# Patient Record
Sex: MALE | Race: OTHER | HISPANIC OR LATINO | Employment: UNEMPLOYED | URBAN - METROPOLITAN AREA
[De-identification: names, ages, dates, MRNs, and addresses within clinical notes are randomized per-mention and may not be internally consistent; named-entity substitution may affect disease eponyms.]

---

## 2017-09-10 ENCOUNTER — HOSPITAL ENCOUNTER (EMERGENCY)
Facility: HOSPITAL | Age: 47
Discharge: HOME | End: 2017-09-10
Attending: EMERGENCY MEDICINE | Admitting: EMERGENCY MEDICINE
Payer: COMMERCIAL

## 2017-09-10 VITALS
BODY MASS INDEX: 24.17 KG/M2 | SYSTOLIC BLOOD PRESSURE: 131 MMHG | OXYGEN SATURATION: 99 % | HEIGHT: 67 IN | RESPIRATION RATE: 18 BRPM | TEMPERATURE: 97.8 F | HEART RATE: 75 BPM | WEIGHT: 154 LBS | DIASTOLIC BLOOD PRESSURE: 78 MMHG

## 2017-09-10 DIAGNOSIS — F41.9 ANXIETY: Primary | ICD-10-CM

## 2017-09-10 DIAGNOSIS — F19.239 DRUG WITHDRAWAL (HCC): ICD-10-CM

## 2017-09-10 PROCEDURE — 99284 EMERGENCY DEPT VISIT MOD MDM: CPT

## 2017-09-10 RX ORDER — LORAZEPAM 0.5 MG/1
1 TABLET ORAL ONCE
Status: COMPLETED | OUTPATIENT
Start: 2017-09-10 | End: 2017-09-10

## 2017-09-10 RX ORDER — NALTREXONE HYDROCHLORIDE 50 MG/1
50 TABLET, FILM COATED ORAL DAILY
Status: COMPLETED | OUTPATIENT
Start: 2017-09-10 | End: 2017-09-10

## 2017-09-10 RX ORDER — NALTREXONE HYDROCHLORIDE 50 MG/1
50 TABLET, FILM COATED ORAL DAILY
Qty: 14 TABLET | Refills: 0 | Status: SHIPPED | OUTPATIENT
Start: 2017-09-10 | End: 2017-09-24

## 2017-09-10 RX ORDER — LORAZEPAM 0.5 MG/1
0.5 TABLET ORAL ONCE
Status: DISCONTINUED | OUTPATIENT
Start: 2017-09-10 | End: 2017-09-10

## 2017-09-10 RX ADMIN — LORAZEPAM 1 MG: 0.5 TABLET ORAL at 21:38

## 2017-09-10 RX ADMIN — NALTREXONE HYDROCHLORIDE 50 MG: 50 TABLET, FILM COATED ORAL at 22:00

## 2017-09-11 ENCOUNTER — HOSPITAL ENCOUNTER (EMERGENCY)
Facility: HOSPITAL | Age: 47
End: 2017-09-12
Attending: EMERGENCY MEDICINE | Admitting: EMERGENCY MEDICINE
Payer: COMMERCIAL

## 2017-09-11 VITALS
DIASTOLIC BLOOD PRESSURE: 66 MMHG | RESPIRATION RATE: 18 BRPM | TEMPERATURE: 97.1 F | SYSTOLIC BLOOD PRESSURE: 137 MMHG | HEART RATE: 74 BPM | WEIGHT: 154 LBS | OXYGEN SATURATION: 100 % | BODY MASS INDEX: 24.12 KG/M2

## 2017-09-11 DIAGNOSIS — R45.850 HOMICIDAL IDEATION: Primary | ICD-10-CM

## 2017-09-11 DIAGNOSIS — R45.851 SUICIDAL IDEATION: ICD-10-CM

## 2017-09-11 LAB
AMPHETAMINES SERPL QL SCN: NEGATIVE
BARBITURATES UR QL: NEGATIVE
BENZODIAZ UR QL: NEGATIVE
COCAINE UR QL: NEGATIVE
METHADONE UR QL: NEGATIVE
OPIATES UR QL SCN: NEGATIVE
PCP UR QL: NEGATIVE
THC UR QL: NEGATIVE

## 2017-09-11 PROCEDURE — 82075 ASSAY OF BREATH ETHANOL: CPT | Performed by: EMERGENCY MEDICINE

## 2017-09-11 PROCEDURE — 80307 DRUG TEST PRSMV CHEM ANLYZR: CPT | Performed by: EMERGENCY MEDICINE

## 2017-09-12 PROCEDURE — 99285 EMERGENCY DEPT VISIT HI MDM: CPT

## 2018-08-11 ENCOUNTER — HOSPITAL ENCOUNTER (EMERGENCY)
Facility: HOSPITAL | Age: 48
End: 2018-08-11
Attending: EMERGENCY MEDICINE | Admitting: EMERGENCY MEDICINE
Payer: COMMERCIAL

## 2018-08-11 VITALS
HEART RATE: 85 BPM | OXYGEN SATURATION: 100 % | BODY MASS INDEX: 23.18 KG/M2 | TEMPERATURE: 97.1 F | SYSTOLIC BLOOD PRESSURE: 164 MMHG | RESPIRATION RATE: 16 BRPM | WEIGHT: 148 LBS | DIASTOLIC BLOOD PRESSURE: 101 MMHG

## 2018-08-11 DIAGNOSIS — R45.851 SUICIDAL IDEATION: Primary | ICD-10-CM

## 2018-08-11 LAB
AMPHETAMINES SERPL QL SCN: NEGATIVE
BARBITURATES UR QL: NEGATIVE
BENZODIAZ UR QL: NEGATIVE
COCAINE UR QL: POSITIVE
ETHANOL EXG-MCNC: 0 MG/DL
METHADONE UR QL: NEGATIVE
OPIATES UR QL SCN: POSITIVE
PCP UR QL: NEGATIVE
THC UR QL: POSITIVE

## 2018-08-11 PROCEDURE — 99285 EMERGENCY DEPT VISIT HI MDM: CPT

## 2018-08-11 PROCEDURE — 80307 DRUG TEST PRSMV CHEM ANLYZR: CPT | Performed by: EMERGENCY MEDICINE

## 2018-08-11 PROCEDURE — 82075 ASSAY OF BREATH ETHANOL: CPT | Performed by: EMERGENCY MEDICINE

## 2018-08-11 RX ORDER — NICOTINE 21 MG/24HR
14 PATCH, TRANSDERMAL 24 HOURS TRANSDERMAL ONCE
Status: DISCONTINUED | OUTPATIENT
Start: 2018-08-11 | End: 2018-08-11 | Stop reason: HOSPADM

## 2018-08-11 RX ORDER — IBUPROFEN 400 MG/1
400 TABLET ORAL ONCE
Status: COMPLETED | OUTPATIENT
Start: 2018-08-11 | End: 2018-08-11

## 2018-08-11 RX ADMIN — NICOTINE 14 MG: 14 PATCH, EXTENDED RELEASE TRANSDERMAL at 04:35

## 2018-08-11 RX ADMIN — IBUPROFEN 400 MG: 400 TABLET ORAL at 04:35

## 2018-08-11 NOTE — ED NOTES
Insurance Authorization:   Phone call placed to [facility]  Phone number: [phone]  Spoke to Prince Hardy  3 days approved    Level of care: Inpatient Mental Health  Review on 8/13/18  Authorization # call upon arrival

## 2018-08-11 NOTE — EMTALA/ACUTE CARE TRANSFER
[address]   [place] [address]  Dept: [facility]ie P[facility] Perla Vásquez   1970                              MRN [id number]    I have been informed of my rights regarding examination, treatment, and transfer   by Dr Tino Byers MD    Benefits: Other benefits (Include comment)_______________________ (stabilization of mental health)    Risks: Other: (Include comment)__________________________ (decompensation)      Transfer Request   I acknowledge that my medical condition has been evaluated and explained to me by the emergency department physician or other qualified medical person and/or my attending physician who has recommended and offered to me further medical examination and treatment  I understand the Hospital's obligation with respect to the treatment and stabilization of my emergency medical condition  I nevertheless request to be transferred  I release the Hospital, the doctor, and any other persons caring for me from all responsibility or liability for any injury or ill effects that may result from my transfer and agree to accept all responsibility for the consequences of my choice to transfer, rather than receive stabilizing treatment at the Hospital  I understand that because the transfer is my request, my insurance may not provide reimbursement for the services  The Hospital will assist and direct me and my family in how to make arrangements for transfer, but the hospital is not liable for any fees charged by the transport service  In spite of this understanding, I refuse to consent to further medical examination and treatment which has been offered to me, and request transfer to  [facility] Name, [address] : [place]   I authorize the performance of emergency medical procedures and treatments upon me in both transit and upon arrival at the receiving facility  Additionally, I authorize the release of any and all medical records to the receiving facility and request they be transported with me, if possible  I authorize the performance of emergency medical procedures and treatments upon me in both transit and upon arrival at the receiving facility  Additionally, I authorize the release of any and all medical records to the receiving facility and request they be transported with me, if possible  I understand that the safest mode of transportation during a medical emergency is an ambulance and that the Hospital advocates the use of this mode of transport  Risks of traveling to the receiving facility by car, including absence of medical control, life sustaining equipment, such as oxygen, and medical personnel has been explained to me and I fully understand them  (ORIN CORRECT BOX BELOW)  [  ]  I consent to the stated transfer and to be transported by ambulance/helicopter  [  ]  I consent to the stated transfer, but refuse transportation by ambulance and accept full responsibility for my transportation by car  I understand the risks of non-ambulance transfers and I exonerate the Hospital and its staff from any deterioration in my condition that results from this refusal     X___________________________________________    DATE  18  TIME________  Signature of patient or legally responsible individual signing on patient behalf           RELATIONSHIP TO PATIENT_________________________          Provider Rasheed Garcia   1970                              MRN [id number]    A medical screening exam was performed on the above named patient  Based on the examination:    Condition Necessitating Transfer The encounter diagnosis was Suicidal ideation      Patient Condition: The patient has been stabilized such that within reasonable medical probability, no material deterioration of the patient condition or the condition of the unborn child(debbie) is likely to result from the transfer    Reason for Transfer: Level of Care needed not available at this facility    Transfer Requirements: [address] [place] [address]   · Space available and qualified personnel available for treatment as acknowledged by Viktoriya Membreno [phone]  · Agreed to accept transfer and to provide appropriate medical treatment as acknowledged by       Dr Tyler Antony  · Appropriate medical records of the examination and treatment of the patient are provided at the time of transfer   [address] [facility], [address] _______  · Transfer will be performed by qualified personnel from [facility]  and appropriate transfer equipment as required, including the use of necessary and appropriate life support measures  Provider Certification: I have examined the patient and explained the following risks and benefits of being transferred/refusing transfer to the patient/family:  General risk, such as traffic hazards, adverse weather conditions, rough terrain or turbulence, possible failure of equipment (including vehicle or aircraft), or consequences of actions of persons outside the control of the transport personnel      Based on these reasonable risks and benefits to the patient and/or the unborn child(debbie), and based upon the information available at the time of the patients examination, I certify that the medical benefits reasonably to be expected from the provision of appropriate medical treatments at another medical facility outweigh the increasing risks, if any, to the individuals medical condition, and in the case of labor to the unborn child, from effecting the transfer      X____________________________________________ DATE 08/11/18        TIME_______      ORIGINAL - SEND TO MEDICAL RECORDS   COPY - SEND WITH PATIENT DURING TRANSFER

## 2018-08-11 NOTE — ED NOTES
Crisis Worker (CW) called and spoke to Kane at [facility] and she stated to fax over the clinical       CW faxed clinical to [facility]

## 2018-08-11 NOTE — ED NOTES
CW received phone call from [facility] who asked additional questions regarding patient  She stated that patient is accepted by Dr Susan Goldsmith  CW set up transport with [facility] for 1900  CW called and let Bertha at [facility] know of ETA  CW had Pt ad Dr Shanelle Lopez EMTALA

## 2018-08-11 NOTE — ED NOTES
Call placed to [facility]  No clinical manager was available  Waiting return call

## 2018-08-11 NOTE — LETTER
[address]   [place] [address]  Dept: [facility] Tena Anderson   1970                              MRN [id number]    I have been informed of my rights regarding examination, treatment, and transfer   by Dr Andreia Frederick MD    Benefits: Other benefits (Include comment)_______________________ (stabilization of mental health)    Risks: Other: (Include comment)__________________________ (decompensation)      Transfer Request   I acknowledge that my medical condition has been evaluated and explained to me by the emergency department physician or other qualified medical person and/or my attending physician who has recommended and offered to me further medical examination and treatment  I understand the Hospital's obligation with respect to the treatment and stabilization of my emergency medical condition  I nevertheless request to be transferred  I release the Hospital, the doctor, and any other persons caring for me from all responsibility or liability for any injury or ill effects that may result from my transfer and agree to accept all responsibility for the consequences of my choice to transfer, rather than receive stabilizing treatment at the Hospital  I understand that because the transfer is my request, my insurance may not provide reimbursement for the services  The Hospital will assist and direct me and my family in how to make arrangements for transfer, but the hospital is not liable for any fees charged by the transport service  In spite of this understanding, I refuse to consent to further medical examination and treatment which has been offered to me, and request transfer to  [facility] Name, [address] : [place], [place]   I authorize the performance of emergency medical procedures and treatments upon me in both transit and upon arrival at the receiving facility  Additionally, I authorize the release of any and all medical records to the receiving facility and request they be transported with me, if possible  I authorize the performance of emergency medical procedures and treatments upon me in both transit and upon arrival at the receiving facility  Additionally, I authorize the release of any and all medical records to the receiving facility and request they be transported with me, if possible  I understand that the safest mode of transportation during a medical emergency is an ambulance and that the Hospital advocates the use of this mode of transport  Risks of traveling to the receiving facility by car, including absence of medical control, life sustaining equipment, such as oxygen, and medical personnel has been explained to me and I fully understand them  (ORIN CORRECT BOX BELOW)  [ X ]  I consent to the stated transfer and to be transported by ambulance/helicopter  [  ]  I consent to the stated transfer, but refuse transportation by ambulance and accept full responsibility for my transportation by car  I understand the risks of non-ambulance transfers and I exonerate the Hospital and its staff from any deterioration in my condition that results from this refusal     X___________________________________________    DATE  18  TIME________  Signature of patient or legally responsible individual signing on patient behalf           RELATIONSHIP TO PATIENT________self_________________          Provider [facility] 1970                              MRN [id number]    A medical screening exam was performed on the above named patient  Based on the examination:    Condition Necessitating Transfer There were no encounter diagnoses      Patient Condition: The patient has been stabilized such that within reasonable medical probability, no material deterioration of the patient condition or the condition of the unborn child(debbie) is likely to result from the transfer    Reason for Transfer: Level of Care needed not available at this facility    Transfer Requirements: [address], [place], [place]   · Space available and qualified personnel available for treatment as acknowledged by Jeaneth Sykes [phone]  · Agreed to accept transfer and to provide appropriate medical treatment as acknowledged by       Dr Daisy Rios  · Appropriate medical records of the examination and treatment of the patient are provided at the time of transfer   [address] [facility], [address] _______  · [facility]  and appropriate transfer equipment as required, including the use of necessary and appropriate life support measures  Provider Certification: I have examined the patient and explained the following risks and benefits of being transferred/refusing transfer to the patient/family:  General risk, such as traffic hazards, adverse weather conditions, rough terrain or turbulence, possible failure of equipment (including vehicle or aircraft), or consequences of actions of persons outside the control of the transport personnel      Based on these reasonable risks and benefits to the patient and/or the unborn child(debbie), and based upon the information available at the time of the patients examination, I certify that the medical benefits reasonably to be expected from the provision of appropriate medical treatments at another medical facility outweigh the increasing risks, if any, to the individuals medical condition, and in the case of labor to the unborn child, from effecting the transfer      X____________________________________________ DATE 08/11/18        TIME_______      ORIGINAL - SEND TO MEDICAL RECORDS   COPY - SEND WITH PATIENT DURING TRANSFER

## 2018-08-11 NOTE — ED PROVIDER NOTES
History  Chief Complaint   Patient presents with    Psychiatric Evaluation     pt reports SI today related to recent breakup with fiance  denies HI, VH, AH  plans to overdose on heroin     40 yo M presents to ED for evaluation of suicidal ideation and s/p suicide attempt (pt states he used 8 bags of heroin and 3 bags of cocaine)  Pt states he has been depressed recently with family stressors decided to overdose in an attempt to kill himself around midnight tonight  Pt has a hx of depression, anxiety, substance abuse and bipolar disorder, no significant PSH  Pt denies any HI, AVH  Pt states chronic low back pain, no hx of recent trauma/injury, verbalizes no additional medical complaints  Pt is a current 1/2 ppd smoker, denies any ETOH use or other recreational drug use  No other complaints at this time  Prior to Admission Medications   Prescriptions Last Dose Informant Patient Reported? Taking? FLUoxetine (PROzac) 20 mg capsule   No No   Sig: Take 1 capsule (20 mg total) by mouth daily Indications: Manic-Depression  Naltrexone (VIVITROL IM)   Yes No   Sig: Inject 1 Dose into the shoulder, thigh, or buttocks every 30 (thirty) days Due 9/11   QUEtiapine (SEROquel) 200 mg tablet   No No   Sig: Take 1 tablet (200 mg total) by mouth daily at bedtime Indications: Manic Phase of Manic-Depression     Patient taking differently: Take 300 mg by mouth 2 (two) times a day     naltrexone (REVIA) 50 mg tablet   No No   Sig: Take 1 tablet by mouth daily for 14 days      Facility-Administered Medications: None       Past Medical History:   Diagnosis Date    Anxiety     Bipolar I disorder, most recent episode depressed, severe with psychotic features ([facility] ) 2/23/2016    Depression     Foot infection     Hepatitis C virus infection 02/23/2016    has had treatment    Psychiatric illness     Self-injurious behavior     Substance abuse     Suicide attempt ([facility] )     Withdrawal symptoms, drug or narcotic ([facility] ) History reviewed  No pertinent surgical history  Family History   Problem Relation Age of Onset    No Known Problems Mother     No Known Problems Father     No Known Problems Sister     No Known Problems Brother     No Known Problems Maternal Aunt     No Known Problems Paternal Aunt     No Known Problems Maternal Uncle     No Known Problems Paternal Uncle     No Known Problems Maternal Grandfather     No Known Problems Maternal Grandmother     No Known Problems Paternal Grandfather     No Known Problems Paternal Grandmother     No Known Problems Cousin     ADD / ADHD Neg Hx     Alcohol abuse Neg Hx     Anxiety disorder Neg Hx     Bipolar disorder Neg Hx     Dementia Neg Hx     Depression Neg Hx     Drug abuse Neg Hx     OCD Neg Hx     Paranoid behavior Neg Hx     Schizophrenia Neg Hx     Seizures Neg Hx     Self-Injury Neg Hx     Suicide Attempts Neg Hx      I have reviewed and agree with the history as documented  Social History   Substance Use Topics    Smoking status: Current Every Day Smoker     Packs/day: 0 50     Years: 15 00    Smokeless tobacco: Never Used    Alcohol use No        Review of Systems   Constitutional: Negative for chills, fatigue and fever  HENT: Negative for congestion, rhinorrhea and sore throat  Eyes: Negative for pain, redness and visual disturbance  Respiratory: Negative for cough, chest tightness, shortness of breath, wheezing and stridor  Cardiovascular: Negative for chest pain, palpitations and leg swelling  Gastrointestinal: Negative for abdominal pain, blood in stool, constipation, diarrhea, nausea and vomiting  Genitourinary: Negative for dysuria, frequency, hematuria and urgency  Musculoskeletal: Positive for back pain (chronic low back pain)  Negative for neck pain  Skin: Negative for pallor and rash  Neurological: Negative for dizziness, seizures, syncope, weakness, light-headedness and headaches  Psychiatric/Behavioral: Positive for dysphoric mood, self-injury and suicidal ideas  Negative for agitation, confusion and hallucinations  The patient is not nervous/anxious  Physical Exam  ED Triage Vitals   Temperature Pulse Respirations Blood Pressure SpO2   08/11/18 0202 08/11/18 0202 08/11/18 0202 08/11/18 0202 08/11/18 0202   (!) 97 1 °F (36 2 °C) 101 16 142/94 98 %      Temp Source Heart Rate Source Patient Position - Orthostatic VS BP Location FiO2 (%)   08/11/18 0202 08/11/18 1249 08/11/18 1249 08/11/18 1249 --   Tympanic Monitor Sitting Right arm       Pain Score       08/11/18 0202       8           Orthostatic Vital Signs  Vitals:    08/11/18 0202 08/11/18 1249 08/11/18 1914   BP: 142/94 132/69 (!) 164/101   Pulse: 101 72 85   Patient Position - Orthostatic VS:  Sitting        Physical Exam   Constitutional: He is oriented to person, place, and time  He appears well-developed and well-nourished  No distress  HENT:   Head: Normocephalic and atraumatic  Nose: Nose normal    Mouth/Throat: Oropharynx is clear and moist  No oropharyngeal exudate  Eyes: Conjunctivae and EOM are normal  Pupils are equal, round, and reactive to light  Right eye exhibits no discharge  Left eye exhibits no discharge  Neck: Normal range of motion  Neck supple  No JVD present  No tracheal deviation present  Cardiovascular: Normal rate, regular rhythm, normal heart sounds and intact distal pulses  Exam reveals no gallop and no friction rub  No murmur heard  Pulmonary/Chest: Effort normal and breath sounds normal  No stridor  No respiratory distress  He has no wheezes  He has no rales  He exhibits no tenderness  Abdominal: Soft  Bowel sounds are normal  He exhibits no distension  There is no tenderness  There is no rebound and no guarding  Genitourinary:   Genitourinary Comments: No flank/CVA tenderness   Musculoskeletal: Normal range of motion   He exhibits tenderness (R SI joint tenderness, no midline spinal tenderness/no step-off)  He exhibits no edema or deformity  Neurological: He is alert and oriented to person, place, and time  No cranial nerve deficit  Skin: Skin is warm and dry  No rash noted  He is not diaphoretic  Psychiatric: He has a normal mood and affect  Nursing note and vitals reviewed  ED Medications  Medications   ibuprofen (MOTRIN) tablet 400 mg (400 mg Oral Given 8/11/18 0435)       Diagnostic Studies  Results Reviewed     Procedure Component Value Units Date/Time    Rapid drug screen, urine [[id number]]  (Abnormal) Collected:  08/11/18 0704    Lab Status:  Final result Specimen:  Urine from Urine, Other Updated:  08/11/18 0735     Amph/Meth UR Negative     Barbiturate Ur Negative     Benzodiazepine Urine Negative     Cocaine Urine Positive (A)     Methadone Urine Negative     Opiate Urine Positive (A)     PCP Ur Negative     THC Urine Positive (A)    Narrative:         Presumptive report  If requested, specimen will be sent to reference lab for confirmation  FOR MEDICAL PURPOSES ONLY  IF CONFIRMATION NEEDED PLEASE CONTACT THE LAB WITHIN 5 DAYS  Drug Screen Cutoff Levels:  AMPHETAMINE/METHAMPHETAMINES  1000 ng/mL  BARBITURATES     200 ng/mL  BENZODIAZEPINES     200 ng/mL  COCAINE      300 ng/mL  METHADONE      300 ng/mL  OPIATES      300 ng/mL  PHENCYCLIDINE     25 ng/mL  THC       50 ng/mL    POCT alcohol breath test [[id number]]  (Normal) Resulted:  08/11/18 0339    Lab Status:  Final result Updated:  08/11/18 0339     EXTBreath Alcohol 0 000                 No orders to display         Procedures  Procedures      Phone Consults  ED Phone Contact    ED Course  ED Course as of Aug 11 2124   Sat Aug 11, 2018   0502 Crisis evaluated patient, 12 signed, bed search in progress               Patient reevaluated with improvement in condition noted   Patient updated on results of tests and plan of care including transfer to [address] [place] [address] for further evaluation of presenting symptoms with understanding verbalized  Transportation services arranged and necessary EMTALA forms completed with risks and benefits of transfer understood  Report to Dr Rylan Day with Psychiatry for continuation of patient care  [facility]  CritCare Time    Disposition  Final diagnoses:   Suicidal ideation     Time reflects when diagnosis was documented in both MDM as applicable and the Disposition within this note     Time User Action Codes Description Comment    8/11/2018  4:53 PM Lianne Shaver [M53 3] SI (sacroiliac) joint dysfunction     8/11/2018  4:53 PM Sergio Shaver [M53 3] SI (sacroiliac) joint dysfunction     8/11/2018  4:53 PM Lianne Shaver Add [N41 624] Suicidal ideation       ED Disposition     ED Disposition Condition Comment    Transfer to Another [address] [place] [address]  should be transferred out to [address] [place] [address] under the care of Dr Rylan Day MD Documentation      Most Recent Value   Patient Condition  The patient has been stabilized such that within reasonable medical probability, no material deterioration of the patient condition or the condition of the unborn child(debbie) is likely to result from the transfer   Reason for Transfer  Level of Care needed not available at this facility   Benefits of Transfer  Other benefits (Include comment)_______________________ [facility] Scrape of mental health]   Risks of Transfer  Other: (Include comment)__________________________ [decompensation]   Accepting Physician  Dr Penny Argueta Name, [place], [place]    (Name & Tel number)  Franky Poe [phone]   Transported by (Company and Unit #)  [employer]   Provider Certification  General risk, such as traffic hazards, adverse weather conditions, rough terrain or turbulence, possible failure of equipment (including vehicle or aircraft), or consequences of actions of persons outside the control of the transport personnel      RN [address] Name, [address] [place] [address]    (Name & Tel number)  Geoff Saunders [phone]   [facility]      Follow-up Information    None         Discharge Medication List as of 8/11/2018  7:17 PM      CONTINUE these medications which have NOT CHANGED    Details   FLUoxetine (PROzac) 20 mg capsule Take 1 capsule (20 mg total) by mouth daily Indications: Manic-Depression  , Starting 3/21/2016, Until Discontinued, Print      naltrexone (REVIA) 50 mg tablet Take 1 tablet by mouth daily for 14 days, Starting Sun 9/10/2017, Until Sun 9/24/2017, Print      Naltrexone (VIVITROL IM) Inject 1 Dose into the shoulder, thigh, or buttocks every 30 (thirty) days Due 9/11, Historical Med      QUEtiapine (SEROquel) 200 mg tablet Take 1 tablet (200 mg total) by mouth daily at bedtime Indications: Manic Phase of Manic-Depression  , Starting 3/21/2016, Until Discontinued, Print           No discharge procedures on file  ED Provider  Attending physically available and evaluated [facility]    I managed the patient along with the ED Attending      Electronically Signed by         Boby Ritter DO  08/11/18 7492

## 2018-08-11 NOTE — ED NOTES
Crisis worker placed calls to the following facilities; Currently there are no appropriate beds within [address], [place], [facility], [place], [place], [facility], [facility], [facility], [facility], or [facility]  Bed search will resume next shift

## 2018-08-11 NOTE — ED ATTENDING ATTESTATION
Laura Vuong MD, saw and evaluated the patient  All available labs and X-rays were ordered by me or the resident and have been reviewed by myself  I discussed the patient with the resident / non-physician and agree with the resident's / non-physician practitioner's findings and plan as documented in the resident's / non-physician practicitioner's note, except where noted  At this point, I agree with the current assessment done in the ED  Chief Complaint   Patient presents with    Psychiatric Evaluation     pt reports SI today related to recent breakup with fiance  denies HI, VH, AH  plans to overdose on heroin     47M:  - depressed b/c of recent break up with fiance  - he is suicidal with attempted overdose at 12am  He had 8 bags heroin and 3 bags of cocaine    - he would like to sign himself in  - denies HI/AH/VH  - Has had multiple admissions for exact same in past    - family dropped him off to the waiting room   PMH:  - anxiety/depression, substance abuse, suicide attempts, bipolar  - Hepatitis C  PSH:  - none  Smokes daily  No alcohol  +cocaine/heroin  PE:  Vitals:    08/11/18 0202 08/11/18 1249 08/11/18 1914   BP: 142/94 132/69 (!) 164/101   BP Location:  Right arm    Pulse: 101 72 85   Resp: 16 16 16   Temp: (!) 97 1 °F (36 2 °C)     TempSrc: Tympanic     SpO2: 98% 100% 100%   Weight: 67 1 kg (148 lb)     General: VSS, NAD, awake, alert  Well-nourished, well-developed  Appears stated age  Speaking normally in full sentences  Head: Normocephalic, atraumatic, nontender  Eyes: PERRL, EOM-I  No diplopia  No hyphema  No subconjunctival hemorrhages  Symmetrical lids  ENT: Atraumatic external nose and ears  MMM  No malocclusion  No stridor  Normal phonation  No drooling  Normal swallowing  Neck: Symmetric, trachea midline  No JVD  CV: RRR  +S1/S2  No cardiac murmurs  No murmurs or gallops  Peripheral pulses +2 throughout  No chest wall tenderness     Lungs:   Unlabored No retractions  CTAB, lungs sounds equal bilateral    No tachypnea  Abd: +BS, soft, NT/ND    MSK:   FROM   Back:   No rashes  Skin: Dry, intact  Neuro: AAOx3, GCS 15, CN II-XII grossly intact  Motor grossly intact  Psychiatric/Behavioral: Appropriate mood and affect  Appears flat affect but eating food in no distress   Exam: deferred  A:  - SI with attempted OD  P:  - 201  - I doubt he ingested cocaine and heroin given how well he appears in the room or at least not in the quantities described  Still will monitor closely  Clinically sober at this time  - 13 point ROS was performed and all are normal unless stated in the history above  - Nursing note reviewed  Vitals reviewed  - Orders placed by myself and/or advanced practitioner / resident     - Previous chart was reviewed  - No language barrier    - History obtained from patient  - There are no limitations to the history obtained  - Critical care time: Not applicable for this patient  Final Diagnosis:  1  Suicidal ideation         Medications   ibuprofen (MOTRIN) tablet 400 mg (400 mg Oral Given 8/11/18 8025)     No orders to display     Orders Placed This Encounter   Procedures    Rapid drug screen, urine    POCT alcohol breath test     Labs Reviewed   RAPID DRUG SCREEN, URINE - Abnormal        Result Value Ref Range Status    Amph/Meth UR Negative  Negative Final    Barbiturate Ur Negative  Negative Final    Benzodiazepine Urine Negative  Negative Final    Cocaine Urine Positive (*) Negative Final    Methadone Urine Negative  Negative Final    Opiate Urine Positive (*) Negative Final    PCP Ur Negative  Negative Final    THC Urine Positive (*) Negative Final    Narrative:     Presumptive report  If requested, specimen will be sent to reference lab for confirmation  FOR MEDICAL PURPOSES ONLY  IF CONFIRMATION NEEDED PLEASE CONTACT THE LAB WITHIN 5 DAYS      Drug Screen Cutoff Levels:  AMPHETAMINE/METHAMPHETAMINES  1000 ng/mL  BARBITURATES     200 ng/mL  BENZODIAZEPINES     200 ng/mL  COCAINE      300 ng/mL  METHADONE      300 ng/mL  OPIATES      300 ng/mL  PHENCYCLIDINE     25 ng/mL  THC       50 ng/mL   POCT ALCOHOL BREATH TEST - Normal    EXTBreath Alcohol 0 000   Final     Time reflects when diagnosis was documented in both MDM as applicable and the Disposition within this note     Time User Action Codes Description Comment    8/11/2018  4:53 PM Nithin Shaver Add [M53 3] SI (sacroiliac) joint dysfunction     8/11/2018  4:53 PM Amita Shaver Clause [M53 3] SI (sacroiliac) joint dysfunction     8/11/2018  4:53 PM Nithin Shaver Add [A87 038] Suicidal ideation       ED Disposition     ED Disposition Condition Comment    Transfer to Another [address] [place] [address]  should be transferred out to [address] [place] [address] under the care of Dr Kristina Ro MD Documentation      Most Recent Value   Patient Condition  The patient has been stabilized such that within reasonable medical probability, no material deterioration of the patient condition or the condition of the unborn child(debbie) is likely to result from the transfer   Reason for Transfer  Level of Care needed not available at this facility   Benefits of Transfer  Other benefits (Include comment)_______________________ [facility] Caller of mental health]   Risks of Transfer  Other: (Include comment)__________________________ [decompensation]   Accepting Physician  Dr Buddy Boyd Name, [place], [place]    (Name & Tel number)  Nancy Gomez [phone]   Transported by (Company and Unit #)  [id number]   Sending MD Dr Vimal Gallardo   Provider Certification  General risk, such as traffic hazards, adverse weather conditions, rough terrain or turbulence, possible failure of equipment (including vehicle or aircraft), or consequences of actions of persons outside the control of the transport personnel      RN Documentation Most Recent Value   Accepting Facility Name, [address] [place] [address]    (Name & Tel number)  Shea Cerda [phone]   Transported by [employer] and Unit #)  [facility]      Follow-up Information    None       Discharge Medication List as of 8/11/2018  7:17 PM      CONTINUE these medications which have NOT CHANGED    Details   FLUoxetine (PROzac) 20 mg capsule Take 1 capsule (20 mg total) by mouth daily Indications: Manic-Depression  , Starting 3/21/2016, Until Discontinued, Print      naltrexone (REVIA) 50 mg tablet Take 1 tablet by mouth daily for 14 days, Starting Sun 9/10/2017, Until Sun 9/24/2017, Print      Naltrexone (VIVITROL IM) Inject 1 Dose into the shoulder, thigh, or buttocks every 30 (thirty) days Due 9/11, Historical Med      QUEtiapine (SEROquel) 200 mg tablet Take 1 tablet (200 mg total) by mouth daily at bedtime Indications: Manic Phase of Manic-Depression  , Starting 3/21/2016, Until Discontinued, Print           No discharge procedures on file  Prior to Admission Medications   Prescriptions Last Dose Informant Patient Reported? Taking? FLUoxetine (PROzac) 20 mg capsule   No No   Sig: Take 1 capsule (20 mg total) by mouth daily Indications: Manic-Depression  Naltrexone (VIVITROL IM)   Yes No   Sig: Inject 1 Dose into the shoulder, thigh, or buttocks every 30 (thirty) days Due 9/11   QUEtiapine (SEROquel) 200 mg tablet   No No   Sig: Take 1 tablet (200 mg total) by mouth daily at bedtime Indications: Manic Phase of Manic-Depression  Patient taking differently: Take 300 mg by mouth 2 (two) times a day     naltrexone (REVIA) 50 mg tablet   No No   Sig: Take 1 tablet by mouth daily for 14 days      Facility-Administered Medications: None       Portions of the record may have been created with voice recognition software  Occasional wrong word or "sound a like" substitutions may have occurred due to the inherent limitations of voice recognition software   Read the chart carefully and recognize, using context, where substitutions have occurred      Electronically signed by:  Gene Best

## 2018-08-11 NOTE — ED NOTES
CW called and spoke to Kane at [facility]  She asked if Pt is currently on Vivitrol  CW asked Pt and Pt stated that he has not been on that for at least 8 months  CW told Bertha at [facility] that Pt is no longer on that medication and has not taken any medications in lat few months  She asked if CW can make a written note in chart and she will tell clinician who is reviewing

## 2018-08-11 NOTE — ED NOTES
Patient presented to the ED after a fight with his girlfriend whom is 1 month pregnant with his first child  Patient stated he felt lost if she would break up with him  Patient has been clean for 4 months prior to using today  Today he used 7 bags of heroin and 3 bags of cocaine with attempt to overdose  Patient stated he was mad at his family  Patient is cleared from parole and is currently working full time  Patient was prescribed Seroquel and Prozac from [facility] [id number] with a month script at discharge  Patient did not follow up with outpatient services  Patient stated he never got to it  Patient stated he was admitted to [facility] [address] for depression with an attempt by cutting his arms  Patient denied auditory or visual hallucinations at this time  Patient stated he experiences racing thoughts and struggling to sleep more than a few hours a night  Patient is willing to sign 201  Doctor in agreement with inpatient mental health treatment

## 2018-08-11 NOTE — ED NOTES
CW called and spoke to Billie Samayoa at [facility] who stated that she needed written note stating patient is off vivitrol  CW then refaxed chart ti [facility] with note stating CW talked with patient who stated that he is off vivitrol

## 2018-08-25 ENCOUNTER — HOSPITAL ENCOUNTER (EMERGENCY)
Facility: HOSPITAL | Age: 48
End: 2018-08-25
Attending: EMERGENCY MEDICINE
Payer: COMMERCIAL

## 2018-08-25 VITALS
HEART RATE: 85 BPM | BODY MASS INDEX: 25.06 KG/M2 | TEMPERATURE: 97.7 F | DIASTOLIC BLOOD PRESSURE: 88 MMHG | SYSTOLIC BLOOD PRESSURE: 149 MMHG | OXYGEN SATURATION: 100 % | WEIGHT: 160 LBS | RESPIRATION RATE: 16 BRPM

## 2018-08-25 DIAGNOSIS — F32.A DEPRESSION: ICD-10-CM

## 2018-08-25 DIAGNOSIS — R44.0 AUDITORY HALLUCINATION: ICD-10-CM

## 2018-08-25 DIAGNOSIS — R45.851 SUICIDAL IDEATION: Primary | ICD-10-CM

## 2018-08-25 LAB
AMPHETAMINES SERPL QL SCN: NEGATIVE
BARBITURATES UR QL: NEGATIVE
BENZODIAZ UR QL: NEGATIVE
COCAINE UR QL: NEGATIVE
ETHANOL EXG-MCNC: 0 MG/DL
METHADONE UR QL: NEGATIVE
OPIATES UR QL SCN: NEGATIVE
PCP UR QL: NEGATIVE
THC UR QL: NEGATIVE

## 2018-08-25 PROCEDURE — 82075 ASSAY OF BREATH ETHANOL: CPT | Performed by: EMERGENCY MEDICINE

## 2018-08-25 PROCEDURE — 99285 EMERGENCY DEPT VISIT HI MDM: CPT

## 2018-08-25 PROCEDURE — 80307 DRUG TEST PRSMV CHEM ANLYZR: CPT | Performed by: EMERGENCY MEDICINE

## 2018-08-25 NOTE — ED PROVIDER NOTES
History  Chief Complaint   Patient presents with    Psychiatric Evaluation     Pt  states that he has been having suicidal thoughts since yesturday  Pt  states that he is hearing voices  Pt  states that he has been prescibed medications but he is not taking them and that he didn't pick them up  Pt  states his plan is to overdose on "benzos"  Pt  states he has access to this medication  HPI    42-year-old male with a past medical history of anxiety, bipolar, depression, hepatitis-C, substance abuse presents for evaluation of suicidal thoughts  Patient says he is having suicidal ideations for a while now particularly last few days, day is currently admitted in [address] [place] [address]  Patient says he has a plan to overdose on benzos  He says he has been having auditory hallucinations to kill himself and to kill others  Says he has been at a [address] [place] [address] rehab program last 2 weeks but has not gotten help for his suicidal ideations  He denies taking his medications  He denies recreational drug or ethanol use since 8/11/18  He admits to sleep disturbance and poor appetite  Denies fever, chills, chest pain, shortness of breath, nausea, or vomiting  Prior to Admission Medications   Prescriptions Last Dose Informant Patient Reported? Taking? FLUoxetine (PROzac) 20 mg capsule   No No   Sig: Take 1 capsule (20 mg total) by mouth daily Indications: Manic-Depression  Naltrexone (VIVITROL IM)   Yes No   Sig: Inject 1 Dose into the shoulder, thigh, or buttocks every 30 (thirty) days Due 9/11   QUEtiapine (SEROquel) 200 mg tablet   No No   Sig: Take 1 tablet (200 mg total) by mouth daily at bedtime Indications: Manic Phase of Manic-Depression     Patient taking differently: Take 300 mg by mouth 2 (two) times a day     naltrexone (REVIA) 50 mg tablet   No No   Sig: Take 1 tablet by mouth daily for 14 days      Facility-Administered Medications: None       Past Medical History:   Diagnosis Date    Anxiety     Bipolar I disorder, most recent episode depressed, severe with psychotic features ([place] [address] ) 2/23/2016    Depression     Foot infection     Hepatitis C virus infection 02/23/2016    has had treatment    Psychiatric illness     Self-injurious behavior     Substance abuse     Suicide attempt ([place] [address] )     Withdrawal symptoms, drug or narcotic ([place] [address] )        History reviewed  No pertinent surgical history  Family History   Problem Relation Age of Onset    No Known Problems Mother     No Known Problems Father     No Known Problems Sister     No Known Problems Brother     No Known Problems Maternal Aunt     No Known Problems Paternal Aunt     No Known Problems Maternal Uncle     No Known Problems Paternal Uncle     No Known Problems Maternal Grandfather     No Known Problems Maternal Grandmother     No Known Problems Paternal Grandfather     No Known Problems Paternal Grandmother     No Known Problems Cousin     ADD / ADHD Neg Hx     Alcohol abuse Neg Hx     Anxiety disorder Neg Hx     Bipolar disorder Neg Hx     Dementia Neg Hx     Depression Neg Hx     Drug abuse Neg Hx     OCD Neg Hx     Paranoid behavior Neg Hx     Schizophrenia Neg Hx     Seizures Neg Hx     Self-Injury Neg Hx     Suicide Attempts Neg Hx      I have reviewed and agree with the history as documented  Social History   Substance Use Topics    Smoking status: Current Every Day Smoker     Packs/day: 0 50     Years: 15 00    Smokeless tobacco: Never Used    Alcohol use No        Review of Systems   Constitutional: Negative for chills, diaphoresis, fatigue and fever  HENT: Negative for congestion, rhinorrhea and sore throat  Eyes: Negative for photophobia and visual disturbance  Respiratory: Negative for cough, chest tightness and shortness of breath  Cardiovascular: Negative for chest pain and palpitations  Gastrointestinal: Negative for abdominal pain, blood in stool, constipation, diarrhea, nausea and vomiting  Genitourinary: Negative for dysuria, frequency and hematuria  Musculoskeletal: Negative for back pain, gait problem, myalgias, neck pain and neck stiffness  Skin: Negative for pallor and rash  Neurological: Negative for weakness, light-headedness, numbness and headaches  Hematological: Negative for adenopathy  Does not bruise/bleed easily  Psychiatric/Behavioral: Positive for dysphoric mood, hallucinations, sleep disturbance and suicidal ideas  Negative for self-injury  All other systems reviewed and are negative  Physical Exam  ED Triage Vitals [08/25/18 0641]   Temperature Pulse Respirations Blood Pressure SpO2   97 7 °F (36 5 °C) 81 16 149/76 100 %      Temp Source Heart Rate Source Patient Position - Orthostatic VS BP Location FiO2 (%)   Oral Monitor Sitting Left arm --      Pain Score       No Pain           Orthostatic Vital Signs  Vitals:    08/25/18 0641 08/25/18 1042   BP: 149/76 149/88   Pulse: 81 85   Patient Position - Orthostatic VS: Sitting Sitting       Physical Exam   Constitutional: He is oriented to person, place, and time  No distress  Patient alert and oriented, appears non-toxic, in no acute distress    HENT:   Head: Normocephalic and atraumatic  Eyes: Conjunctivae and EOM are normal  Pupils are equal, round, and reactive to light  Neck: Normal range of motion  Neck supple  Cardiovascular: Normal rate, regular rhythm, normal heart sounds and intact distal pulses  Pulmonary/Chest: Effort normal and breath sounds normal  No respiratory distress  Abdominal: Soft  Bowel sounds are normal  He exhibits no distension  There is no tenderness  Musculoskeletal: Normal range of motion  Lymphadenopathy:     He has no cervical adenopathy  Neurological: He is alert and oriented to person, place, and time     No facial asymmetry noted, CN 2-12 intact, full ROM of upper and lower extremities, muscle strength 5/5 throughout, DTRs normal, sensation intact throughout    Skin: Skin is warm and dry  Capillary refill takes less than 2 seconds  No rash noted  He is not diaphoretic  No erythema  No pallor  Psychiatric: He is actively hallucinating  He exhibits a depressed mood  He expresses suicidal ideation  He expresses suicidal plans  Nursing note and vitals reviewed  ED Medications  Medications - No data to display    Diagnostic Studies  Results Reviewed     Procedure Component Value Units Date/Time    Rapid drug screen, urine [[id number]]  (Normal) Collected:  08/25/18 0742    Lab Status:  Final result Specimen:  Urine from Urine, Other Updated:  08/25/18 0759     Amph/Meth UR Negative     Barbiturate Ur Negative     Benzodiazepine Urine Negative     Cocaine Urine Negative     Methadone Urine Negative     Opiate Urine Negative     PCP Ur Negative     THC Urine Negative    Narrative:         FOR MEDICAL PURPOSES ONLY  IF CONFIRMATION NEEDED PLEASE CONTACT THE LAB WITHIN 5 DAYS      Drug Screen Cutoff Levels:  AMPHETAMINE/METHAMPHETAMINES  1000 ng/mL  BARBITURATES     200 ng/mL  BENZODIAZEPINES     200 ng/mL  COCAINE      300 ng/mL  METHADONE      300 ng/mL  OPIATES      300 ng/mL  PHENCYCLIDINE     25 ng/mL  THC       50 ng/mL    POCT alcohol breath test [[id number]]  (Normal) Resulted:  08/25/18 0728    Lab Status:  Final result Updated:  08/25/18 0728     EXTBreath Alcohol 0 00                 No orders to display         Procedures  Procedures      Phone Consults  ED Phone Contact    ED Course                               MDM  Number of Diagnoses or Management Options  Auditory hallucination:   Depression:   Suicidal ideation:   Diagnosis management comments: Impression:  19-year-old male presents for evaluation of suicidal thoughts  Ddx: suicidal   Plan: BAT, urine drug, crisis     CritCare Time    Disposition  Final diagnoses:   Suicidal ideation   Depression   Auditory hallucination     Time reflects when diagnosis was documented in both MDM as applicable and the Disposition within this note     Time User Action Codes Description Comment    8/25/2018  7:22 PM Henry Pearson Add [O83 283] Suicidal ideation     8/25/2018  7:22 PM Henrybryce Marcusos Add [F32 9] Depression     8/25/2018  7:22 PM Henry Pearson Add [R44 0] Auditory hallucination       ED Disposition     ED Disposition Condition Comment    Transfer to Another [address] [place] [address] Street  should be transferred out to [facility] Documentation      Most Recent Value   Patient Condition  The patient has been stabilized such that within reasonable medical probability, no material deterioration of the patient condition or the condition of the unborn child(debbie) is likely to result from the transfer   Reason for Transfer  Level of Care needed not available at this facility   Benefits of Transfer  Specialized equipment and/or services available at the receiving facility (Include comment)________________________   Accepting Physician  Dr Rehana Higginbotham Name, [address] [place] [address]    (Name & Tel number)  Jacki Pantoja [phone]   Sending MD  Dr Alf Jacobs, Dr Akbar Avelar   Provider Certification  General risk, such as traffic hazards, adverse weather conditions, rough terrain or turbulence, possible failure of equipment (including vehicle or aircraft), or consequences of actions of persons outside the control of the transport personnel      RN Documentation      [facility] Name, [address] [place] [address]    (Name & Tel number)  Jacki Pantoja [phone]      Follow-up Information    None         Discharge Medication List as of 8/25/2018  1:41 PM      CONTINUE these medications which have NOT CHANGED    Details   FLUoxetine (PROzac) 20 mg capsule Take 1 capsule (20 mg total) by mouth daily Indications: Manic-Depression  , Starting 3/21/2016, Until Discontinued, Print      naltrexone (REVIA) 50 mg tablet Take 1 tablet by mouth daily for 14 days, Starting Sun 9/10/2017, Until Sun 9/24/2017, Print      Naltrexone (VIVITROL IM) Inject 1 Dose into the shoulder, thigh, or buttocks every 30 (thirty) days Due 9/11, Historical Med      QUEtiapine (SEROquel) 200 mg tablet Take 1 tablet (200 mg total) by mouth daily at bedtime Indications: Manic Phase of Manic-Depression  , Starting 3/21/2016, Until Discontinued, Print           No discharge procedures on file  ED Provider  Attending physically available and evaluated [facility]    I managed the patient along with the ED Attending      Electronically Signed by         Gaetano Lara MD  08/25/18 9640

## 2018-08-25 NOTE — ED NOTES
Phone call to [facility]   For date of service patient is eligible for MA  Paper work faxed to [facility] for review

## 2018-08-25 NOTE — ED ATTENDING ATTESTATION
Jena Rojas MD, saw and evaluated the patient  I have discussed the patient with the resident/non-physician practitioner and agree with the resident's/non-physician practitioner's findings, Plan of Care, and MDM as documented in the resident's/non-physician practitioner's note, except where noted  All available labs and Radiology studies were reviewed  At this point I agree with the current assessment done in the Emergency Department  I have conducted an independent evaluation of this patient a history and physical is as follows: Pt presented with si an command hallucination Pt had OD 2 weeks ago pt was admitted  Pt feels he didn't recover form si  Pt is not taking meds  Pt is hearing voices  No drugs or alcohol since last admission    No medical co PE: alert heart reg lungs abd soft nontender neuro nonfocal MDM: will have crisis eval  Critical Care Time  CritCare Time    Procedures

## 2018-08-25 NOTE — ED NOTES
Patient is accepted at [facility]  Patient is accepted by Dr Jenaro Ambrocio per [place] is arranged with [address] is scheduled for 1:30 pm  Patient may go to the floor at upon arrival           Nurse report is to be called to [phone] prior to patient transfer

## 2018-08-25 NOTE — EMTALA/ACUTE CARE TRANSFER
[address]   [place] [address]  Dept: Cathie PARK Kelli Dunbar   1970                              MRN [id number]    I have been informed of my rights regarding examination, treatment, and transfer   by Dr Dionna Martinez MD    Benefits: Specialized equipment and/or services available at the receiving facility (Include comment)________________________    Risks:        Transfer Request   I acknowledge that my medical condition has been evaluated and explained to me by the emergency department physician or other qualified medical person and/or my attending physician who has recommended and offered to me further medical examination and treatment  I understand the Hospital's obligation with respect to the treatment and stabilization of my emergency medical condition  I nevertheless request to be transferred  I release the Hospital, the doctor, and any other persons caring for me from all responsibility or liability for any injury or ill effects that may result from my transfer and agree to accept all responsibility for the consequences of my choice to transfer, rather than receive stabilizing treatment at the Hospital  I understand that because the transfer is my request, my insurance may not provide reimbursement for the services  The Hospital will assist and direct me and my family in how to make arrangements for transfer, but the hospital is not liable for any fees charged by the transport service  In spite of this understanding, I refuse to consent to further medical examination and treatment which has been offered to me, and request transfer to  [facility] Name, [address] : [place], [place]  I authorize the performance of emergency medical procedures and treatments upon me in both transit and upon arrival at the receiving facility    Additionally, I authorize the release of any and all medical records to the receiving facility and request they be transported with me, if possible  I authorize the performance of emergency medical procedures and treatments upon me in both transit and upon arrival at the receiving facility  Additionally, I authorize the release of any and all medical records to the receiving facility and request they be transported with me, if possible  I understand that the safest mode of transportation during a medical emergency is an ambulance and that the Hospital advocates the use of this mode of transport  Risks of traveling to the receiving facility by car, including absence of medical control, life sustaining equipment, such as oxygen, and medical personnel has been explained to me and I fully understand them  (ORIN CORRECT BOX BELOW)  [  ]  I consent to the stated transfer and to be transported by ambulance/helicopter  [  ]  I consent to the stated transfer, but refuse transportation by ambulance and accept full responsibility for my transportation by car  I understand the risks of non-ambulance transfers and I exonerate the Hospital and its staff from any deterioration in my condition that results from this refusal     X___________________________________________    DATE  18  TIME________  Signature of patient or legally responsible individual signing on patient behalf           RELATIONSHIP TO PATIENT_________________________          Provider [address] [place] [address] 1970                              MRN [id number]    A medical screening exam was performed on the above named patient  Based on the examination:    Condition Necessitating Transfer There were no encounter diagnoses      Patient Condition: The patient has been stabilized such that within reasonable medical probability, no material deterioration of the patient condition or the condition of the unborn child(debbie) is likely to result from the transfer    Reason for Transfer: Level of Care needed not available at this facility    Transfer Requirements: [facility]   · Space available and qualified personnel available for treatment as acknowledged by Laura Hernandez [phone]  · Agreed to accept transfer and to provide appropriate medical treatment as acknowledged by       Dr Yassine Gillespie  · Appropriate medical records of the examination and treatment of the patient are provided at the time of transfer   [address] [facility], [address] _______  · Transfer will be performed by qualified personnel from    and appropriate transfer equipment as required, including the use of necessary and appropriate life support measures  Provider Certification: I have examined the patient and explained the following risks and benefits of being transferred/refusing transfer to the patient/family:  General risk, such as traffic hazards, adverse weather conditions, rough terrain or turbulence, possible failure of equipment (including vehicle or aircraft), or consequences of actions of persons outside the control of the transport personnel      Based on these reasonable risks and benefits to the patient and/or the unborn child(debbie), and based upon the information available at the time of the patients examination, I certify that the medical benefits reasonably to be expected from the provision of appropriate medical treatments at another medical facility outweigh the increasing risks, if any, to the individuals medical condition, and in the case of labor to the unborn child, from effecting the transfer      X____________________________________________ DATE 08/25/18        TIME_______      ORIGINAL - SEND TO MEDICAL RECORDS   COPY - SEND WITH PATIENT DURING TRANSFER

## 2018-08-25 NOTE — ED NOTES
Intake / SA completed with patient  Patient presents to ED due to suicidal ideation with plan to OD  Patient states he is having command hallucinations that tell him to harm himself  Patient denies homicidal ideation  Patient reports he was recenlty discharged from inpatient rehab  Patient reports having a history of heroin use, but reports no use since August 12, 2018  Patient notes he was recently admitted to [address] [place] [address] due to suicidal thoughts  He states that since his discharge he has not felt right and that he has not been taking his medication as prescribed  Patient reports just finding out that his girlfriend was diagnosed with cancer  Patient reports not being able to sleep and poor appetite  History of depression and anxiety as well as bipolar  Patient willing to sign 201  Dr in agreement with inpatient psychiatric treatment

## 2018-08-25 NOTE — ED NOTES
Per Felicia Britt RN at [facility] "they already have the medical report and do not need a nursing report on patient"        Tiffany Cobb RN  08/25/18 4738

## 2018-08-25 NOTE — ED NOTES
Insurance Authorization:  Initial Authorization  Phone call placed to [facility]  Phone number: [phone]    Spoke to [employer]     3days approved  Level of care: Inpatient Psych 201 Admit  Review on 8/27/18  Authorization # pending admit    Celina Pérez needs to call

## 2023-03-21 ENCOUNTER — DOCUMENTATION (OUTPATIENT)
Dept: OTHER | Facility: HOSPITAL | Age: 53
End: 2023-03-21

## 2023-03-21 ENCOUNTER — TELEPHONE (OUTPATIENT)
Dept: OTHER | Facility: HOSPITAL | Age: 53
End: 2023-03-21

## 2023-03-21 DIAGNOSIS — K74.60 CIRRHOSIS (HCC): Primary | ICD-10-CM

## 2023-03-21 NOTE — PROGRESS NOTES
Pt presents for evaluation to the [facility] with [facility] at the [facility]  Pt notes he has a h/o hepatitis C s/p treatment, and dyshagia for solids  He has a h/o OUD and is on suboxone  He presents for eval of dry, cracking skin on R hand  Pt notes skin on his R hand was dry, cracking, and bleeding  No bleeding anywhere else  Notes [facility]  Vapes  Former smoker  Denies any LE edema  Denies increasing abd girth/swelling  Denies cp  Pt notes h/o dysphagia for solids  Notes he had been told he needs his "throat stretched"  He has to chop up any meat/chicken he eats into small pieces or he chokes  He notes he is currently homeless as his family has asked him to leave  Pt is currently staying in the [facility] area  He notes he does not have a PCP but is agreeable to establishing with new PCP at [facility] and new GI team     PE:  Vitals: 138/72  Gen: pleasant male  NAD  Able to speak in complete sentences w/o having to stop for dyspnea  Heart: RRR no m/r/g  Lungs; CTA bilat  No w/c/r  Ext:  R hand- very dry skin with cracks  No bleeding  No erythema/induration  No fluctuance  2+radial pulses  No LE edema  2+pedal pulses    A/p  1-h/o Hepatitis C:  -no lab work since 2016   -pt with prior treated Hep C  Needs further eval for possible cirrhosis  -referral sent for new PCP for labs and check up    2-dysphagia:  With h/o esophageal stricture, per pt's description with prior referral for EGD/dilation  -will send referral for GI eval    Pt also met with [place] nurse who will help coordinate his care    Pt notes he has current insurance with "[employer]"

## 2023-04-06 ENCOUNTER — HOSPITAL ENCOUNTER (EMERGENCY)
Facility: HOSPITAL | Age: 53
End: 2023-04-07
Attending: EMERGENCY MEDICINE

## 2023-04-06 ENCOUNTER — APPOINTMENT (EMERGENCY)
Dept: RADIOLOGY | Facility: HOSPITAL | Age: 53
End: 2023-04-06

## 2023-04-06 DIAGNOSIS — Z00.8 MEDICAL CLEARANCE FOR PSYCHIATRIC ADMISSION: ICD-10-CM

## 2023-04-06 DIAGNOSIS — R45.851 SUICIDAL IDEATION: ICD-10-CM

## 2023-04-06 DIAGNOSIS — F32.A DEPRESSION: Primary | ICD-10-CM

## 2023-04-06 LAB
ALBUMIN SERPL BCP-MCNC: 3.5 G/DL (ref 3.5–5)
ALP SERPL-CCNC: 102 U/L (ref 46–116)
ALT SERPL W P-5'-P-CCNC: 93 U/L (ref 12–78)
AMPHETAMINES SERPL QL SCN: NEGATIVE
ANION GAP SERPL CALCULATED.3IONS-SCNC: 3 MMOL/L (ref 4–13)
AST SERPL W P-5'-P-CCNC: 77 U/L (ref 5–45)
BARBITURATES UR QL: NEGATIVE
BASOPHILS # BLD AUTO: 0.02 THOUSANDS/ÂΜL (ref 0–0.1)
BASOPHILS NFR BLD AUTO: 0 % (ref 0–1)
BENZODIAZ UR QL: NEGATIVE
BILIRUB SERPL-MCNC: 0.68 MG/DL (ref 0.2–1)
BUN SERPL-MCNC: 12 MG/DL (ref 5–25)
CALCIUM SERPL-MCNC: 8.3 MG/DL (ref 8.3–10.1)
CHLORIDE SERPL-SCNC: 109 MMOL/L (ref 96–108)
CO2 SERPL-SCNC: 28 MMOL/L (ref 21–32)
COCAINE UR QL: POSITIVE
CREAT SERPL-MCNC: 0.82 MG/DL (ref 0.6–1.3)
EOSINOPHIL # BLD AUTO: 0.18 THOUSAND/ÂΜL (ref 0–0.61)
EOSINOPHIL NFR BLD AUTO: 4 % (ref 0–6)
ERYTHROCYTE [DISTWIDTH] IN BLOOD BY AUTOMATED COUNT: 16.7 % (ref 11.6–15.1)
ETHANOL EXG-MCNC: 0 MG/DL
ETHANOL EXG-MCNC: 0 MG/DL
ETHANOL SERPL-MCNC: <3 MG/DL (ref 0–3)
GFR SERPL CREATININE-BSD FRML MDRD: 103 ML/MIN/1.73SQ M
GLUCOSE SERPL-MCNC: 120 MG/DL (ref 65–140)
HCT VFR BLD AUTO: 37.7 % (ref 36.5–49.3)
HGB BLD-MCNC: 13 G/DL (ref 12–17)
IMM GRANULOCYTES # BLD AUTO: 0.01 THOUSAND/UL (ref 0–0.2)
IMM GRANULOCYTES NFR BLD AUTO: 0 % (ref 0–2)
LYMPHOCYTES # BLD AUTO: 1.21 THOUSANDS/ÂΜL (ref 0.6–4.47)
LYMPHOCYTES NFR BLD AUTO: 23 % (ref 14–44)
MCH RBC QN AUTO: 30.7 PG (ref 26.8–34.3)
MCHC RBC AUTO-ENTMCNC: 34.5 G/DL (ref 31.4–37.4)
MCV RBC AUTO: 89 FL (ref 82–98)
METHADONE UR QL: NEGATIVE
MONOCYTES # BLD AUTO: 0.35 THOUSAND/ÂΜL (ref 0.17–1.22)
MONOCYTES NFR BLD AUTO: 7 % (ref 4–12)
NEUTROPHILS # BLD AUTO: 3.41 THOUSANDS/ÂΜL (ref 1.85–7.62)
NEUTS SEG NFR BLD AUTO: 66 % (ref 43–75)
NRBC BLD AUTO-RTO: 0 /100 WBCS
OPIATES UR QL SCN: NEGATIVE
OXYCODONE+OXYMORPHONE UR QL SCN: NEGATIVE
PCP UR QL: NEGATIVE
PLATELET # BLD AUTO: 266 THOUSANDS/UL (ref 149–390)
PMV BLD AUTO: 9.7 FL (ref 8.9–12.7)
POTASSIUM SERPL-SCNC: 3.8 MMOL/L (ref 3.5–5.3)
PROT SERPL-MCNC: 6.7 G/DL (ref 6.4–8.4)
RBC # BLD AUTO: 4.24 MILLION/UL (ref 3.88–5.62)
SODIUM SERPL-SCNC: 140 MMOL/L (ref 135–147)
THC UR QL: NEGATIVE
TSH SERPL DL<=0.05 MIU/L-ACNC: 0.57 UIU/ML (ref 0.45–4.5)
WBC # BLD AUTO: 5.18 THOUSAND/UL (ref 4.31–10.16)

## 2023-04-06 RX ORDER — FLUOXETINE HYDROCHLORIDE 20 MG/1
20 CAPSULE ORAL DAILY
Status: DISCONTINUED | OUTPATIENT
Start: 2023-04-07 | End: 2023-04-07 | Stop reason: HOSPADM

## 2023-04-06 RX ORDER — QUETIAPINE FUMARATE 100 MG/1
200 TABLET, FILM COATED ORAL 2 TIMES DAILY
Status: DISCONTINUED | OUTPATIENT
Start: 2023-04-07 | End: 2023-04-07 | Stop reason: HOSPADM

## 2023-04-06 NOTE — ED PROVIDER NOTES
History  Chief Complaint   Patient presents with   • Psychiatric Evaluation     Pt states he has had SI over a month w/ a plan to OD; hx of attempts to OD in past  (-) [place]  • Detox Evaluation     Pt is looking to detox off of crack; last used a half hour ago  Pt states he needs his sublocade injection as he's overdue for it      82-year-old male with a past medical history of anxiety, bipolar, depression, hepatitis-C, substance abuse presents for evaluation of suicidal thoughts  Patient says he is having suicidal ideations for a while now particularly last few days he states the thoughts about killing himself increased  He has plans to overdose  Patient has had prior hospitalizations for suicidal ideation and attempts in the past   He denies any homicidal ideation he denies any auditory visual hallucinations  Patient states he needs to get off crack  Last use today  Patient is requesting detox to get off crack  He is denying chest pain or shortness of breath denies any nausea vomiting diarrhea or constipation denies any headache dizziness tinnitus vision change abdominal pain  Prior to Admission Medications   Prescriptions Last Dose Informant Patient Reported? Taking? FLUoxetine (PROzac) 20 mg capsule   No No   Sig: Take 1 capsule (20 mg total) by mouth daily Indications: Manic-Depression  Naltrexone (VIVITROL IM)   Yes No   Sig: Inject 1 Dose into the shoulder, thigh, or buttocks every 30 (thirty) days Due 9/11   QUEtiapine (SEROquel) 200 mg tablet   No No   Sig: Take 1 tablet (200 mg total) by mouth daily at bedtime Indications: Manic Phase of Manic-Depression     Patient taking differently: Take 300 mg by mouth 2 (two) times a day     naltrexone (REVIA) 50 mg tablet   No No   Sig: Take 1 tablet by mouth daily for 14 days      Facility-Administered Medications: None       Past Medical History:   Diagnosis Date   • Anxiety    • Bipolar I disorder, most recent episode depressed, severe with psychotic features ([facility] ) 2/23/2016   • Depression    • Foot infection    • Hepatitis C virus infection 02/23/2016    has had treatment   • Psychiatric illness    • Self-injurious behavior    • Substance abuse ([place] [address] )    • Suicide attempt [facility])    • Withdrawal symptoms, drug or narcotic ([place] [address] )        History reviewed  No pertinent surgical history  Family History   Problem Relation Age of Onset   • No Known Problems Mother    • No Known Problems Father    • No Known Problems Sister    • No Known Problems Brother    • No Known Problems Maternal Aunt    • No Known Problems Paternal Aunt    • No Known Problems Maternal Uncle    • No Known Problems Paternal Uncle    • No Known Problems Maternal Grandfather    • No Known Problems Maternal Grandmother    • No Known Problems Paternal Grandfather    • No Known Problems Paternal Grandmother    • No Known Problems Cousin    • ADD / ADHD Neg Hx    • Alcohol abuse Neg Hx    • Anxiety disorder Neg Hx    • Bipolar disorder Neg Hx    • Dementia Neg Hx    • Depression Neg Hx    • Drug abuse Neg Hx    • OCD Neg Hx    • Paranoid behavior Neg Hx    • Schizophrenia Neg Hx    • Seizures Neg Hx    • Self-Injury Neg Hx    • Suicide Attempts Neg Hx      I have reviewed and agree with the history as documented  E-Cigarette/Vaping   • E-Cigarette Use Current Every Day User      E-Cigarette/Vaping Substances     Social History     Tobacco Use   • Smoking status: Every Day     Packs/day: 0 50     Years: 15 00     Pack years: 7 50     Types: Cigarettes   • Smokeless tobacco: Never   Vaping Use   • Vaping Use: Every day   Substance Use Topics   • Alcohol use: No   • Drug use: Yes     Types: Heroin, Cocaine     Comment: Last use was on 8/12/18        Review of Systems   Constitutional: Negative for activity change, chills, fatigue and fever  HENT: Negative for congestion, ear pain and sore throat  Eyes: Negative for pain and visual disturbance     Respiratory: Negative for cough, chest tightness and shortness of breath  Cardiovascular: Negative for chest pain and palpitations  Gastrointestinal: Negative for abdominal pain, constipation, diarrhea, nausea and vomiting  Genitourinary: Negative for dysuria and hematuria  Musculoskeletal: Negative for arthralgias, back pain and neck pain  Skin: Negative for color change and rash  Neurological: Negative for dizziness, seizures, syncope, weakness, light-headedness, numbness and headaches  Psychiatric/Behavioral: Negative for agitation and behavioral problems  All other systems reviewed and are negative  Physical Exam  ED Triage Vitals [04/06/23 1855]   Temperature Pulse Respirations Blood Pressure SpO2   97 8 °F (36 6 °C) 81 18 139/74 99 %      Temp Source Heart Rate Source Patient Position - Orthostatic VS BP Location FiO2 (%)   Oral Monitor Lying Right arm --      Pain Score       --             Orthostatic Vital Signs  Vitals:    04/06/23 1855 04/07/23 1113   BP: 139/74 147/77   Pulse: 81 82   Patient Position - Orthostatic VS: Lying Lying       Physical Exam  Vitals and nursing note reviewed  Constitutional:       General: He is not in acute distress  Appearance: He is well-developed  HENT:      Head: Normocephalic and atraumatic  Right Ear: External ear normal       Left Ear: External ear normal       Nose: Nose normal       Mouth/Throat:      Mouth: Mucous membranes are moist    Eyes:      Extraocular Movements: Extraocular movements intact  Conjunctiva/sclera: Conjunctivae normal    Cardiovascular:      Rate and Rhythm: Normal rate and regular rhythm  Heart sounds: Normal heart sounds  No murmur heard  Pulmonary:      Effort: Pulmonary effort is normal  No respiratory distress  Breath sounds: Normal breath sounds  Abdominal:      General: Abdomen is flat  Palpations: Abdomen is soft  Tenderness: There is no abdominal tenderness  Musculoskeletal:         General: Swelling present  Cervical back: Normal range of motion and neck supple  Comments: Right knuckle swelling with scabbing   Skin:     General: Skin is warm and dry  Capillary Refill: Capillary refill takes less than 2 seconds  Neurological:      General: No focal deficit present  Mental Status: He is alert and oriented to person, place, and time  ED Medications  Medications   FLUoxetine (PROzac) capsule 20 mg (20 mg Oral Given 4/7/23 0937)   QUEtiapine (SEROquel) tablet 200 mg (200 mg Oral Not Given 4/7/23 0937)   midazolam (VERSED) injection 4 mg (4 mg Intramuscular Not Given 4/7/23 1126)   OLANZapine (ZyPREXA) IM injection 10 mg (10 mg Intramuscular Not Given 4/7/23 1126)       Diagnostic Studies  Results Reviewed     Procedure Component Value Units Date/Time    Rapid drug screen, urine [[id number]]  (Abnormal) Collected: 04/06/23 2131    Lab Status: Final result Specimen: Urine, Clean Catch Updated: 04/06/23 2222     Amph/Meth UR Negative     Barbiturate Ur Negative     Benzodiazepine Urine Negative     Cocaine Urine Positive     Methadone Urine Negative     Opiate Urine Negative     PCP Ur Negative     THC Urine Negative     Oxycodone Urine Negative    Narrative:      Presumptive report  If requested, specimen will be sent to reference lab for confirmation  FOR MEDICAL PURPOSES ONLY  IF CONFIRMATION NEEDED PLEASE CONTACT THE LAB WITHIN 5 DAYS      Drug Screen Cutoff Levels:  AMPHETAMINE/METHAMPHETAMINES  1000 ng/mL  BARBITURATES     200 ng/mL  BENZODIAZEPINES     200 ng/mL  COCAINE      300 ng/mL  METHADONE      300 ng/mL  OPIATES      300 ng/mL  PHENCYCLIDINE     25 ng/mL  THC       50 ng/mL  OXYCODONE      100 ng/mL    TSH [[id number]]  (Normal) Collected: 04/06/23 1930    Lab Status: Final result Specimen: Blood from Arm, Right Updated: 04/06/23 2009     TSH 3RD GENERATON 0 574 uIU/mL     Narrative:      Patients undergoing fluorescein dye angiography may retain small amounts of fluorescein in the body for 48-72 hours post procedure  Samples containing fluorescein can produce falsely depressed TSH values  If the patient had this procedure,a specimen should be resubmitted post fluorescein clearance        Comprehensive metabolic panel [[id number]]  (Abnormal) Collected: 04/06/23 1930    Lab Status: Final result Specimen: Blood from Arm, Right Updated: 04/06/23 2001     Sodium 140 mmol/L      Potassium 3 8 mmol/L      Chloride 109 mmol/L      CO2 28 mmol/L      ANION GAP 3 mmol/L      BUN 12 mg/dL      Creatinine 0 82 mg/dL      Glucose 120 mg/dL      Calcium 8 3 mg/dL      AST 77 U/L      ALT 93 U/L      Alkaline Phosphatase 102 U/L      Total Protein 6 7 g/dL      Albumin 3 5 g/dL      Total Bilirubin 0 68 mg/dL      eGFR 103 ml/min/1 73sq m     Narrative:      [place] guidelines for Chronic Kidney Disease (CKD):   •  Stage 1 with normal or high GFR (GFR > 90 mL/min/1 73 square meters)  •  Stage 2 Mild CKD (GFR = 60-89 mL/min/1 73 square meters)  •  Stage 3A Moderate CKD (GFR = 45-59 mL/min/1 73 square meters)  •  Stage 3B Moderate CKD (GFR = 30-44 mL/min/1 73 square meters)  •  Stage 4 Severe CKD (GFR = 15-29 mL/min/1 73 square meters)  •  Stage 5 End Stage CKD (GFR <15 mL/min/1 73 square meters)  Note: GFR calculation is accurate only with a steady state creatinine    Ethanol [[id number]]  (Normal) Collected: 04/06/23 1930    Lab Status: Final result Specimen: Blood from Arm, Right Updated: 04/06/23 1958     Ethanol Lvl <3 mg/dL     POCT alcohol breath test [[id number]]  (Normal) Resulted: 04/06/23 1940    Lab Status: Final result Updated: 04/06/23 1940     EXTBreath Alcohol 0 00    POCT alcohol breath test [[id number]]  (Normal) Resulted: 04/06/23 1940    Lab Status: Final result Updated: 04/06/23 1940     EXTBreath Alcohol 0 00    CBC and differential [[id number]]  (Abnormal) Collected: 04/06/23 1930    Lab Status: Final result Specimen: Blood from Arm, Right Updated: 04/06/23 1937     WBC 5 18 Thousand/uL      RBC 4 24 Million/uL      Hemoglobin 13 0 g/dL      Hematocrit 37 7 %      MCV 89 fL      MCH 30 7 pg      MCHC 34 5 g/dL      RDW 16 7 %      MPV 9 7 fL      Platelets 081 Thousands/uL      nRBC 0 /100 WBCs      Neutrophils Relative 66 %      Immat GRANS % 0 %      Lymphocytes Relative 23 %      Monocytes Relative 7 %      Eosinophils Relative 4 %      Basophils Relative 0 %      Neutrophils Absolute 3 41 Thousands/µL      Immature Grans Absolute 0 01 Thousand/uL      Lymphocytes Absolute 1 21 Thousands/µL      Monocytes Absolute 0 35 Thousand/µL      Eosinophils Absolute 0 18 Thousand/µL      Basophils Absolute 0 02 Thousands/µL                  XR hand 3+ views RIGHT   ED Interpretation by Debby Giles DO (04/06 2022)   No acute fractures noted  Final Result by Jennifer Lucas MD (04/07 1631)      No acute osseous abnormality  Workstation performed: [id number]               Procedures  Procedures      ED Course  ED Course as of 04/07/23 1435   Thu Apr 06, 2023 1936 Blood Pressure: 139/74   1936 Temperature: 97 8 °F (36 6 °C)   1936 Temp Source: Oral   1936 Pulse: 81   1936 Respirations: 18   1936 SpO2: 99 %   1936 Patient is a 51-year-old male presenting to the emergency department for evaluation of suicidal ideation with plan to overdose as well requesting detox  Patient recently took cocaine earlier today  He is denying chest pain or shortness of breath denies any nausea vomiting diarrhea or constipation denies any headache dizziness tinnitus vision change abdominal pain  On exam patient has right knuckle swelling with scabbing secondary to punching a wall few days ago  Will x-ray to evaluate for fractures  Started [facility] labs as well as spoke to our crisis worker for evaluation of patient  Patient is aware is no question concerns he would like to sign a 201 for SI with plan     1943 EXTBreath Alcohol: 0 00   2007 AST(!): 77   2007 ALT(!): 93  Patient with known hep c   [facility]   2143 201 willing to sign  Medically cleared for inpatient psych   [address](!): Positive   2323 201 signed  SBIRT 20yo+    Flowsheet Row Most Recent Value   SBIRT (25 yo +)    In order to provide better care to our patients, we are screening all of our patients for alcohol and drug use  Would it be okay to ask you these screening questions? Unable to answer at this time Filed at: 04/06/2023 1931                Medical Decision Making  See ED course for more information  Patient signed a 20 for SI  Patients placement pending  Signed out prior to placement  Amount and/or Complexity of Data Reviewed  Labs: ordered  Decision-making details documented in ED Course  Radiology: ordered and independent interpretation performed  Risk  Prescription drug management  Decision regarding hospitalization  Disposition  Final diagnoses:   Depression   Suicidal ideation     Time reflects when diagnosis was documented in both MDM as applicable and the Disposition within this note     Time User Action Codes Description Comment    4/6/2023  8:22 PM Henri [place] Add Laertes Osceola  A] Depression     4/6/2023  8:23 PM Henri Stearns Add [R45 851] Suicidal ideation     4/7/2023 11:00 AM Stan Botello Add [Z00 8] Medical clearance for psychiatric admission       ED Disposition     ED Disposition   Transfer to [address] [place] [address]   --    Date/Time   Thu Apr 6, 2023  8:23 PM    [address]  should be transferred out to  and has been medically cleared              MD Documentation    Sheila Hoang Most Recent Value   Patient Condition The patient has been stabilized such that within reasonable medical probability, no material deterioration of the patient condition or the condition of the unborn child(debbie) is likely to result from the transfer   Reason for Transfer Level of Care needed not available at this facility  [facility]   Benefits of Transfer Specialized equipment and/or services available at the receiving facility (Include comment)________________________   Risks of Transfer Potential for delay in receiving treatment, Potential deterioration of medical condition, Increased discomfort during transfer, Possible worsening of condition or death during transfer   Accepting Physician [address] Name, [address] [place] [address] Rd by (Company and Unit #) [facility]   Sending MD Uriel Patel   Provider Certification General risk, such as traffic hazards, adverse weather conditions, rough terrain or turbulence, possible failure of equipment (including vehicle or aircraft), or consequences of actions of persons outside the control of the transport personnel      RN Documentation    Flowsheet Row Most [address] Name, [address] [place] [address] Rd by (Company and Unit #) [facility]   Level of Care Basic life support      Follow-up Information    None         Discharge Medication List as of 4/7/2023  2:10 PM      CONTINUE these medications which have NOT CHANGED    Details   FLUoxetine (PROzac) 20 mg capsule Take 1 capsule (20 mg total) by mouth daily Indications: Manic-Depression  , Starting 3/21/2016, Until Discontinued, Print      naltrexone (REVIA) 50 mg tablet Take 1 tablet by mouth daily for 14 days, Starting Sun 9/10/2017, Until Sun 9/24/2017, Print      Naltrexone (VIVITROL IM) Inject 1 Dose into the shoulder, thigh, or buttocks every 30 (thirty) days Due 9/11, Historical Med      QUEtiapine (SEROquel) 200 mg tablet Take 1 tablet (200 mg total) by mouth daily at bedtime Indications: Manic Phase of Manic-Depression  , Starting 3/21/2016, Until Discontinued, Print           No discharge procedures on file  PDMP Review     None           ED Provider  Attending physically available and evaluated Talita Roy I managed the patient along with the ED Attending      Electronically Signed by         Mikey Cuenca DO  04/07/23 4739

## 2023-04-07 VITALS
TEMPERATURE: 97.8 F | DIASTOLIC BLOOD PRESSURE: 77 MMHG | HEART RATE: 82 BPM | RESPIRATION RATE: 16 BRPM | OXYGEN SATURATION: 100 % | SYSTOLIC BLOOD PRESSURE: 147 MMHG

## 2023-04-07 PROBLEM — Z00.8 MEDICAL CLEARANCE FOR PSYCHIATRIC ADMISSION: Status: ACTIVE | Noted: 2023-04-07

## 2023-04-07 RX ORDER — IBUPROFEN 400 MG/1
800 TABLET ORAL EVERY 8 HOURS PRN
Status: CANCELLED | OUTPATIENT
Start: 2023-04-07

## 2023-04-07 RX ORDER — OLANZAPINE 10 MG/1
5 INJECTION, POWDER, LYOPHILIZED, FOR SOLUTION INTRAMUSCULAR
Status: CANCELLED | OUTPATIENT
Start: 2023-04-07

## 2023-04-07 RX ORDER — DIPHENHYDRAMINE HYDROCHLORIDE 50 MG/ML
50 INJECTION INTRAMUSCULAR; INTRAVENOUS EVERY 6 HOURS PRN
Status: CANCELLED | OUTPATIENT
Start: 2023-04-07

## 2023-04-07 RX ORDER — BENZTROPINE MESYLATE 1 MG/ML
1 INJECTION INTRAMUSCULAR; INTRAVENOUS
Status: CANCELLED | OUTPATIENT
Start: 2023-04-07

## 2023-04-07 RX ORDER — QUETIAPINE FUMARATE 100 MG/1
200 TABLET, FILM COATED ORAL 2 TIMES DAILY
Status: CANCELLED | OUTPATIENT
Start: 2023-04-07

## 2023-04-07 RX ORDER — HYDROXYZINE HYDROCHLORIDE 25 MG/1
100 TABLET, FILM COATED ORAL
Status: CANCELLED | OUTPATIENT
Start: 2023-04-07

## 2023-04-07 RX ORDER — LORAZEPAM 2 MG/ML
2 INJECTION INTRAMUSCULAR EVERY 6 HOURS PRN
Status: CANCELLED | OUTPATIENT
Start: 2023-04-07

## 2023-04-07 RX ORDER — HYDROXYZINE HYDROCHLORIDE 25 MG/1
25 TABLET, FILM COATED ORAL
Status: CANCELLED | OUTPATIENT
Start: 2023-04-07

## 2023-04-07 RX ORDER — OLANZAPINE 2.5 MG/1
2.5 TABLET ORAL
Status: CANCELLED | OUTPATIENT
Start: 2023-04-07

## 2023-04-07 RX ORDER — LANOLIN ALCOHOL/MO/W.PET/CERES
3 CREAM (GRAM) TOPICAL
Status: CANCELLED | OUTPATIENT
Start: 2023-04-07

## 2023-04-07 RX ORDER — OLANZAPINE 10 MG/1
10 INJECTION, POWDER, LYOPHILIZED, FOR SOLUTION INTRAMUSCULAR
Status: CANCELLED | OUTPATIENT
Start: 2023-04-07

## 2023-04-07 RX ORDER — IBUPROFEN 400 MG/1
400 TABLET ORAL EVERY 4 HOURS PRN
Status: CANCELLED | OUTPATIENT
Start: 2023-04-07

## 2023-04-07 RX ORDER — AMOXICILLIN 250 MG
1 CAPSULE ORAL DAILY PRN
Status: CANCELLED | OUTPATIENT
Start: 2023-04-07

## 2023-04-07 RX ORDER — BENZTROPINE MESYLATE 1 MG/1
1 TABLET ORAL
Status: CANCELLED | OUTPATIENT
Start: 2023-04-07

## 2023-04-07 RX ORDER — POLYETHYLENE GLYCOL 3350 17 G/17G
17 POWDER, FOR SOLUTION ORAL DAILY PRN
Status: CANCELLED | OUTPATIENT
Start: 2023-04-07

## 2023-04-07 RX ORDER — OLANZAPINE 10 MG/1
10 TABLET ORAL
Status: CANCELLED | OUTPATIENT
Start: 2023-04-07

## 2023-04-07 RX ORDER — MAGNESIUM HYDROXIDE/ALUMINUM HYDROXICE/SIMETHICONE 120; 1200; 1200 MG/30ML; MG/30ML; MG/30ML
30 SUSPENSION ORAL EVERY 4 HOURS PRN
Status: CANCELLED | OUTPATIENT
Start: 2023-04-07

## 2023-04-07 RX ORDER — OLANZAPINE 10 MG/1
10 INJECTION, POWDER, LYOPHILIZED, FOR SOLUTION INTRAMUSCULAR ONCE
Status: DISCONTINUED | OUTPATIENT
Start: 2023-04-07 | End: 2023-04-07 | Stop reason: HOSPADM

## 2023-04-07 RX ORDER — OLANZAPINE 10 MG/1
5 TABLET ORAL
Status: CANCELLED | OUTPATIENT
Start: 2023-04-07

## 2023-04-07 RX ORDER — FLUOXETINE HYDROCHLORIDE 20 MG/1
20 CAPSULE ORAL DAILY
Status: CANCELLED | OUTPATIENT
Start: 2023-04-08

## 2023-04-07 RX ORDER — HYDROXYZINE HYDROCHLORIDE 25 MG/1
50 TABLET, FILM COATED ORAL
Status: CANCELLED | OUTPATIENT
Start: 2023-04-07

## 2023-04-07 RX ORDER — MIDAZOLAM HYDROCHLORIDE 2 MG/2ML
4 INJECTION, SOLUTION INTRAMUSCULAR; INTRAVENOUS ONCE
Status: DISCONTINUED | OUTPATIENT
Start: 2023-04-07 | End: 2023-04-07 | Stop reason: HOSPADM

## 2023-04-07 RX ORDER — IBUPROFEN 600 MG/1
600 TABLET ORAL EVERY 6 HOURS PRN
Status: CANCELLED | OUTPATIENT
Start: 2023-04-07

## 2023-04-07 RX ADMIN — FLUOXETINE 20 MG: 20 CAPSULE ORAL at 09:37

## 2023-04-07 NOTE — EMTALA/ACUTE CARE TRANSFER
[address] [facility] [address]  Dept: Cathie    NAME Gina Perez   1970                              MRN [id number]    I have been informed of my rights regarding examination, treatment, and transfer   by Dr Mckay Rahman MD    Benefits: Specialized equipment and/or services available at the receiving facility (Include comment)________________________    Risks: Potential for delay in receiving treatment, Potential deterioration of medical condition, Increased discomfort during transfer, Possible worsening of condition or death during transfer      Transfer Request   I acknowledge that my medical condition has been evaluated and explained to me by the emergency department physician or other qualified medical person and/or my attending physician who has recommended and offered to me further medical examination and treatment  I understand the Hospital's obligation with respect to the treatment and stabilization of my emergency medical condition  I nevertheless request to be transferred  I release the Hospital, the doctor, and any other persons caring for me from all responsibility or liability for any injury or ill effects that may result from my transfer and agree to accept all responsibility for the consequences of my choice to transfer, rather than receive stabilizing treatment at the Hospital  I understand that because the transfer is my request, my insurance may not provide reimbursement for the services  The Hospital will assist and direct me and my family in how to make arrangements for transfer, but the hospital is not liable for any fees charged by the transport service    In spite of this understanding, I refuse to consent to further medical examination and treatment which has been offered to me, and request transfer to  [facility] Sanchez Name, [address] : [facility]  I authorize the performance of emergency medical procedures and treatments upon me in both transit and upon arrival at the receiving facility  Additionally, I authorize the release of any and all medical records to the receiving facility and request they be transported with me, if possible  I authorize the performance of emergency medical procedures and treatments upon me in both transit and upon arrival at the receiving facility  Additionally, I authorize the release of any and all medical records to the receiving facility and request they be transported with me, if possible  I understand that the safest mode of transportation during a medical emergency is an ambulance and that the Hospital advocates the use of this mode of transport  Risks of traveling to the receiving facility by car, including absence of medical control, life sustaining equipment, such as oxygen, and medical personnel has been explained to me and I fully understand them  (ORIN CORRECT BOX BELOW)  [  ]  I consent to the stated transfer and to be transported by ambulance/helicopter  [  ]  I consent to the stated transfer, but refuse transportation by ambulance and accept full responsibility for my transportation by car  I understand the risks of non-ambulance transfers and I exonerate the Hospital and its staff from any deterioration in my condition that results from this refusal     X___________________________________________    DATE  23  TIME________  Signature of patient or legally responsible individual signing on patient behalf           RELATIONSHIP TO PATIENT_________________________          Provider [address] [place] [address] 1970                              MRN [id number]    A medical screening exam was performed on the above named patient  Based on the examination:    Condition Necessitating Transfer The primary encounter diagnosis was Depression  Diagnoses of Suicidal ideation and Medical clearance for psychiatric admission were also pertinent to this visit  Patient Condition: The patient has been stabilized such that within reasonable medical probability, no material deterioration of the patient condition or the condition of the unborn child(debbie) is likely to result from the transfer    Reason for Transfer: Level of Care needed not available at this facility [address])    Transfer Requirements: [address]   · Space available and qualified personnel available for treatment as acknowledged by    · Agreed to accept transfer and to provide appropriate medical treatment as acknowledged by       [place]  · Appropriate medical records of the examination and treatment of the patient are provided at the time of transfer   [address] _______  · Transfer will be performed by qualified personnel from    and appropriate transfer equipment as required, including the use of necessary and appropriate life support measures  Provider Certification: I have examined the patient and explained the following risks and benefits of being transferred/refusing transfer to the patient/family:  General risk, such as traffic hazards, adverse weather conditions, rough terrain or turbulence, possible failure of equipment (including vehicle or aircraft), or consequences of actions of persons outside the control of the transport personnel      Based on these reasonable risks and benefits to the patient and/or the unborn child(debbie), and based upon the information available at the time of the patient’s examination, I certify that the medical benefits reasonably to be expected from the provision of appropriate medical treatments at another medical facility outweigh the increasing risks, if any, to the individual’s medical condition, and in the case of labor to the unborn child, from effecting the transfer      X____________________________________________ DATE 04/07/23        TIME_______      ORIGINAL - SEND TO MEDICAL RECORDS   COPY - SEND WITH PATIENT DURING TRANSFER

## 2023-04-07 NOTE — ED NOTES
"50year old male with suicidal ideation  Patient is alert and oriented during assessment and states \"what the fuck took you so long\" and then stated \"I'm not answering your fucking questions\"  Patient signed a 12  Patient is homeless  History states anxiety, bipolar, depression  Patient does crack  Has several  past hospitalizations     "

## 2023-04-07 NOTE — ED NOTES
First part of [address] [place] [address] done  Called [address] [place] [address] and spoke to BALJINDER  Call back when bed is found

## 2023-04-07 NOTE — ED PROVIDER NOTES
Psychiatry Reassessment Note 04/07/23    Subjective    Patient is a 53-year-old male with a significant past medical history of anxiety, bipolar, depression, hepatitis C, currently department with increasing suicidal ideations over the last several weeks  He states that he has been having thoughts to overdose on medication but has not attempted this  He denies any homicidal ideations, or AVH  He is also seeking rehabilitation for his crack cocaine use  Last use was prior to arrival on 4/6/2023  He denies any other acute complaints at this time  Patient has no complaints this morning      Objective  Vitals:    04/06/23 1855   BP: 139/74   BP Location: Right arm   Pulse: 81   Resp: 18   Temp: 97 8 °F (36 6 °C)   TempSrc: Oral   SpO2: 99%       GENERAL APPEARANCE: NAD  NEURO: GCS 15, neuro grossly intact  HEENT: MMM  CV: RRR  LUNGS: CTAB  GI: soft, NT, ND  MSK: moves all extremities  SKIN: intact      Assessment/Plan  -Patient remains medically cleared for inpatient psychiatry or inpatient behavioral health and rehab  -Crisis following the patient  -Bed search pending         Doris Sánchez DO  04/07/23 1058

## 2023-04-07 NOTE — ED NOTES
Pt agitated  Escorted to secure holding  1:1 in place        Altagracia Best, MIRELLA  04/07/23 2866

## 2023-04-07 NOTE — ED NOTES
Patient is accepted at [facility]  Patient is accepted by Dr Dannie Mabry per Torsten Marion     Waiting on transport time      Nurse report is to be called to [phone] prior to patient transfer

## 2023-04-08 PROBLEM — F14.10 COCAINE ABUSE (HCC): Status: ACTIVE | Noted: 2023-04-08

## 2023-04-08 PROBLEM — R23.4 SKIN FISSURES: Status: ACTIVE | Noted: 2023-04-08

## 2023-04-09 NOTE — ED ATTENDING ATTESTATION
4/6/2023  ICorky MD, saw and evaluated the patient  I have discussed the patient with the resident/non-physician practitioner and agree with the resident's/non-physician practitioner's findings, Plan of Care, and MDM as documented in the resident's/non-physician practitioner's note, except where noted  All available labs and Radiology studies were reviewed  I was present for key portions of any procedure(s) performed by the resident/non-physician practitioner and I was immediately available to provide assistance  At this point I agree with the current assessment done in the Emergency Department  I have conducted an independent evaluation of this patient a history and physical is as follows:  26-year-old male with a history of depression, and cocaine abuse presents with suicidal ideation and request for detox from cocaine use  Last use was proxy 1 hour prior to arrival   Patient also states that he is overdue for Sublocade injection  Patient also admits to recent injury to his right hand after punching a wall  Denies any symptoms at this time  Vitals reviewed  Heart regular rate and rhythm without murmurs  Lungs clear to auscultation bilaterally  Ab soft nontender nondistended normal bowel sounds  Extremities right knuckle swelling with scabbing present  No signs of infection erythema or drainage  Decreased range of motion of right hand with closing fist     Impression: Suicidal ideation, history of polysubstance abuse blunt trauma to right hand  Differential diagnosis: Depression, cocaine and heroin abuse, metabolic disorder, closed fracture right hand    Plan to obtain rapid drug screen, TSH, CMP, ethanol, CBC x-ray of right hand patient treated with IM Zyprexa midazolam for symptoms      ED Course     Labs reviewed UDS positive for cocaine  TSH within normal limits CMP remarkable for elevated transaminases normal ethanol level  CBC unremarkable      Right hand x-ray independently interpreted by me no acute fracture or dislocation  Patient was seen by behavioral health Case discussed with behavioral health will have patient's sign in voluntarily for depression and substance use disorder  Pending bed placement    Critical Care Time  Procedures

## 2023-04-10 PROBLEM — R23.4 SKIN FISSURES: Status: RESOLVED | Noted: 2023-04-08 | Resolved: 2023-04-10

## 2023-04-10 PROBLEM — Z00.8 MEDICAL CLEARANCE FOR PSYCHIATRIC ADMISSION: Status: RESOLVED | Noted: 2023-04-07 | Resolved: 2023-04-10

## 2023-04-10 PROBLEM — F11.90 OPIOID USE DISORDER: Status: ACTIVE | Noted: 2023-04-10

## 2023-05-01 ENCOUNTER — HOSPITAL ENCOUNTER (INPATIENT)
Facility: HOSPITAL | Age: 53
LOS: 10 days | Discharge: OTHER INSTITUTION | End: 2023-05-12
Attending: EMERGENCY MEDICINE | Admitting: STUDENT IN AN ORGANIZED HEALTH CARE EDUCATION/TRAINING PROGRAM

## 2023-05-01 DIAGNOSIS — E55.9 VITAMIN D DEFICIENCY: ICD-10-CM

## 2023-05-01 DIAGNOSIS — G47.00 INSOMNIA, UNSPECIFIED TYPE: ICD-10-CM

## 2023-05-01 DIAGNOSIS — Z00.8 MEDICAL CLEARANCE FOR PSYCHIATRIC ADMISSION: ICD-10-CM

## 2023-05-01 DIAGNOSIS — F11.90 OPIOID USE DISORDER: ICD-10-CM

## 2023-05-01 DIAGNOSIS — F14.10 COCAINE ABUSE (HCC): ICD-10-CM

## 2023-05-01 DIAGNOSIS — R45.851 DEPRESSION WITH SUICIDAL IDEATION: ICD-10-CM

## 2023-05-01 DIAGNOSIS — F39 MOOD DISORDER (HCC): Primary | ICD-10-CM

## 2023-05-01 DIAGNOSIS — R63.4 WEIGHT LOSS: ICD-10-CM

## 2023-05-01 DIAGNOSIS — F32.A DEPRESSION WITH SUICIDAL IDEATION: ICD-10-CM

## 2023-05-01 LAB
2HR DELTA HS TROPONIN: 0 NG/L
ALBUMIN SERPL BCP-MCNC: 4.1 G/DL (ref 3.5–5)
ALP SERPL-CCNC: 78 U/L (ref 34–104)
ALT SERPL W P-5'-P-CCNC: 27 U/L (ref 7–52)
AMPHETAMINES SERPL QL SCN: POSITIVE
ANION GAP SERPL CALCULATED.3IONS-SCNC: 9 MMOL/L (ref 4–13)
AST SERPL W P-5'-P-CCNC: 32 U/L (ref 13–39)
ATRIAL RATE: 62 BPM
BARBITURATES UR QL: NEGATIVE
BASOPHILS # BLD AUTO: 0.03 THOUSANDS/ÂΜL (ref 0–0.1)
BASOPHILS NFR BLD AUTO: 0 % (ref 0–1)
BENZODIAZ UR QL: NEGATIVE
BILIRUB SERPL-MCNC: 0.98 MG/DL (ref 0.2–1)
BUN SERPL-MCNC: 14 MG/DL (ref 5–25)
CALCIUM SERPL-MCNC: 9.3 MG/DL (ref 8.4–10.2)
CARDIAC TROPONIN I PNL SERPL HS: 13 NG/L
CARDIAC TROPONIN I PNL SERPL HS: 13 NG/L
CHLORIDE SERPL-SCNC: 102 MMOL/L (ref 96–108)
CO2 SERPL-SCNC: 26 MMOL/L (ref 21–32)
COCAINE UR QL: POSITIVE
CREAT SERPL-MCNC: 0.62 MG/DL (ref 0.6–1.3)
EOSINOPHIL # BLD AUTO: 0.12 THOUSAND/ÂΜL (ref 0–0.61)
EOSINOPHIL NFR BLD AUTO: 1 % (ref 0–6)
ERYTHROCYTE [DISTWIDTH] IN BLOOD BY AUTOMATED COUNT: 16.1 % (ref 11.6–15.1)
ETHANOL EXG-MCNC: 0 MG/DL
GFR SERPL CREATININE-BSD FRML MDRD: 115 ML/MIN/1.73SQ M
GLUCOSE SERPL-MCNC: 88 MG/DL (ref 65–140)
HCT VFR BLD AUTO: 43.7 % (ref 36.5–49.3)
HGB BLD-MCNC: 14.8 G/DL (ref 12–17)
IMM GRANULOCYTES # BLD AUTO: 0.03 THOUSAND/UL (ref 0–0.2)
IMM GRANULOCYTES NFR BLD AUTO: 0 % (ref 0–2)
LYMPHOCYTES # BLD AUTO: 0.88 THOUSANDS/ÂΜL (ref 0.6–4.47)
LYMPHOCYTES NFR BLD AUTO: 11 % (ref 14–44)
MAGNESIUM SERPL-MCNC: 2.1 MG/DL (ref 1.9–2.7)
MCH RBC QN AUTO: 30.9 PG (ref 26.8–34.3)
MCHC RBC AUTO-ENTMCNC: 33.9 G/DL (ref 31.4–37.4)
MCV RBC AUTO: 91 FL (ref 82–98)
METHADONE UR QL: NEGATIVE
MONOCYTES # BLD AUTO: 0.54 THOUSAND/ÂΜL (ref 0.17–1.22)
MONOCYTES NFR BLD AUTO: 7 % (ref 4–12)
NEUTROPHILS # BLD AUTO: 6.77 THOUSANDS/ÂΜL (ref 1.85–7.62)
NEUTS SEG NFR BLD AUTO: 81 % (ref 43–75)
NRBC BLD AUTO-RTO: 0 /100 WBCS
OPIATES UR QL SCN: NEGATIVE
OXYCODONE+OXYMORPHONE UR QL SCN: NEGATIVE
P AXIS: 71 DEGREES
PCP UR QL: NEGATIVE
PLATELET # BLD AUTO: 255 THOUSANDS/UL (ref 149–390)
PMV BLD AUTO: 8.6 FL (ref 8.9–12.7)
POTASSIUM SERPL-SCNC: 3.8 MMOL/L (ref 3.5–5.3)
PR INTERVAL: 144 MS
PROT SERPL-MCNC: 7.2 G/DL (ref 6.4–8.4)
QRS AXIS: 68 DEGREES
QRSD INTERVAL: 90 MS
QT INTERVAL: 414 MS
QTC INTERVAL: 420 MS
RBC # BLD AUTO: 4.79 MILLION/UL (ref 3.88–5.62)
SODIUM SERPL-SCNC: 137 MMOL/L (ref 135–147)
T WAVE AXIS: 64 DEGREES
THC UR QL: NEGATIVE
VENTRICULAR RATE: 62 BPM
WBC # BLD AUTO: 8.37 THOUSAND/UL (ref 4.31–10.16)

## 2023-05-01 NOTE — ED NOTES
Patient requested pretzels and peanut butter crackers pt provided with same      Son Bryant  05/01/23 1539

## 2023-05-01 NOTE — ED NOTES
Bed search to be initiated once Pt remaining labs are resulted for medical clearance  201 was sent to Intake in the event of a network bed becoming available

## 2023-05-01 NOTE — ED NOTES
Assumed care of patient  Patient sleeping comfortably in bed with no signs of distress at this time  Virtual 1:1 and Q15 continue for patient safety        Illa Duane, RN  05/01/23 5064

## 2023-05-01 NOTE — ED NOTES
Patient upset that he can not have the wired TV remote  RN explained to patient why and patient does not seem to care  Provider made aware        Valencia Lowe RN  05/01/23 6907
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
## 2023-05-01 NOTE — ED NOTES
Insurance Authorization for admission:   Phone call placed to **  Phone number: **      Spoke to **      ** days approved  Level of care: **   Review on **  Authorization # **         EVS (Eligibility Verification System) called - [phone]  Automated system indicates: **    Insurance Authorization for Transportation:    Phone call placed to **  Phone number **  Spoke to **     Authorization #: **

## 2023-05-01 NOTE — ED NOTES
Patient resting in room  No signs of distress noted  Q15 minutes and virtual monitoring remain in place          Arley Stringer RN  05/01/23 6051

## 2023-05-01 NOTE — ED NOTES
Bed search conducted-   [address]- only female dual  [address] [address] appropriate beds  [facility]- No answer  [address]- no beds tonight, check tomorrow  [address]- no dual  [facility]- no answer  [facility]- no dual  [address]- no beds  [facility]- send clinical
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
## 2023-05-02 RX ORDER — LORAZEPAM 2 MG/ML
1 INJECTION INTRAMUSCULAR
Status: DISCONTINUED | OUTPATIENT
Start: 2023-05-02 | End: 2023-05-12 | Stop reason: HOSPADM

## 2023-05-02 RX ORDER — HYDROXYZINE HYDROCHLORIDE 25 MG/1
25 TABLET, FILM COATED ORAL
Status: DISCONTINUED | OUTPATIENT
Start: 2023-05-02 | End: 2023-05-12 | Stop reason: HOSPADM

## 2023-05-02 RX ORDER — PROPRANOLOL HYDROCHLORIDE 10 MG/1
10 TABLET ORAL EVERY 8 HOURS PRN
Status: DISCONTINUED | OUTPATIENT
Start: 2023-05-02 | End: 2023-05-12 | Stop reason: HOSPADM

## 2023-05-02 RX ORDER — QUETIAPINE FUMARATE 100 MG/1
200 TABLET, FILM COATED ORAL
Status: DISCONTINUED | OUTPATIENT
Start: 2023-05-02 | End: 2023-05-12 | Stop reason: HOSPADM

## 2023-05-02 RX ORDER — LANOLIN ALCOHOL/MO/W.PET/CERES
3 CREAM (GRAM) TOPICAL
Status: DISCONTINUED | OUTPATIENT
Start: 2023-05-02 | End: 2023-05-05

## 2023-05-02 RX ORDER — MAGNESIUM HYDROXIDE/ALUMINUM HYDROXICE/SIMETHICONE 120; 1200; 1200 MG/30ML; MG/30ML; MG/30ML
30 SUSPENSION ORAL EVERY 4 HOURS PRN
Status: DISCONTINUED | OUTPATIENT
Start: 2023-05-02 | End: 2023-05-12 | Stop reason: HOSPADM

## 2023-05-02 RX ORDER — IBUPROFEN 400 MG/1
800 TABLET ORAL EVERY 8 HOURS PRN
Status: DISCONTINUED | OUTPATIENT
Start: 2023-05-02 | End: 2023-05-12 | Stop reason: HOSPADM

## 2023-05-02 RX ORDER — BENZTROPINE MESYLATE 1 MG/ML
1 INJECTION INTRAMUSCULAR; INTRAVENOUS
Status: DISCONTINUED | OUTPATIENT
Start: 2023-05-02 | End: 2023-05-12 | Stop reason: HOSPADM

## 2023-05-02 RX ORDER — BENZTROPINE MESYLATE 1 MG/1
1 TABLET ORAL
Status: DISCONTINUED | OUTPATIENT
Start: 2023-05-02 | End: 2023-05-12 | Stop reason: HOSPADM

## 2023-05-02 RX ORDER — HALOPERIDOL 5 MG/ML
2.5 INJECTION INTRAMUSCULAR
Status: DISCONTINUED | OUTPATIENT
Start: 2023-05-02 | End: 2023-05-12 | Stop reason: HOSPADM

## 2023-05-02 RX ORDER — LORAZEPAM 2 MG/ML
2 INJECTION INTRAMUSCULAR
Status: DISCONTINUED | OUTPATIENT
Start: 2023-05-02 | End: 2023-05-12 | Stop reason: HOSPADM

## 2023-05-02 RX ORDER — IBUPROFEN 600 MG/1
600 TABLET ORAL EVERY 6 HOURS PRN
Status: DISCONTINUED | OUTPATIENT
Start: 2023-05-02 | End: 2023-05-12 | Stop reason: HOSPADM

## 2023-05-02 RX ORDER — LORAZEPAM 2 MG/ML
2 INJECTION INTRAMUSCULAR EVERY 6 HOURS PRN
Status: DISCONTINUED | OUTPATIENT
Start: 2023-05-02 | End: 2023-05-12 | Stop reason: HOSPADM

## 2023-05-02 RX ORDER — HALOPERIDOL 5 MG/1
5 TABLET ORAL
Status: DISCONTINUED | OUTPATIENT
Start: 2023-05-02 | End: 2023-05-12 | Stop reason: HOSPADM

## 2023-05-02 RX ORDER — HALOPERIDOL 5 MG/ML
5 INJECTION INTRAMUSCULAR
Status: DISCONTINUED | OUTPATIENT
Start: 2023-05-02 | End: 2023-05-12 | Stop reason: HOSPADM

## 2023-05-02 RX ORDER — IBUPROFEN 400 MG/1
400 TABLET ORAL EVERY 4 HOURS PRN
Status: DISCONTINUED | OUTPATIENT
Start: 2023-05-02 | End: 2023-05-12 | Stop reason: HOSPADM

## 2023-05-02 RX ORDER — AMOXICILLIN 250 MG
1 CAPSULE ORAL DAILY PRN
Status: DISCONTINUED | OUTPATIENT
Start: 2023-05-02 | End: 2023-05-12 | Stop reason: HOSPADM

## 2023-05-02 RX ORDER — HYDROXYZINE 50 MG/1
50 TABLET, FILM COATED ORAL
Status: DISCONTINUED | OUTPATIENT
Start: 2023-05-02 | End: 2023-05-12 | Stop reason: HOSPADM

## 2023-05-02 RX ORDER — HALOPERIDOL 1 MG/1
2 TABLET ORAL
Status: DISCONTINUED | OUTPATIENT
Start: 2023-05-02 | End: 2023-05-12 | Stop reason: HOSPADM

## 2023-05-02 RX ORDER — FLUOXETINE HYDROCHLORIDE 20 MG/1
20 CAPSULE ORAL DAILY
Status: DISCONTINUED | OUTPATIENT
Start: 2023-05-03 | End: 2023-05-10

## 2023-05-02 RX ORDER — BENZTROPINE MESYLATE 1 MG/ML
0.5 INJECTION INTRAMUSCULAR; INTRAVENOUS
Status: DISCONTINUED | OUTPATIENT
Start: 2023-05-02 | End: 2023-05-12 | Stop reason: HOSPADM

## 2023-05-02 RX ORDER — HYDROXYZINE 50 MG/1
100 TABLET, FILM COATED ORAL
Status: DISCONTINUED | OUTPATIENT
Start: 2023-05-02 | End: 2023-05-12 | Stop reason: HOSPADM

## 2023-05-02 RX ORDER — DIPHENHYDRAMINE HYDROCHLORIDE 50 MG/ML
50 INJECTION INTRAMUSCULAR; INTRAVENOUS EVERY 6 HOURS PRN
Status: DISCONTINUED | OUTPATIENT
Start: 2023-05-02 | End: 2023-05-12 | Stop reason: HOSPADM

## 2023-05-02 RX ORDER — LANOLIN ALCOHOL/MO/W.PET/CERES
3 CREAM (GRAM) TOPICAL
Status: DISCONTINUED | OUTPATIENT
Start: 2023-05-02 | End: 2023-05-12 | Stop reason: HOSPADM

## 2023-05-02 RX ADMIN — QUETIAPINE FUMARATE 200 MG: 100 TABLET ORAL at 21:00

## 2023-05-02 NOTE — PLAN OF CARE
Problem: SELF HARM/SUICIDALITY  Goal: Will have no self-injury during hospital stay  Description: INTERVENTIONS:  - Q 15 MINUTES: Routine safety checks  - Q WAKING SHIFT & PRN: Assess risk to determine if routine checks are adequate to maintain patient safety  - Encourage patient to participate actively in care by formulating a plan to combat response to suicidal ideation, identify supports and resources  Outcome: Progressing     Problem: DEPRESSION  Goal: Will be euthymic at discharge  Description: INTERVENTIONS:  - Administer medication as ordered  - Provide emotional support via 1:1 interaction with staff  - Encourage involvement in milieu/groups/activities  - Monitor for social isolation  Outcome: Progressing     Problem: BEHAVIOR  Goal: Pt/Family maintain appropriate behavior and adhere to behavioral management agreement, if implemented  Description: INTERVENTIONS:  - Assess the family dynamic   - Encourage verbalization of thoughts and concerns in a socially appropriate manner  - Assess patient/family's coping skills and non-compliant behavior (including use of illegal substances)  - Utilize positive, consistent limit setting strategies supporting safety of patient, staff and others  - Initiate consult with Case Management, Spiritual Care or other ancillary services as appropriate  - If a patient's/visitor's behavior jeopardizes the safety of the patient, staff, or others, refer to organization procedure     - Notify Security of behavior or suspected illegal substances which indicate the need for search of the patient and/or belongings  - Encourage participation in the decision making process about a behavioral management agreement; implement if patient meets criteria  Outcome: Progressing     Problem: SUBSTANCE USE/ABUSE  Goal: By discharge, will develop insight into their chemical dependency and sustain motivation to continue in recovery  Description: INTERVENTIONS:  - Attends all daily group sessions and scheduled AA groups  - Actively practices coping skills through participation in the therapeutic community and adherence to program rules  - Reviews and completes assignments from individual treatment plan  - Assist patient development of understanding of their personal cycle of addiction and relapse triggers  Outcome: Progressing     Problem: DISCHARGE PLANNING - CARE MANAGEMENT  Goal: Discharge to post-acute care or home with appropriate resources  Description: INTERVENTIONS:  - Conduct assessment to determine patient/family and health care team treatment goals, and need for post-acute services based on payer coverage, community resources, and patient preferences, and barriers to discharge  - Address psychosocial, clinical, and financial barriers to discharge as identified in assessment in conjunction with the patient/family and health care team  - Arrange appropriate level of post-acute services according to patient’s   needs and preference and payer coverage in collaboration with the physician and health care team  - Communicate with and update the patient/family, physician, and health care team regarding progress on the discharge plan  - Arrange appropriate transportation to post-acute venues  Outcome: Progressing

## 2023-05-02 NOTE — ED CARE HANDOFF
Emergency Department Sign Out Note        Sign out and transfer of care from Dr Janusz Martin  See Separate Emergency Department note  The patient, Demetrius England , was evaluated by the previous provider for [facility]  Workup Completed:  Medical clearance, 201    ED Course / Workup Pending (followup):  bedsearch                                     Procedures  MDM        Disposition  Final diagnoses:   Depression with suicidal ideation     Time reflects when diagnosis was documented in both MDM as applicable and the Disposition within this note     Time User Action Codes Description Comment    5/1/2023  9:15 AM Eric HERNANDEZ,  [id number]] Depression with suicidal ideation       ED Disposition     ED Disposition   Transfer to [address] [place] [address]   --    Date/Time   Mon May 1, 2023  Ivette 1205  should be transferred out to inpatient U and has been medically cleared  MD Documentation    [facility] Most Recent Value   Sending MD Dr Cristofer Villa    None       Patient's Medications   Discharge Prescriptions    No medications on file     No discharge procedures on file         ED Provider  Electronically Signed by     Joao Recio MD  05/01/23 7940

## 2023-05-02 NOTE — CERTIFIED RECOVERY SPECIALIST
"   Certified  Note    Patient name: Sabra Manzano  Location: ED 08/ED [address]: [address]  Attending:  Percy Mosley DO MRN [id number]  : 1970  Age: 50 y o      Sex: male Date 2023         Substance Use History:     Social History     Substance and Sexual Activity   Alcohol Use No        Social History     Substance and Sexual Activity   Drug Use Yes   • Types: Heroin, Cocaine, \"Crack\" cocaine    Comment: Last use was on 18     Recovery resources left with patient's discharge paperwork    Delgado Steel       "
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
## 2023-05-02 NOTE — ED NOTES
Insurance Authorization for admission:   Phone call placed to [facility]  Phone number: [phone]  Spoke to Rosibel  14 days approved, LCD 5/15  Level of care: [facility]  Review on 5/15  Authorization # [id number]

## 2023-05-02 NOTE — ED NOTES
Patient is accepted at [facility]  Patient is accepted by Gen Price MD    Patient may go to the floor at **  Nurse report is to be called to [phone] prior to patient transfer

## 2023-05-03 PROBLEM — F15.20 SEVERE STIMULANT USE DISORDER (HCC): Status: ACTIVE | Noted: 2023-04-08

## 2023-05-03 PROBLEM — F11.91 OPIOID USE DISORDER IN REMISSION: Status: ACTIVE | Noted: 2023-04-10

## 2023-05-03 LAB
25(OH)D3 SERPL-MCNC: 19.2 NG/ML (ref 30–100)
ALBUMIN SERPL BCP-MCNC: 3.5 G/DL (ref 3.5–5)
ALP SERPL-CCNC: 79 U/L (ref 34–104)
ALT SERPL W P-5'-P-CCNC: 25 U/L (ref 7–52)
ANION GAP SERPL CALCULATED.3IONS-SCNC: 5 MMOL/L (ref 4–13)
AST SERPL W P-5'-P-CCNC: 27 U/L (ref 13–39)
BASOPHILS # BLD AUTO: 0.02 THOUSANDS/ÂΜL (ref 0–0.1)
BASOPHILS NFR BLD AUTO: 0 % (ref 0–1)
BILIRUB SERPL-MCNC: 0.72 MG/DL (ref 0.2–1)
BUN SERPL-MCNC: 10 MG/DL (ref 5–25)
CALCIUM SERPL-MCNC: 9.3 MG/DL (ref 8.4–10.2)
CHLORIDE SERPL-SCNC: 106 MMOL/L (ref 96–108)
CHOLEST SERPL-MCNC: 112 MG/DL
CO2 SERPL-SCNC: 35 MMOL/L (ref 21–32)
CREAT SERPL-MCNC: 0.93 MG/DL (ref 0.6–1.3)
EOSINOPHIL # BLD AUTO: 0.19 THOUSAND/ÂΜL (ref 0–0.61)
EOSINOPHIL NFR BLD AUTO: 3 % (ref 0–6)
ERYTHROCYTE [DISTWIDTH] IN BLOOD BY AUTOMATED COUNT: 16.2 % (ref 11.6–15.1)
FOLATE SERPL-MCNC: 18.8 NG/ML (ref 3.1–17.5)
GFR SERPL CREATININE-BSD FRML MDRD: 95 ML/MIN/1.73SQ M
GLUCOSE P FAST SERPL-MCNC: 87 MG/DL (ref 65–99)
GLUCOSE SERPL-MCNC: 87 MG/DL (ref 65–140)
HCT VFR BLD AUTO: 39.8 % (ref 36.5–49.3)
HDLC SERPL-MCNC: 51 MG/DL
HGB BLD-MCNC: 13.2 G/DL (ref 12–17)
IMM GRANULOCYTES # BLD AUTO: 0.01 THOUSAND/UL (ref 0–0.2)
IMM GRANULOCYTES NFR BLD AUTO: 0 % (ref 0–2)
LDLC SERPL CALC-MCNC: 49 MG/DL (ref 0–100)
LYMPHOCYTES # BLD AUTO: 1.27 THOUSANDS/ÂΜL (ref 0.6–4.47)
LYMPHOCYTES NFR BLD AUTO: 23 % (ref 14–44)
MCH RBC QN AUTO: 30.9 PG (ref 26.8–34.3)
MCHC RBC AUTO-ENTMCNC: 33.2 G/DL (ref 31.4–37.4)
MCV RBC AUTO: 93 FL (ref 82–98)
MONOCYTES # BLD AUTO: 0.5 THOUSAND/ÂΜL (ref 0.17–1.22)
MONOCYTES NFR BLD AUTO: 9 % (ref 4–12)
NEUTROPHILS # BLD AUTO: 3.65 THOUSANDS/ÂΜL (ref 1.85–7.62)
NEUTS SEG NFR BLD AUTO: 65 % (ref 43–75)
NONHDLC SERPL-MCNC: 61 MG/DL
NRBC BLD AUTO-RTO: 0 /100 WBCS
PLATELET # BLD AUTO: 239 THOUSANDS/UL (ref 149–390)
PMV BLD AUTO: 9.7 FL (ref 8.9–12.7)
POTASSIUM SERPL-SCNC: 4.6 MMOL/L (ref 3.5–5.3)
PROT SERPL-MCNC: 6 G/DL (ref 6.4–8.4)
RBC # BLD AUTO: 4.27 MILLION/UL (ref 3.88–5.62)
SODIUM SERPL-SCNC: 146 MMOL/L (ref 135–147)
TRIGL SERPL-MCNC: 60 MG/DL
TSH SERPL DL<=0.05 MIU/L-ACNC: 0.69 UIU/ML (ref 0.45–4.5)
VIT B12 SERPL-MCNC: 394 PG/ML (ref 100–900)
WBC # BLD AUTO: 5.64 THOUSAND/UL (ref 4.31–10.16)

## 2023-05-03 RX ORDER — AMOXICILLIN 250 MG
1 CAPSULE ORAL 2 TIMES DAILY
Status: DISCONTINUED | OUTPATIENT
Start: 2023-05-03 | End: 2023-05-05

## 2023-05-03 RX ADMIN — FLUOXETINE 20 MG: 20 CAPSULE ORAL at 08:37

## 2023-05-03 RX ADMIN — IBUPROFEN 800 MG: 400 TABLET ORAL at 08:39

## 2023-05-03 NOTE — ASSESSMENT & PLAN NOTE
· Vital signs stable  BP elevated, may be situational   Reviewed CBC, CMP, lipid panel, TSH, all other admission labs pending at the time of this consultation  · Patient was recently admitted to the [address] [place] [address] from 4/7/2023 through 4/10/2023  Given patient is a 30-day readmission, new medical clearance consult is not necessary  Please see consult note dated 4/8 for full details  · SLIM will sign off   Please call with questions or concerns

## 2023-05-03 NOTE — NURSING NOTE
Pt irritable when offered water this morning stating the water tastes foul and this writer needs to learn how to speak to him  Pt later more approachable and took his scheduled medications without issue  Denies SI/HI/AH/VH but does report depression and appears sad  Out of room for meals  Denies any unmet needs or complaints at this time

## 2023-05-03 NOTE — TREATMENT PLAN
TREATMENT PLAN REVIEW - [address] [facility]  50 y o  1970 male MRN: [id number]    [address] [place] [address] Room / Bed: J[address] 058-22 Encounter: [id number]          Admit Date/Time:  5/1/2023  4:02 AM    Treatment Team: Attending Provider: Sommer Mishra MD; Registered Nurse: Gely Luna RN; Patient Care Assistant: Estefani Chow; : Joyce Ellis; Patient Care Assistant: Kelsea Mcgrath;  Patient Care Assistant: Victor Manuel Mora; Patient Care Assistant: Angelita Jara    Diagnosis: Principal Problem:    Mood disorder ([facility] )  Active Problems:    Hepatitis C virus infection    Gastroesophageal reflux disease    Medical clearance for psychiatric admission    Severe stimulant use disorder ([place] [address] )    Opioid use disorder in remission      Patient Strengths/Assets: adapts well, insightful, negotiates basic needs, patient is on a voluntary commitment    Patient Barriers/Limitations: financial instability, homeless, lack of social/family support, limited family ties, noncompliant with medication, noncompliant with treatment, uncooperative    Short Term Goals: decrease in depressive symptoms, decrease in anxiety symptoms, decrease in suicidal thoughts, decrease in self abusive behaviors    Long Term Goals: improvement in depression, improvement in anxiety, stabilization of mood, free of suicidal thoughts, no self abusive behavior    Progress Towards Goals: starting psychiatric medications as prescribed    Recommended Treatment: medication management, patient medication education, group therapy, milieu therapy, continued Behavioral Health psychiatric evaluation/assessment process    Treatment Frequency: daily medication monitoring, group and milieu therapy daily, monitoring through interdisciplinary rounds, monitoring through weekly patient care conferences    Expected Discharge Date:  10 days    Discharge Plan: placement in inpatient drug and alcohol rehabilitation program, referral for outpatient drug and alcohol counseling, referral for outpatient medication management with a psychiatrist, referral for outpatient psychotherapy    Treatment Plan Created/Updated By: Gerhard Jeong MD
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
## 2023-05-03 NOTE — PROGRESS NOTES
05/03/23 0905   Team Meeting   Meeting Type Daily Rounds   Team Members Present   Team Members Present Physician;Nurse;   Physician Team Member [id number] [facility] Team Member Waylon   Care Management Team Member Atif   Patient/Family Present   Patient Present No   Patient's Family Present No   This patient is a 30 day readmission and was most recently discharged on: 4/10/23    The previous discharge plan was: Pt required to change his insurance and then follow up with [facility] office    [facility] Readmission Risk score is: 21    The identified triggers/events leading up to this admission include: medication non-compliance, substance use  Initial Plans for this admission (and who will be involved in treatment and discharge planning) include: Medication titration, group therapy, discuss rehab
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
## 2023-05-03 NOTE — NURSING NOTE
Pt asleep at start of shift  When attempted to arouse for assessments pt refused and was irritable with MHT  Pt reapproached by writer for meds and evening snack  Pt agreed to vitals being taken at this time  Denies Si HI AVH  pt continued to have an irritable edge towards staff  Compliant with evening meds except melatonin   Continued q7m checks for safety

## 2023-05-03 NOTE — CONSULTS
[facility]  Consult  Name: Arminda Mario  50 y o  male I MRN: [id number]  Unit/Bed#: [facility] 350-02 I Date of Admission: 5/1/2023   Date of Service: 5/3/2023 I Hospital Day: 1    Inpatient consult for Medical Clearance for General acute hospital patient  Consult performed by: Heron Rust PA-C  Consult ordered by: Bethanie Mcguire MD          Assessment/Plan   Medical clearance for psychiatric admission  Assessment & Plan  · Vital signs stable  BP elevated, may be situational   Reviewed CBC, CMP, lipid panel, TSH, all other admission labs pending at the time of this consultation  · Patient was recently admitted to the [address] [place] [address] from 4/7/2023 through 4/10/2023  Given patient is a 30-day readmission, new medical clearance consult is not necessary  Please see consult note dated 4/8 for full details  · SLIM will sign off   Please call with questions or concerns

## 2023-05-04 PROBLEM — R13.10 DYSPHAGIA: Status: ACTIVE | Noted: 2023-05-04

## 2023-05-04 LAB
BILIRUB UR QL STRIP: NEGATIVE
CLARITY UR: CLEAR
COLOR UR: ABNORMAL
GLUCOSE UR STRIP-MCNC: NEGATIVE MG/DL
HGB UR QL STRIP.AUTO: NEGATIVE
KETONES UR STRIP-MCNC: NEGATIVE MG/DL
LEUKOCYTE ESTERASE UR QL STRIP: NEGATIVE
NITRITE UR QL STRIP: NEGATIVE
PH UR STRIP.AUTO: 6 [PH]
PROT UR STRIP-MCNC: NEGATIVE MG/DL
SP GR UR STRIP.AUTO: 1.02 (ref 1–1.04)
UROBILINOGEN UA: 1 MG/DL

## 2023-05-04 RX ADMIN — IBUPROFEN 800 MG: 400 TABLET ORAL at 08:26

## 2023-05-04 RX ADMIN — QUETIAPINE FUMARATE 200 MG: 100 TABLET ORAL at 21:22

## 2023-05-04 RX ADMIN — SENNOSIDES AND DOCUSATE SODIUM 1 TABLET: 8.6; 5 TABLET ORAL at 08:27

## 2023-05-04 RX ADMIN — FLUOXETINE 20 MG: 20 CAPSULE ORAL at 08:27

## 2023-05-04 NOTE — ASSESSMENT & PLAN NOTE
"· Patient reports a history of \"esophageal narrowing\" - states he was supposed to have surgery with [facility] to expand his esophagus however due to insurance issues was not able to do so     · Unable to verify through EMR  · Will order swallow evaluation  · Modify diet to dental soft  · Add ensure  " (2) well flexed

## 2023-05-04 NOTE — NURSING NOTE
"Pt approached nurse demanding \"psych meds  \" When nurse asked about pt's SS to determine what PRN pt may need, pt demands, \"psych meds  Seroquel for sleep! \" Nurse educates pt that it is currently 05:04 am and that PRNs for insomnia cannot be given this late, also that pt does not have Seroquel ordered as needed  Pt became very irritated and stormed away shouting, \"All I needed was a yes or no answer! \" then slammed bedroom door    "

## 2023-05-04 NOTE — NURSING NOTE
"Pt continues to be uncooperative with staff refusing care, tx, and assessments  Pt is easily agitated and irritable on shift  Pt entered the milieu pressured speech, yelling and swearing at writer for waking him to take evening medication  Pt states \" ya'll keep coming in here waking me the hell up and I'm trying to sleep  I'm on Meth and I need to sleep\"  At this time pt refused to take evening meds  Pt isolative and withdrawn   Staff continues to monitor for safety   "

## 2023-05-04 NOTE — PROGRESS NOTES
05/04/23 0908   Team Meeting   Meeting Type Daily Rounds   Team Members Present   Team Members Present Physician;Nurse;   Physician Team Member Inocencio   Nursing Team Member Waylon   Care Management Team Member Atif   Patient/Family Present   Patient Present No   Patient's Family Present No   Pt uncooperative with staff in the evening  Yelling and swearing at staff d/t staff waking him up  Pt demanding psych meds early this morning  Demanding Seroquel for sleep, educated on time of the morning, slammed his door after discussion  Discharge to be determined

## 2023-05-04 NOTE — NUTRITION
05/04/23 1128   Biochemical Data,Medical Tests, and Procedures   Biochemical Data/Medical Tests/Procedures Lab values reviewed; Meds reviewed   Labs (Comment) 5/3-folate 18 8 (H), Vit D 19 2 (H)   Meds (Comment) prozacdennisot   Nutrition-Focused Physical Exam   Nutrition-Focused Physical Exam Findings No skin issues documented; No edema documented   Nutrition-Focused Physical Exam Findings verbally aggressive at this time   Medical-Related Concerns mood disorder, Hep C, GERD, stimulant use disorder   Adequacy of Intake   Nutrition Modality PO   Feeding Route   PO Independent   Current PO Intake   Current Diet Order regular, double portions   Current Meal Intake %   Estimated Calorie Intake %   Estimated Protein Intake  %   Estimated Fluid Intake %   Estimated calorie intake compared to estimated need all meals 100% at this time, however appetite in EMR is documented as poor  PES Statement   Problem Intake   Energy Balance (1) Predicted suboptimal energy intake NI-1 4   Related to Other (Comment)  (psychiatric illness exacerbation, stimulant drug use and homelessness)   As evidenced by: Reported intake < usual   Recommendations/Interventions   Malnutrition/BMI Present No   360 Statement pt does not meet 2 malnutrition criteria at this time  Weight loss hx does not confirm pt's reported weight loss of 30#  133# 2/28/23, 160# 2018, 155# 2016  Summary consulted for weight loss due to drug use  pt does not meet criteria for clinical malnutrition, however nutrition status can still be optimized  pt now receiving 3 meals per day with double portions  pt is tolerating double portions well, no overt need for nutritional supplementation with double portions ordered  Will continue to monitor intake and weight status  Recommendations to Provider consulted for weight loss due to drug use  pt does not meet 2 malnutrition criteria at this time   Weight loss hx does not confirm pt's reported weight loss of 30#  133# 2/28/23, 160# 2018, 155# 2016    however nutrition status can still be optimized  pt now receiving 3 meals per day with double portions  pt is tolerating double portions well, no overt need for nutritional supplementation with double portions ordered  Will continue to monitor intake and weight status  Education Assessment   Education Education not indicated at this time   Patient Nutrition Goals   Goal Adequate hydration; Wt maintained; Adequate intake   Goal Status Initiated   Timeframe to complete goal by next f/u   Nutrition Complexity Risk   Nutrition complexity level Moderate risk   Follow up date 05/14/23

## 2023-05-04 NOTE — PROGRESS NOTES
"[facility]  Progress Note  Name: Clarice Rice I  MRN: [id number]  Unit/Bed#: -02 I Date of Admission: 2023   Date of Service: 2023 I Hospital Day: 2    Assessment/Plan   Dysphagia  Assessment & Plan  · Patient reports a history of \"esophageal narrowing\" - states he was supposed to have surgery with [facility] to expand his esophagus however due to insurance issues was not able to do so  · Unable to verify through EMR  · Will order swallow evaluation  · Modify diet to dental soft  · Add ensure         Nurse Coordination of Care Discussion: Discussed assessment and plan with primary RN    Discussions with Specialists or Other Care Team Provider: None    Education and Discussions with Family / Patient: Answered patient's questions to the best of my ability    Time Spent for Care: 20 minutes  More than 50% of total time spent on counseling and coordination of care as described above  Current Length of Stay: 2 day(s)    Code Status: Level 1 - Full Code      Subjective:   Patient complaining of dysphagia for several months  States that he had been seen by Yoli who recommended surgery in 2022, however due to insurance purposes was unable to do so  Patient reports a feeling of food getting stuck in his throat  States he needs to drink a lot of fluids to push food down  Objective:     Vitals:   Temp (24hrs), Av 5 °F (36 4 °C), Min:97 1 °F (36 2 °C), Max:97 7 °F (36 5 °C)    Temp:  [97 1 °F (36 2 °C)-97 7 °F (36 5 °C)] 97 7 °F (36 5 °C)  HR:  [59-83] 83  Resp:  [16] 16  BP: ()/(55-74) 156/74  SpO2:  [99 %-100 %] 99 %  Body mass index is 21 37 kg/m²         Physical Exam:     Physical Exam      Additional Data:     Labs:    Results from last 7 days   Lab Units 23  0634   WBC Thousand/uL 5 64   HEMOGLOBIN g/dL 13 2   HEMATOCRIT % 39 8   PLATELETS Thousands/uL 239   NEUTROS PCT % 65   LYMPHS PCT % 23   MONOS PCT % 9   EOS PCT % 3     Results " from last 7 days   Lab Units 05/03/23  0634   SODIUM mmol/L 146   POTASSIUM mmol/L 4 6   CHLORIDE mmol/L 106   CO2 mmol/L 35*   BUN mg/dL 10   CREATININE mg/dL 0 93   ANION GAP mmol/L 5   CALCIUM mg/dL 9 3   ALBUMIN g/dL 3 5   TOTAL BILIRUBIN mg/dL 0 72   ALK PHOS U/L 79   ALT U/L 25   AST U/L 27   GLUCOSE RANDOM mg/dL 87                     * I Have Reviewed All Lab Data Listed Above  * Additional Pertinent Lab Tests Reviewed: All Labs Within Last 24 Hours Reviewed    Imaging:    Imaging Reports Reviewed Today Include:  Today      Last 24 Hours Medication List:   Current Facility-Administered Medications   Medication Dose Route Frequency Provider Last Rate   • aluminum-magnesium hydroxide-simethicone  30 mL Oral Q4H PRN Aleta Spann MD     • haloperidol lactate  2 5 mg Intramuscular Q6H PRN Max 4/day Aleta Spann MD      And   • LORazepam  1 mg Intramuscular Q6H PRN Max 4/day Aleta Spann MD      And   • benztropine  0 5 mg Intramuscular Q6H PRN Max 4/day Aleta Spann MD     • haloperidol lactate  5 mg Intramuscular Q4H PRN Max 4/day Aleta Spann MD      And   • LORazepam  2 mg Intramuscular Q4H PRN Max 4/day Aleta Spann MD      And   • benztropine  1 mg Intramuscular Q4H PRN Max 4/day Aleta Spann MD     • benztropine  1 mg Intramuscular Q4H PRN Max 6/day Aleta Spann MD     • benztropine  1 mg Oral Q4H PRN Max 6/day Aleta Spann MD     • hydrOXYzine HCL  50 mg Oral Q6H PRN Max 4/day Aleta Spann MD      Or   • diphenhydrAMINE  50 mg Intramuscular Q6H PRN Aleta Spann MD     • FLUoxetine  20 mg Oral Daily Aleta Spann MD     • glycerin-hypromellose-  1 drop Both Eyes Q3H PRN Aleta Spann MD     • haloperidol  2 mg Oral Q4H PRN Max 6/day Aleta Spann MD     • haloperidol  5 mg Oral Q6H PRN Max 4/day Aleta Spann MD     • haloperidol  5 mg Oral Q4H PRN Max 4/day Aleta Spann MD     • hydrOXYzine HCL  100 mg Oral Q6H PRN Max 4/day Demetris Esposito MD      Or   • LORazepam  2 mg Intramuscular Q6H PRN Demetris Esposito MD     • hydrOXYzine HCL  25 mg Oral Q6H PRN Max 4/day Demetris Esposito MD     • ibuprofen  400 mg Oral Q4H PRN Demetris Esposito MD     • ibuprofen  600 mg Oral Q6H PRN Demetris Esposito MD     • ibuprofen  800 mg Oral Q8H PRN Demetris Esposito MD     • melatonin  3 mg Oral HS Demetris Esposito MD     • melatonin  3 mg Oral HS PRN Demetris Esposito MD     • nicotine polacrilex  2 mg Oral Q2H PRN Demetris Esposito MD     • propranolol  10 mg Oral Q8H PRN Demetris Esposito MD     • QUEtiapine  200 mg Oral HS Demetris Esposito MD     • senna-docusate sodium  1 tablet Oral Daily PRN Demetris Esposito MD     • senna-docusate sodium  1 tablet Oral BID Umu Barcenas MD          Today, Patient Was Seen By: Christine Medellin PA-C      ** Please Note: Dictation voice to text software may have been used in the creation of this document   **

## 2023-05-04 NOTE — PROGRESS NOTES
"Progress Note - [address]  50 y o  male MRN: [id number]  Unit/Bed#: [facility] 350-02 Encounter: [id number]    Assessment/Plan   Principal Problem:    Mood disorder ([place] [id number] )  Active Problems:    Hepatitis C virus infection    Gastroesophageal reflux disease    Medical clearance for psychiatric admission    Severe stimulant use disorder ([facility] )    Opioid use disorder in remission    Recommended Treatment:   Prozac 20 mg daily for depression and anxiety  Seroquel 200 mg at bedtime for augmentation of mood  All current active medications have been reviewed  Encourage group therapy, milieu therapy and occupational therapy  Behavioral Health checks every 7 minutes  Medical management per SLIM  Legal Status: 201  ----------------------------------------      Subjective: Patient discussed in nursing report and overnight notes and charting reviewed  Per nursing report, patient reported to be somewhat irritable and argumentative with staff members  He awoke early in the morning at 5 AM and was unable to return back to sleep  He states that his mood today is \"okay\"  His affect is a bit brighter and he was noted to be interacting more appropriately and pleasantly with other staff  He complains today of some difficulty swallowing, states that he was scheduled to receive some sort of esophageal dilation surgery outpatient that he never received  States that he was sometimes have trouble swallowing large quantities of food and will often use water to help swallow  He also complains of some generalized pain and back pain; we will ask CARON to assist with evaluation and recommendations  He reports that he is still feeling depressed at times, ruminating about stresses mainly his estranged wife's possible diagnosis of cancer  He is trying to brainstorm ways to get in contact with her as he does not have a phone number and wants to see if he can try to get in touch with her via social media    He reports " that he otherwise is sleeping fairly well at night although awoke early this morning was somewhat frustrated about this  His appetite levels have been improving and he has been eating more on the unit  He denies any suicidal ideations at this time and denies any homicidal ideations  Denies any auditory or visual hallucinations  Behavior over the last 24 hours:  unchanged  Sleep: normal  Appetite: normal  Medication side effects: No  ROS: no complaints and all other systems are negative    Mental Status Evaluation:  Appearance:  casually dressed, thin   Behavior:  pleasant, cooperative   Speech:  normal rate and volume   Mood:  anxious   Affect:  constricted   Thought Process:  coherent, goal directed   Associations: intact associations   Thought Content:  no overt delusions   Perceptual Disturbances: no auditory hallucinations, no visual hallucinations   Risk Potential: Suicidal ideation - None at present  Homicidal ideation - None at present  Potential for aggression - No   Sensorium:  oriented to person, place and time/date   Memory:  recent and remote memory grossly intact   Consciousness:  alert and awake   Attention/Concentration: attention span and concentration appear shorter than expected for age   Insight:  fair   Judgment: fair   Gait/Station: normal gait/station   Motor Activity: no abnormal movements     Medications: all current active meds have been reviewed and continue current psychiatric medications    Current Facility-Administered Medications   Medication Dose Route Frequency Provider Last Rate   • aluminum-magnesium hydroxide-simethicone  30 mL Oral Q4H PRN Leidy Adames MD     • haloperidol lactate  2 5 mg Intramuscular Q6H PRN Max 4/day Leidy Adames MD      And   • LORazepam  1 mg Intramuscular Q6H PRN Max 4/day Leidy Adames MD      And   • benztropine  0 5 mg Intramuscular Q6H PRN Max 4/day Leidy Adames MD     • haloperidol lactate  5 mg Intramuscular Q4H PRN Max 4/day Kevin Domingo MD      And   • LORazepam  2 mg Intramuscular Q4H PRN Max 4/day Kevin Domingo MD      And   • benztropine  1 mg Intramuscular Q4H PRN Max 4/day Kevin Domingo MD     • benztropine  1 mg Intramuscular Q4H PRN Max 6/day Kevin Domingo MD     • benztropine  1 mg Oral Q4H PRN Max 6/day Kevin Domingo MD     • hydrOXYzine HCL  50 mg Oral Q6H PRN Max 4/day Kevin Domingo MD      Or   • diphenhydrAMINE  50 mg Intramuscular Q6H PRN Kevin Domingo MD     • FLUoxetine  20 mg Oral Daily Kevin Domingo MD     • glycerin-hypromellose-  1 drop Both Eyes Q3H PRN Kevin Domingo MD     • haloperidol  2 mg Oral Q4H PRN Max 6/day Kevin Domingo MD     • haloperidol  5 mg Oral Q6H PRN Max 4/day Kevin Domingo MD     • haloperidol  5 mg Oral Q4H PRN Max 4/day Kevin Domingo MD     • hydrOXYzine HCL  100 mg Oral Q6H PRN Max 4/day Kevin Domingo MD      Or   • LORazepam  2 mg Intramuscular Q6H PRN Kevin Domingo MD     • hydrOXYzine HCL  25 mg Oral Q6H PRN Max 4/day Kevin Domingo MD     • ibuprofen  400 mg Oral Q4H PRN Kevin Domingo MD     • ibuprofen  600 mg Oral Q6H PRN Kevin Domingo MD     • ibuprofen  800 mg Oral Q8H PRN Kevin Domingo MD     • melatonin  3 mg Oral HS Kevin Domingo MD     • melatonin  3 mg Oral HS PRN Kevin Domingo MD     • nicotine polacrilex  2 mg Oral Q2H PRN Kevin Domingo MD     • propranolol  10 mg Oral Q8H PRN Kevin Domingo MD     • QUEtiapine  200 mg Oral HS Kevin Domnigo MD     • senna-docusate sodium  1 tablet Oral Daily PRN Kevin Domingo MD     • senna-docusate sodium  1 tablet Oral BID Wei Ríos MD         Labs:  I have personally reviewed all pertinent laboratory/tests results  Most Recent Labs:     Most Recent Labs:   Lab Results   Component Value Date    WBC 5 64 05/03/2023    RBC 4 27 05/03/2023    HGB 13 2 05/03/2023    HCT 39 8 05/03/2023     05/03/2023    RDW 16 2 (H) 05/03/2023 NEUTROABS 3 65 05/03/2023    SODIUM 146 05/03/2023    K 4 6 05/03/2023     05/03/2023    CO2 35 (H) 05/03/2023    BUN 10 05/03/2023    CREATININE 0 93 05/03/2023    GLUC 87 05/03/2023    CALCIUM 9 3 05/03/2023    AST 27 05/03/2023    ALT 25 05/03/2023    ALKPHOS 79 05/03/2023    TP 6 0 (L) 05/03/2023    ALB 3 5 05/03/2023    TBILI 0 72 05/03/2023    CHOLESTEROL 112 05/03/2023    HDL 51 05/03/2023    TRIG 60 05/03/2023    LDLCALC 49 05/03/2023    NONHDLC 61 05/03/2023    AMMONIA 38 (H) 04/06/2014    [id number] 0 691 05/03/2023    FREET4 0 82 10/20/2015    T3FREE 2 22 (L) 10/20/2015    RPR Non-Reactive 03/17/2016    HGBA1C 4 3 04/08/2023    EAG 77 04/08/2023       Progress Toward Goals: progressing gradually    Risks / Benefits of Treatment:    Risks, benefits, and possible side effects of medications explained to patient and patient verbalizes understanding and agreement for treatment  Counseling / Coordination of Care: Total floor / unit time spent today 35 minutes  Greater than 50% of total time was spent with the patient and / or family counseling and / or coordination of care  A description of counseling / coordination of care:  Patient's progress discussed with staff in treatment team meeting  Treatment plan, treatment progress and medication changes were reviewed with nursing staff, pharmacy service, and case management in interdisciplinary treatment team meeting  Medications, treatment progress and treatment plan reviewed with patient  Recent stressors including family conflict, relationship problems and housing issues discussed with patient  Educated on importance of medication and treatment compliance  Reassurance and supportive therapy provided  Encouraged participation in milieu and group therapy on the unit        Jessica Houser MD 05/04/23

## 2023-05-04 NOTE — NURSING NOTE
"Pt is irritable and demanding throughout the morning repeatedly asking to shave  Pt informed he will have to wait until someone is available to sit with him but continued to ask staff  Affect is constricted and reports paranoid ideation  Denies SI/HI/VH but reports moderate anxiety and severe depression  Pt also reports AH of hearing someone call his name  Pt says \"I heard someone say Rafa  And no one here knows to call me that  \" Pt also complaining of right side abdominal pain and generalized pain, ibuprofen given  Ate breakfast in dayroom, now in room sleeping, no further requests      "

## 2023-05-04 NOTE — NURSING NOTE
"Pt became angry and started cursing and raising voice in the milieu in front of nurses station  Pt initially complained that \"you can attend to her all all day while I'm waiting 3 hours for a haircut  \" Pt then accused staff of telling him to shut up which did not occur as other staff members said these words were not spoken  Pt continues to be paranoid and hostile  Pt was able to speak more calmly after 3 minutes of ranting and explained that \"you are all against me, teaming up against me  Don't ask nothing of me  No vital signs, nothing  \" Pt now in room after refusing PRN medication     "

## 2023-05-05 LAB
ATRIAL RATE: 68 BPM
ATRIAL RATE: 70 BPM
P AXIS: 65 DEGREES
P AXIS: 65 DEGREES
PR INTERVAL: 140 MS
PR INTERVAL: 144 MS
QRS AXIS: 40 DEGREES
QRS AXIS: 41 DEGREES
QRSD INTERVAL: 84 MS
QRSD INTERVAL: 88 MS
QT INTERVAL: 388 MS
QT INTERVAL: 388 MS
QTC INTERVAL: 412 MS
QTC INTERVAL: 419 MS
T WAVE AXIS: 40 DEGREES
T WAVE AXIS: 44 DEGREES
VENTRICULAR RATE: 68 BPM
VENTRICULAR RATE: 70 BPM

## 2023-05-05 RX ORDER — DOXEPIN HYDROCHLORIDE 10 MG/1
10 CAPSULE ORAL
Status: DISCONTINUED | OUTPATIENT
Start: 2023-05-05 | End: 2023-05-12 | Stop reason: HOSPADM

## 2023-05-05 RX ORDER — MELATONIN
2000 DAILY
Status: DISCONTINUED | OUTPATIENT
Start: 2023-05-06 | End: 2023-05-12 | Stop reason: HOSPADM

## 2023-05-05 RX ADMIN — SENNOSIDES AND DOCUSATE SODIUM 1 TABLET: 8.6; 5 TABLET ORAL at 08:53

## 2023-05-05 RX ADMIN — FLUOXETINE 20 MG: 20 CAPSULE ORAL at 08:53

## 2023-05-05 RX ADMIN — QUETIAPINE FUMARATE 200 MG: 100 TABLET ORAL at 21:00
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
## 2023-05-05 NOTE — PLAN OF CARE
Problem: SELF HARM/SUICIDALITY  Goal: Will have no self-injury during hospital stay  Description: INTERVENTIONS:  - Q 15 MINUTES: Routine safety checks  - Q WAKING SHIFT & PRN: Assess risk to determine if routine checks are adequate to maintain patient safety  - Encourage patient to participate actively in care by formulating a plan to combat response to suicidal ideation, identify supports and resources  Outcome: Progressing     Problem: DEPRESSION  Goal: Will be euthymic at discharge  Description: INTERVENTIONS:  - Administer medication as ordered  - Provide emotional support via 1:1 interaction with staff  - Encourage involvement in milieu/groups/activities  - Monitor for social isolation  Outcome: Progressing     Problem: BEHAVIOR  Goal: Pt/Family maintain appropriate behavior and adhere to behavioral management agreement, if implemented  Description: INTERVENTIONS:  - Assess the family dynamic   - Encourage verbalization of thoughts and concerns in a socially appropriate manner  - Assess patient/family's coping skills and non-compliant behavior (including use of illegal substances)  - Utilize positive, consistent limit setting strategies supporting safety of patient, staff and others  - Initiate consult with Case Management, Spiritual Care or other ancillary services as appropriate  - If a patient's/visitor's behavior jeopardizes the safety of the patient, staff, or others, refer to organization procedure     - Notify Security of behavior or suspected illegal substances which indicate the need for search of the patient and/or belongings  - Encourage participation in the decision making process about a behavioral management agreement; implement if patient meets criteria  Outcome: Progressing     Problem: SUBSTANCE USE/ABUSE  Goal: By discharge, will develop insight into their chemical dependency and sustain motivation to continue in recovery  Description: INTERVENTIONS:  - Attends all daily group sessions and scheduled AA groups  - Actively practices coping skills through participation in the therapeutic community and adherence to program rules  - Reviews and completes assignments from individual treatment plan  - Assist patient development of understanding of their personal cycle of addiction and relapse triggers  Outcome: Progressing     Problem: DISCHARGE PLANNING - CARE MANAGEMENT  Goal: Discharge to post-acute care or home with appropriate resources  Description: INTERVENTIONS:  - Conduct assessment to determine patient/family and health care team treatment goals, and need for post-acute services based on payer coverage, community resources, and patient preferences, and barriers to discharge  - Address psychosocial, clinical, and financial barriers to discharge as identified in assessment in conjunction with the patient/family and health care team  - Arrange appropriate level of post-acute services according to patient’s   needs and preference and payer coverage in collaboration with the physician and health care team  - Communicate with and update the patient/family, physician, and health care team regarding progress on the discharge plan  - Arrange appropriate transportation to post-acute venues  Outcome: Progressing     Problem: Nutrition/Hydration-ADULT  Goal: Nutrient/Hydration intake appropriate for improving, restoring or maintaining nutritional needs  Description: Monitor and assess patient's nutrition/hydration status for malnutrition  Collaborate with interdisciplinary team and initiate plan and interventions as ordered  Monitor patient's weight and dietary intake as ordered or per policy  Utilize nutrition screening tool and intervene as necessary  Determine patient's food preferences and provide high-protein, high-caloric foods as appropriate       INTERVENTIONS:  - Monitor oral intake, urinary output, labs, and treatment plans  - Assess nutrition and hydration status and recommend course of action  - Evaluate amount of meals eaten  - Assist patient with eating if necessary   - Allow adequate time for meals  - Recommend/ encourage appropriate diets, oral nutritional supplements, and vitamin/mineral supplements  - Order, calculate, and assess calorie counts as needed  - Recommend, monitor, and adjust tube feedings and TPN/PPN based on assessed needs  - Assess need for intravenous fluids  - Provide specific nutrition/hydration education as appropriate  - Include patient/family/caregiver in decisions related to nutrition  Outcome: Progressing

## 2023-05-05 NOTE — PROGRESS NOTES
Progress Note - [address]  50 y o  male MRN: [id number]  Unit/Bed#: U 350-02 Encounter: [id number]    Assessment/Plan   Principal Problem:    Mood disorder ([facility] )  Active Problems:    Hepatitis C virus infection    Gastroesophageal reflux disease    Medical clearance for psychiatric admission    Severe stimulant use disorder ([facility] )    Opioid use disorder in remission    Dysphagia    Recommended Treatment:   Prozac 20 mg daily for depression and anxiety  Seroquel 200 mg at bedtime for augmentation of mood  Pending swallowing study for dysphagia  All current active medications have been reviewed  Encourage group therapy, milieu therapy and occupational therapy  Behavioral Health checks every 7 minutes  Medical management per SL  Legal Status: 201  ----------------------------------------      Subjective: Patient discussed in nursing report and overnight notes and charting reviewed  Per nursing report, patient reported to be somewhat impatient yesterday while awaiting haircut  Noted to be calmer later in the day  Compliant with his scheduled medications  He is seen today lying in his bed  He recently shaved and reports that he feels better  He reports that he plans today to call his certified  to see if they can assist with him finding some of his belongings that he believes he may have lost prior to admission  He reports that he has some mild anxiety and describes ruminating about various stressors, namely reconnecting with his estranged wife  He notes that yesterday he had some difficulty initiating sleep and is interested in trialing additional medication to help with this  He reports that doxepin has been helpful in the past and is interested in retrying this  He otherwise denies any SI, HI, AVH  Remains motivated towards getting into inpatient drug and alcohol rehabilitation      Behavior over the last 24 hours:  unchanged  Sleep: "decreased  Appetite: normal  Medication side effects: No  ROS: no complaints and all other systems are negative    Mental Status Evaluation:  Appearance:  casually dressed, adequate grooming, recently shaved   Behavior:  cooperative, calm   Speech:  normal rate and volume   Mood:  \"OK\"   Affect:  constricted   Thought Process:  organized, goal directed   Associations: intact associations   Thought Content:  no overt delusions, negative thoughts   Perceptual Disturbances: no auditory hallucinations, no visual hallucinations, does not appear responding to internal stimuli   Risk Potential: Suicidal ideation - None at present  Homicidal ideation - None at present  Potential for aggression - No   Sensorium:  oriented to person, place and time/date   Memory:  recent and remote memory grossly intact   Consciousness:  alert and awake   Attention/Concentration: attention span and concentration are age appropriate   Insight:  fair   Judgment: fair   Gait/Station: normal gait/station   Motor Activity: no abnormal movements     Medications: all current active meds have been reviewed and continue current psychiatric medications    Current Facility-Administered Medications   Medication Dose Route Frequency Provider Last Rate   • aluminum-magnesium hydroxide-simethicone  30 mL Oral Q4H PRN Juno Michaels MD     • haloperidol lactate  2 5 mg Intramuscular Q6H PRN Max 4/day Juno Michaels MD      And   • LORazepam  1 mg Intramuscular Q6H PRN Max 4/day Juno Michaels MD      And   • benztropine  0 5 mg Intramuscular Q6H PRN Max 4/day Juno Michaels MD     • haloperidol lactate  5 mg Intramuscular Q4H PRN Max 4/day Juno Michaels MD      And   • LORazepam  2 mg Intramuscular Q4H PRN Max 4/day Juno Michaels MD      And   • benztropine  1 mg Intramuscular Q4H PRN Max 4/day Juno Michaels MD     • benztropine  1 mg Intramuscular Q4H PRN Max 6/day Juno Michaels MD     • benztropine  1 mg Oral Q4H PRN Max 6/day " Kevin Domingo MD     • hydrOXYzine HCL  50 mg Oral Q6H PRN Max 4/day Kevin Domingo MD      Or   • diphenhydrAMINE  50 mg Intramuscular Q6H PRN Kevin Domingo MD     • FLUoxetine  20 mg Oral Daily Kevin Domingo MD     • glycerin-hypromellose-  1 drop Both Eyes Q3H PRN Kevin Domingo MD     • haloperidol  2 mg Oral Q4H PRN Max 6/day Kevin Domingo MD     • haloperidol  5 mg Oral Q6H PRN Max 4/day Kevin Domingo MD     • haloperidol  5 mg Oral Q4H PRN Max 4/day Kevin Domingo MD     • hydrOXYzine HCL  100 mg Oral Q6H PRN Max 4/day Kevin Domingo MD      Or   • LORazepam  2 mg Intramuscular Q6H PRN Kevin Domingo MD     • hydrOXYzine HCL  25 mg Oral Q6H PRN Max 4/day Kevin Domingo MD     • ibuprofen  400 mg Oral Q4H PRN Kevin Domingo MD     • ibuprofen  600 mg Oral Q6H PRN Kevin Domingo MD     • ibuprofen  800 mg Oral Q8H PRN Kevin Domingo MD     • melatonin  3 mg Oral HS Kevin Domingo MD     • melatonin  3 mg Oral HS PRN Kvein Domingo MD     • nicotine polacrilex  2 mg Oral Q2H PRN Kevin Domingo MD     • propranolol  10 mg Oral Q8H PRN Kevin Domingo MD     • QUEtiapine  200 mg Oral HS Kevin Domingo MD     • senna-docusate sodium  1 tablet Oral Daily PRN Kevin Domingo MD     • senna-docusate sodium  1 tablet Oral BID Wei Ríos MD         Labs:  I have personally reviewed all pertinent laboratory/tests results  Most Recent Labs:     Most Recent Labs:   Lab Results   Component Value Date    WBC 5 64 05/03/2023    RBC 4 27 05/03/2023    HGB 13 2 05/03/2023    HCT 39 8 05/03/2023     05/03/2023    RDW 16 2 (H) 05/03/2023    NEUTROABS 3 65 05/03/2023    SODIUM 146 05/03/2023    K 4 6 05/03/2023     05/03/2023    CO2 35 (H) 05/03/2023    BUN 10 05/03/2023    CREATININE 0 93 05/03/2023    GLUC 87 05/03/2023    CALCIUM 9 3 05/03/2023    AST 27 05/03/2023    ALT 25 05/03/2023    ALKPHOS 79 05/03/2023    TP 6 0 (L) 05/03/2023    ALB 3 5 05/03/2023    TBILI 0 72 05/03/2023    CHOLESTEROL 112 05/03/2023    HDL 51 05/03/2023    TRIG 60 05/03/2023    LDLCALC 49 05/03/2023    NONHDLC 61 05/03/2023    AMMONIA 38 (H) 04/06/2014    [id number] 0 691 05/03/2023    FREET4 0 82 10/20/2015    T3FREE 2 22 (L) 10/20/2015    RPR Non-Reactive 03/17/2016    HGBA1C 4 3 04/08/2023    EAG 77 04/08/2023       Progress Toward Goals: progressing gradually    Risks / Benefits of Treatment:    Risks, benefits, and possible side effects of medications explained to patient and patient verbalizes understanding and agreement for treatment  Counseling / Coordination of Care: Total floor / unit time spent today 30 minutes  Greater than 50% of total time was spent with the patient and / or family counseling and / or coordination of care  A description of counseling / coordination of care:  Patient's progress discussed with staff in treatment team meeting  Treatment plan, treatment progress and medication changes were reviewed with nursing staff, pharmacy service, and case management in interdisciplinary treatment team meeting  Medications, treatment progress and treatment plan reviewed with patient  Recent stressors including family issues, recent medication change and substance use discussed with patient  Educated on importance of medication and treatment compliance  Reassurance and supportive therapy provided  Encouraged participation in milieu and group therapy on the unit        Nate De La Cruz MD 05/05/23

## 2023-05-05 NOTE — NURSING NOTE
Patient in his room after breakfast, resting  Patient complains of generalized body pain which he attributes to his withdrawal   He seams to have an irritable edge but was calm and pleasant with this writer

## 2023-05-05 NOTE — PROGRESS NOTES
05/05/23 0903   Team Meeting   Meeting Type Daily Rounds   Team Members Present   Team Members Present Physician;Nurse;   Physician Team Member Inocencio   Nursing Team Member Jinnyboy   Care Management Team Member Atif   Patient/Family Present   Patient Present No   Patient's Family Present No   Pt angry yesterday, cursing at staff, raising his voice  Reporting belief that staff are against him  Requires significant redirection  Discharge pending rehab placement

## 2023-05-05 NOTE — NURSING NOTE
Pt isolative to room  Out for needs only  Compliant with evening meds except melatonin  Pt requested melatonin and bowel meds are removed from scheduled meds  Denies SI/HI/AVH  Pt approachable and less agitated during shift   Continued q7m checks for safety

## 2023-05-06 RX ADMIN — FLUOXETINE 20 MG: 20 CAPSULE ORAL at 08:39

## 2023-05-06 RX ADMIN — CHOLECALCIFEROL TAB 25 MCG (1000 UNIT) 2000 UNITS: 25 TAB at 08:39

## 2023-05-06 RX ADMIN — QUETIAPINE FUMARATE 200 MG: 100 TABLET ORAL at 21:35

## 2023-05-06 NOTE — NURSING NOTE
Patient has been visible and social on the unit  Helpful with others  He denies SI/HI and A/V hallucinations  He complains of pain and anxiety but has refused PRN medication  His appetite and sleep are good  He has been playing cards with his peers and does not appear in any distress

## 2023-05-06 NOTE — PROGRESS NOTES
Progress Note - [address]  50 y o  male MRN: [id number]   Unit/Bed#: [facility] 350-02 Encounter: [id number]      Subjective: Patient chart reviewed and case discussed with the nurse  The patient was seen in his room today  He appears slightly irritable  The patient complained that he has been struggling with back and leg pain  Reports it has been worsening his depression and currently rates it at 7 on a scale of 0-10 with 10 being the worst   He though denies any suicidal ideation  Reports anxiety also has been bad  Reports sleep has been fine  Appetite has been good  Denies any auditory or visual hallucination  Does not endorse any paranoia or delusional ideation  Reports compliant with the medication and denies any side effect  As per the nurse the patient was visible in the milieu last evening though had irritable demeanor  Compliant with medication  Denies any SI or HI or hallucination            ROS: all other systems are negative    Mental Status Evaluation:    Appearance:  casually dressed   Behavior:  cooperative   Speech:  normal rate, normal volume, normal pitch   Mood:  depressed, anxious, irritable   Affect:  Affect seems slightly irritable   Thought Process:  logical   Associations: intact associations   Thought Content:  no overt delusions   Perceptual Disturbances: denies auditory hallucinations when asked, denies visual hallucinations when asked   Risk Potential: Suicidal ideation - None  Homicidal ideation - None  Potential for aggression - No   Sensorium:  oriented to person, place and time/date   Memory:  recent and remote memory grossly intact   Consciousness:  alert and awake   Attention: attention span and concentration are age appropriate   Insight:  fair   Judgment: fair   Gait/Station: in bed   Motor Activity: no abnormal movements     Vital signs in last 24 hours:    Temp:  [97 8 °F (36 6 °C)-98 2 °F (36 8 °C)] 98 2 °F (36 8 °C)  HR:  [65-73] 65  Resp: [16] 16  BP: (132-134)/(60) 134/60    Laboratory results:   I have personally reviewed all pertinent laboratory/tests results  Most Recent Labs:   Lab Results   Component Value Date    WBC 5 64 05/03/2023    RBC 4 27 05/03/2023    HGB 13 2 05/03/2023    HCT 39 8 05/03/2023     05/03/2023    RDW 16 2 (H) 05/03/2023    NEUTROABS 3 65 05/03/2023    SODIUM 146 05/03/2023    K 4 6 05/03/2023     05/03/2023    CO2 35 (H) 05/03/2023    BUN 10 05/03/2023    CREATININE 0 93 05/03/2023    GLUC 87 05/03/2023    GLUF 87 05/03/2023    CALCIUM 9 3 05/03/2023    AST 27 05/03/2023    ALT 25 05/03/2023    ALKPHOS 79 05/03/2023    TP 6 0 (L) 05/03/2023    ALB 3 5 05/03/2023    TBILI 0 72 05/03/2023    CHOLESTEROL 112 05/03/2023    HDL 51 05/03/2023    TRIG 60 05/03/2023    LDLCALC 49 05/03/2023    NONHDLC 61 05/03/2023    AMMONIA 38 (H) 04/06/2014    [id number] 0 691 05/03/2023    FREET4 0 82 10/20/2015    T3FREE 2 22 (L) 10/20/2015    RPR Non-Reactive 03/17/2016    HGBA1C 4 3 04/08/2023    EAG 77 04/08/2023       Progress Toward Goals: improving    Assessment/Plan Patient struggling with chronic back and leg pain which she attributed causing increased depression and anxiety  No SI or HI  Plan is to continue the current medication with no changes  The patient was encouraged to ask for pain medication when needed from the nurse  We will also continue to monitor him for his mood, behavior, safety, sleep and response to medication  Principal Problem:    Mood disorder ([facility] )  Active Problems:    Hepatitis C virus infection    Gastroesophageal reflux disease    Medical clearance for psychiatric admission    Severe stimulant use disorder ([facility] )    Opioid use disorder in remission    Dysphagia    Recommended Treatment:     Planned medication and treatment changes:     All current active medications have been reviewed  Encourage group therapy, milieu therapy and occupational therapy  Behavioral Health checks every 7 minutes  Continue current medications:  Current Facility-Administered Medications   Medication Dose Route Frequency Provider Last Rate   • aluminum-magnesium hydroxide-simethicone  30 mL Oral Q4H PRN Sam Bello MD     • haloperidol lactate  2 5 mg Intramuscular Q6H PRN Max 4/day Sam Bello MD      And   • LORazepam  1 mg Intramuscular Q6H PRN Max 4/day Sam Bello MD      And   • benztropine  0 5 mg Intramuscular Q6H PRN Max 4/day Sam Bello MD     • haloperidol lactate  5 mg Intramuscular Q4H PRN Max 4/day Sam Bello MD      And   • LORazepam  2 mg Intramuscular Q4H PRN Max 4/day Sam Bello MD      And   • benztropine  1 mg Intramuscular Q4H PRN Max 4/day Sam Bello MD     • benztropine  1 mg Intramuscular Q4H PRN Max 6/day Sam Bello MD     • benztropine  1 mg Oral Q4H PRN Max 6/day Sam Bello MD     • cholecalciferol  2,000 Units Oral Daily Erika Stewart PA-C     • hydrOXYzine HCL  50 mg Oral Q6H PRN Max 4/day Sam Bello MD      Or   • diphenhydrAMINE  50 mg Intramuscular Q6H PRN Sam Bello MD     • doxepin  10 mg Oral HS PRN Lorrayne Kussmaul, MD     • FLUoxetine  20 mg Oral Daily Sam Bello MD     • glycerin-hypromellose-  1 drop Both Eyes Q3H PRN Sam Bello MD     • haloperidol  2 mg Oral Q4H PRN Max 6/day Sam Bello MD     • haloperidol  5 mg Oral Q6H PRN Max 4/day Sam Bello MD     • haloperidol  5 mg Oral Q4H PRN Max 4/day Sam Bello MD     • hydrOXYzine HCL  100 mg Oral Q6H PRN Max 4/day Sam Bello MD      Or   • LORazepam  2 mg Intramuscular Q6H PRN Sam Bello MD     • hydrOXYzine HCL  25 mg Oral Q6H PRN Max 4/day Sam Bello MD     • ibuprofen  400 mg Oral Q4H PRN Sam Bello MD     • ibuprofen  600 mg Oral Q6H PRN Sam Bello MD     • ibuprofen  800 mg Oral Q8H PRN Sam Bello MD     • melatonin  3 mg Oral HS PRN Sam Bello MD     • nicotine polacrilex  2 mg Oral Q2H PRN Yves Lefort, MD     • propranolol  10 mg Oral Q8H PRN Yves Lefort, MD     • QUEtiapine  200 mg Oral HS Ella Rebolledo MD     • senna-docusate sodium  1 tablet Oral Daily PRN Yves Lefort, MD         Risks / Benefits of Treatment:    Risks, benefits, and possible side effects of medications explained to patient and patient verbalizes understanding and agreement for treatment  Counseling / Coordination of Care:    Patient's progress discussed with staff in treatment team meeting  Medications, treatment progress and treatment plan reviewed with patient      Antwan Angel MD 05/06/23

## 2023-05-06 NOTE — NURSING NOTE
Patient was visible in the milieu but has an irritable demeanor  However he was cooperative with milieu routine, with staff and with  medications  Denies S I H I  A/H V/H

## 2023-05-07 RX ORDER — TRAZODONE HYDROCHLORIDE 50 MG/1
50 TABLET ORAL
Status: DISCONTINUED | OUTPATIENT
Start: 2023-05-07 | End: 2023-05-07

## 2023-05-07 RX ADMIN — BENZTROPINE MESYLATE 1 MG: 1 TABLET ORAL at 08:10

## 2023-05-07 RX ADMIN — CHOLECALCIFEROL TAB 25 MCG (1000 UNIT) 2000 UNITS: 25 TAB at 08:10

## 2023-05-07 RX ADMIN — FLUOXETINE 20 MG: 20 CAPSULE ORAL at 08:10

## 2023-05-07 RX ADMIN — QUETIAPINE FUMARATE 200 MG: 100 TABLET ORAL at 21:36

## 2023-05-07 NOTE — PLAN OF CARE
Problem: SELF HARM/SUICIDALITY  Goal: Will have no self-injury during hospital stay  Description: INTERVENTIONS:  - Q 15 MINUTES: Routine safety checks  - Q WAKING SHIFT & PRN: Assess risk to determine if routine checks are adequate to maintain patient safety  - Encourage patient to participate actively in care by formulating a plan to combat response to suicidal ideation, identify supports and resources  5/7/2023 1049 by Joshua Owens RN  Outcome: Progressing  5/7/2023 0748 by Joshua Owens RN  Outcome: Progressing     Problem: DEPRESSION  Goal: Will be euthymic at discharge  Description: INTERVENTIONS:  - Administer medication as ordered  - Provide emotional support via 1:1 interaction with staff  - Encourage involvement in milieu/groups/activities  - Monitor for social isolation  5/7/2023 1049 by Joshua Owens RN  Outcome: Progressing  5/7/2023 0748 by Joshua Owens RN  Outcome: Progressing     Problem: BEHAVIOR  Goal: Pt/Family maintain appropriate behavior and adhere to behavioral management agreement, if implemented  Description: INTERVENTIONS:  - Assess the family dynamic   - Encourage verbalization of thoughts and concerns in a socially appropriate manner  - Assess patient/family's coping skills and non-compliant behavior (including use of illegal substances)  - Utilize positive, consistent limit setting strategies supporting safety of patient, staff and others  - Initiate consult with Case Management, Spiritual Care or other ancillary services as appropriate  - If a patient's/visitor's behavior jeopardizes the safety of the patient, staff, or others, refer to organization procedure     - Notify Security of behavior or suspected illegal substances which indicate the need for search of the patient and/or belongings  - Encourage participation in the decision making process about a behavioral management agreement; implement if patient meets criteria  5/7/2023 1049 by Joshua Owens RN  Outcome: Progressing  5/7/2023 0748 by Lavern Ruggiero RN  Outcome: Progressing     Problem: SUBSTANCE USE/ABUSE  Goal: By discharge, will develop insight into their chemical dependency and sustain motivation to continue in recovery  Description: INTERVENTIONS:  - Attends all daily group sessions and scheduled AA groups  - Actively practices coping skills through participation in the therapeutic community and adherence to program rules  - Reviews and completes assignments from individual treatment plan  - Assist patient development of understanding of their personal cycle of addiction and relapse triggers  5/7/2023 1049 by Lavern Ruggiero RN  Outcome: Progressing  5/7/2023 0748 by Lavern Ruggiero RN  Outcome: Progressing     Problem: DISCHARGE PLANNING - CARE MANAGEMENT  Goal: Discharge to post-acute care or home with appropriate resources  Description: INTERVENTIONS:  - Conduct assessment to determine patient/family and health care team treatment goals, and need for post-acute services based on payer coverage, community resources, and patient preferences, and barriers to discharge  - Address psychosocial, clinical, and financial barriers to discharge as identified in assessment in conjunction with the patient/family and health care team  - Arrange appropriate level of post-acute services according to patient’s   needs and preference and payer coverage in collaboration with the physician and health care team  - Communicate with and update the patient/family, physician, and health care team regarding progress on the discharge plan  - Arrange appropriate transportation to post-acute venues  5/7/2023 1049 by Lavern Ruggiero RN  Outcome: Progressing  5/7/2023 0748 by Lavern Ruggiero RN  Outcome: Progressing     Problem: Nutrition/Hydration-ADULT  Goal: Nutrient/Hydration intake appropriate for improving, restoring or maintaining nutritional needs  Description: Monitor and assess patient's nutrition/hydration status for malnutrition  Collaborate with interdisciplinary team and initiate plan and interventions as ordered  Monitor patient's weight and dietary intake as ordered or per policy  Utilize nutrition screening tool and intervene as necessary  Determine patient's food preferences and provide high-protein, high-caloric foods as appropriate       INTERVENTIONS:  - Monitor oral intake, urinary output, labs, and treatment plans  - Assess nutrition and hydration status and recommend course of action  - Evaluate amount of meals eaten  - Assist patient with eating if necessary   - Allow adequate time for meals  - Recommend/ encourage appropriate diets, oral nutritional supplements, and vitamin/mineral supplements  - Order, calculate, and assess calorie counts as needed  - Recommend, monitor, and adjust tube feedings and TPN/PPN based on assessed needs  - Assess need for intravenous fluids  - Provide specific nutrition/hydration education as appropriate  - Include patient/family/caregiver in decisions related to nutrition  5/7/2023 1049 by Lyle Hearn, RN  Outcome: Progressing  5/7/2023 0748 by Lyle Hearn, RN  Outcome: Progressing

## 2023-05-07 NOTE — NURSING NOTE
Patient was very irritable this morning  Demanding to know what PRN's he had  Patient was guarded and reluctant to answer what he needed and why  Patient finally stated he wanted to go to sleep and wanted some sleep meds  Explained to patient that sleep medication is given at bedtime and not in the morning  Patient still insisting on knowing what PRN medications he has available  Gave patient verbal list and wanted to know about the cogentin  Patient then stated he had muscle stiffness  Patient given 1 mg of cogentin but patient does not appear to have muscle stiffness

## 2023-05-07 NOTE — PROGRESS NOTES
Progress Note - [address]  50 y o  male MRN: [id number]   Unit/Bed#: [facility] 350-02 Encounter: [id number]      Subjective: Patient chart reviewed and case discussed with the nurse  The patient was seen in his room today  The patient presented mildly agitated today  He stated that he is having withdrawals from cocaine and methamphetamine  When asked specifically he stated withdrawal symptoms includes sweating and not able to sleep  Denied any hallucinations or any SI or HI  The patient said that he went to the nurse to ask for the sleep medication in the morning and he was declined  I educated the patient about risk of taking sleep medication in the morning and then sleeping through the day and it will again lead to poor sleep at night  He was offered to take as needed medication Atarax as it can also help him to feel more calmer and less anxious  The patient though got upset about it and did not want to talk further  As per the nurse the patient was visible in the milieu last evening though had irritable demeanor  Compliant with medication  Denies any SI or HI or hallucination            ROS: all other systems are negative    Mental Status Evaluation:    Appearance:  casually dressed   Behavior:   Angry and not cooperative   Speech:   Loud at times   Mood:   Angry   Affect:   Mood congruent   Thought Process:  logical   Associations: intact associations   Thought Content:  no overt delusions   Perceptual Disturbances: denies auditory hallucinations when asked, denies visual hallucinations when asked   Risk Potential: Suicidal ideation - None  Homicidal ideation - None  Potential for aggression - No   Sensorium:  oriented to person, place and time/date   Memory:  recent and remote memory grossly intact   Consciousness:  alert and awake   Attention: attention span and concentration are age appropriate   Insight:  fair   Judgment: fair   Gait/Station: in bed   Motor Activity: no abnormal movements     Vital signs in last 24 hours:    Temp:  [97 5 °F (36 4 °C)-97 6 °F (36 4 °C)] 97 5 °F (36 4 °C)  HR:  [76-82] 76  Resp:  [16-20] 16  BP: (119-148)/(65-67) 119/67    Laboratory results:   I have personally reviewed all pertinent laboratory/tests results  Most Recent Labs:   Lab Results   Component Value Date    WBC 5 64 05/03/2023    RBC 4 27 05/03/2023    HGB 13 2 05/03/2023    HCT 39 8 05/03/2023     05/03/2023    RDW 16 2 (H) 05/03/2023    NEUTROABS 3 65 05/03/2023    SODIUM 146 05/03/2023    K 4 6 05/03/2023     05/03/2023    CO2 35 (H) 05/03/2023    BUN 10 05/03/2023    CREATININE 0 93 05/03/2023    GLUC 87 05/03/2023    GLUF 87 05/03/2023    CALCIUM 9 3 05/03/2023    AST 27 05/03/2023    ALT 25 05/03/2023    ALKPHOS 79 05/03/2023    TP 6 0 (L) 05/03/2023    ALB 3 5 05/03/2023    TBILI 0 72 05/03/2023    CHOLESTEROL 112 05/03/2023    HDL 51 05/03/2023    TRIG 60 05/03/2023    LDLCALC 49 05/03/2023    NONHDLC 61 05/03/2023    AMMONIA 38 (H) 04/06/2014    [id number] 0 691 05/03/2023    FREET4 0 82 10/20/2015    T3FREE 2 22 (L) 10/20/2015    RPR Non-Reactive 03/17/2016    HGBA1C 4 3 04/08/2023    EAG 77 04/08/2023       Progress Toward Goals: improving    Assessment/Plan Patient did not mention about any pain today but was complaining of withdrawal symptoms including poor sleep and sweating  No SI or HI  No hallucinations reported  Was upset after advised to take Atarax and did not want to do continue the meeting  Plan at this time is to continue with his current medication with no changes  The patient is on melatonin and doxepin as needed for insomnia and was advised to consider taking it if has difficulty sleeping at night  The patient was encouraged to consider taking as needed medication for anxiety and asked the nurse for it  We will continue monitor the patient for his mood, behavior, safety, sleep and response to medication    Principal Problem:    Mood disorder [facility])  Active Problems:    Hepatitis C virus infection    Gastroesophageal reflux disease    Medical clearance for psychiatric admission    Severe stimulant use disorder ([address] )    Opioid use disorder in remission    Dysphagia    Recommended Treatment:     Planned medication and treatment changes:     All current active medications have been reviewed  Encourage group therapy, milieu therapy and occupational therapy  Behavioral Health checks every 7 minutes  Continue current medications:  Current Facility-Administered Medications   Medication Dose Route Frequency Provider Last Rate   • aluminum-magnesium hydroxide-simethicone  30 mL Oral Q4H PRN Ifeanyi Castro MD     • haloperidol lactate  2 5 mg Intramuscular Q6H PRN Max 4/day Ifeanyi Castro MD      And   • LORazepam  1 mg Intramuscular Q6H PRN Max 4/day Ifeanyi Castro MD      And   • benztropine  0 5 mg Intramuscular Q6H PRN Max 4/day Ifeanyi Castro MD     • haloperidol lactate  5 mg Intramuscular Q4H PRN Max 4/day Ifeanyi Castro MD      And   • LORazepam  2 mg Intramuscular Q4H PRN Max 4/day Ifeanyi Castro MD      And   • benztropine  1 mg Intramuscular Q4H PRN Max 4/day Ifeanyi Castro MD     • benztropine  1 mg Intramuscular Q4H PRN Max 6/day Ifeanyi Castro MD     • benztropine  1 mg Oral Q4H PRN Max 6/day Ella Alonso MD     • cholecalciferol  2,000 Units Oral Daily Eboni Lutz PA-C     • hydrOXYzine HCL  50 mg Oral Q6H PRN Max 4/day Ifeanyi Castro MD      Or   • diphenhydrAMINE  50 mg Intramuscular Q6H PRN Ifeanyi Castro MD     • doxepin  10 mg Oral HS PRN Beto Masterson MD     • FLUoxetine  20 mg Oral Daily Ifeanyi Castro MD     • glycerin-hypromellose-  1 drop Both Eyes Q3H PRN Ifeanyi Castro MD     • haloperidol  2 mg Oral Q4H PRN Max 6/day Ifeanyi Castro MD     • haloperidol  5 mg Oral Q6H PRN Max 4/day Ifeanyi Castro MD     • haloperidol  5 mg Oral Q4H PRN Max 4/day Ifeanyi Castro MD     • hydrOXYzine HCL  100 mg Oral Q6H PRN Max 4/day Demetris Esposito MD      Or   • LORazepam  2 mg Intramuscular Q6H PRN Demetris Esposito MD     • hydrOXYzine HCL  25 mg Oral Q6H PRN Max 4/day Demetris Esposito MD     • ibuprofen  400 mg Oral Q4H PRN Demetris Esposito MD     • ibuprofen  600 mg Oral Q6H PRN Demetris Esposito MD     • ibuprofen  800 mg Oral Q8H PRN Demetris Esposito MD     • melatonin  3 mg Oral HS PRN Demetris Esposito MD     • nicotine polacrilex  2 mg Oral Q2H PRN Demetris Esposito MD     • propranolol  10 mg Oral Q8H PRN Demetris Esposito MD     • QUEtiapine  200 mg Oral HS Ella Bran MD     • senna-docusate sodium  1 tablet Oral Daily PRN Demetris Esposito MD         Risks / Benefits of Treatment:    Risks, benefits, and possible side effects of medications explained to patient and patient verbalizes understanding and agreement for treatment  Counseling / Coordination of Care:    Patient's progress discussed with staff in treatment team meeting  Medications, treatment progress and treatment plan reviewed with patient      Darleen Howard MD 05/07/23

## 2023-05-07 NOTE — NURSING NOTE
Patient has been in the milieu and is social with peers  He is less irritable this evening and more ready to approach staff  He was cooperative with HS medications  He denies S I H I  A/H V/H

## 2023-05-08 RX ADMIN — QUETIAPINE FUMARATE 200 MG: 100 TABLET ORAL at 22:09

## 2023-05-08 RX ADMIN — FLUOXETINE 20 MG: 20 CAPSULE ORAL at 08:36

## 2023-05-08 RX ADMIN — CHOLECALCIFEROL TAB 25 MCG (1000 UNIT) 2000 UNITS: 25 TAB at 08:36

## 2023-05-08 NOTE — PROGRESS NOTES
Attempted to do his relapse prevention since the plan was to begin to do the bed search  Patient was irritable stating he is not ready he still needs mental health treatment  Informed him that his behaviors does not reflect his commitment to his recovery since he has been hugging the bed and not attending any groups  Patient verbalized feeling angry that people are judging him  This writer encouraged him to show his commitment by actively participating in treatment

## 2023-05-08 NOTE — NURSING NOTE
Pt calm and cooperative with an irritable and constricted affect  Pt reports severe depression and moderate anxiety  Denies SI/HI/AVH  Sociable with select peer on the milieu throughout the day  No requests at this time

## 2023-05-08 NOTE — NURSING NOTE
"Pt is has an irritable edge and constricted affect, hyperverbal at times  Describes mood as \"not good  \" Denies SI/HI/AVH  Reports moderate anxiety and severe depression  Pt is sociable with peers on the milieu either in group and in dining throughout the day       "

## 2023-05-08 NOTE — PROGRESS NOTES
Progress Note - [address]  50 y o  male MRN: [id number]  Unit/Bed#: [facility] 350-02 Encounter: [id number]    Assessment/Plan   Principal Problem:    Mood disorder ([facility] )  Active Problems:    Hepatitis C virus infection    Gastroesophageal reflux disease    Medical clearance for psychiatric admission    Severe stimulant use disorder ([facility] )    Opioid use disorder in remission    Dysphagia      Subjective: Patient was seen, chart was reviewed, and case was discussed with entire team      In room sitting on bed  Patient is rather surly and irritable  Multiple complaints about his mental health saying that he is coming off meth use  Patient still reports being depressed and anxious  He denies having thoughts to hurt himself or anyone else  Patient verbalized that he does need to go to groups and that he is motivated for rehab at some point but wants to take care of his mental health first   Patient has been medication compliant  Psychiatric Review of Systems:  Behavior over the last 24 hours: unchanged  Sleep: improving  Appetite: adequate  Medication side effects: none verbalized  Medical ROS: Complete review of systems is negative except as noted above              Vitals:  Vitals:    05/08/23 0747   BP: 147/76   Pulse: 68   Resp: 20   Temp: (!) 97 4 °F (36 3 °C)   SpO2: 100%       Mental Status Exam:    Appearance:  alert, good eye contact, appears stated age, casually dressed and appropriate grooming and hygiene   Behavior:  hostile and Overall cooperative   Speech:  spontaneous, loud and coherent   Mood:  irritable   Affect:  constricted   Thought Process:  goal directed, Perseverative   Thought Content:  no verbalized delusions or overt paranoia   Perceptual Disturbances: no reported hallucinations and does not appear to be responding to internal stimuli at this time   Risk Potential: Suicidal ideation - None at present  Homicidal ideation - None at present  Potential for aggression - Not at present   Sensorium:  oriented to person, place and time/date   Memory:  recent and remote memory grossly intact   Consciousness:  alert and awake   Attention/Concentration: attention span and concentration appear shorter than expected for age   Insight:  fair   Judgment: fair   Gait/Station: in bed   Motor Activity: no abnormal movements     Progress Toward Goals: Progressing    Recommended Treatment: Continue with pharmacotherapy, group therapy, milieu therapy and occupational therapy  Continue frequent safety checks and vitals per unit protocol  Continue with medical management as indicated  Continue coordinating with case management regarding disposition  1  Continue current medications and treatments for now  2   Discharge planning      Risks, benefits and possible side effects of Medications: Risks, benefits, and possible side effects of medications have previously been explained  No new medications at this time        Current Medications:  Current Facility-Administered Medications   Medication Dose Route Frequency Provider Last Rate   • aluminum-magnesium hydroxide-simethicone  30 mL Oral Q4H PRN Meredith Vargas MD     • haloperidol lactate  2 5 mg Intramuscular Q6H PRN Max 4/day Meredith Vargas MD      And   • LORazepam  1 mg Intramuscular Q6H PRN Max 4/day Meredith Vargas MD      And   • benztropine  0 5 mg Intramuscular Q6H PRN Max 4/day Meredith Vargas MD     • haloperidol lactate  5 mg Intramuscular Q4H PRN Max 4/day Meredith Vargas MD      And   • LORazepam  2 mg Intramuscular Q4H PRN Max 4/day Meredith Vargas MD      And   • benztropine  1 mg Intramuscular Q4H PRN Max 4/day Meredith Vargas MD     • benztropine  1 mg Intramuscular Q4H PRN Max 6/day Meredith Vargas MD     • benztropine  1 mg Oral Q4H PRN Max 6/day Ella Carney MD     • cholecalciferol  2,000 Units Oral Daily Elmer Tavares PA-C     • hydrOXYzine HCL  50 mg Oral Q6H PRN Max 4/day Ella Carney MD      Or • diphenhydrAMINE  50 mg Intramuscular Q6H PRN Lajune Phoenix, MD     • doxepin  10 mg Oral HS PRN Alessandra Walker MD     • FLUoxetine  20 mg Oral Daily Lajune Phoenix, MD     • glycerin-hypromellose-  1 drop Both Eyes Q3H PRN Lajune Phoenix, MD     • haloperidol  2 mg Oral Q4H PRN Max 6/day Lajune Phoenix, MD     • haloperidol  5 mg Oral Q6H PRN Max 4/day Lajune Phoenix, MD     • haloperidol  5 mg Oral Q4H PRN Max 4/day Lajune Phoenix, MD     • hydrOXYzine HCL  100 mg Oral Q6H PRN Max 4/day Lajune Phoenix, MD      Or   • LORazepam  2 mg Intramuscular Q6H PRN Lajune Phoenix, MD     • hydrOXYzine HCL  25 mg Oral Q6H PRN Max 4/day Lajune Phoenix, MD     • ibuprofen  400 mg Oral Q4H PRN Lajune Phoenix, MD     • ibuprofen  600 mg Oral Q6H PRN Lajune Phoenix, MD     • ibuprofen  800 mg Oral Q8H PRN Lajune Phoenix, MD     • melatonin  3 mg Oral HS PRN Lajune Phoenix, MD     • nicotine polacrilex  2 mg Oral Q2H PRN Lajune Phoenix, MD     • propranolol  10 mg Oral Q8H PRN Lajune Phoenix, MD     • QUEtiapine  200 mg Oral HS Ella Malave MD     • senna-docusate sodium  1 tablet Oral Daily PRN Ella Malave MD         Behavioral Health Medications:   all current active meds have been reviewed  Laboratory results:  I have personally reviewed all pertinent laboratory/tests results  No results found for this or any previous visit (from the past 48 hour(s))           Jelena Hearn DO 05/08/23

## 2023-05-08 NOTE — PROGRESS NOTES
05/08/23 0858   Team Meeting   Meeting Type Daily Rounds   Team Members Present   Team Members Present Physician;Nurse;   Physician Team Member [address] [facility]   Care Management Team Member [place]   Patient/Family Present   Patient Present No   Patient's Family Present No   Visible, social on the unit  Denies all  Good appetite  Med/meal compliant  Pt intermittently irritable over the weekend  DC to rehab with possible bed search starting Wednesday

## 2023-05-08 NOTE — PROGRESS NOTES
05/08/23 0953   Activity/Group Checklist   Group Community meeting   Attendance Attended   Attendance Duration (min) 16-30   Interactions Interacted appropriately   Affect/Mood Appropriate   Goals Achieved Able to listen to others; Able to engage in interactions; Able to self-disclose; Able to recieve feedback

## 2023-05-08 NOTE — NURSING NOTE
Patient has been in the milieu and very comfortably so  He is more or less exclusively interacting with another peer and there has been no talk of withdrawal symptoms  He wanted to be very clear that he was receiving only Seroquel at  and that he was not being given anything else  He denies S I H I  A/H V/H

## 2023-05-08 NOTE — PROGRESS NOTES
05/08/23 1300   Activity/Group Checklist   Group   (recovery group)   Attendance Attended   Attendance Duration (min) 46-60   Interactions Interacted appropriately   Affect/Mood Appropriate   Goals Achieved Discussed coping strategies; Discussed self-esteem issues; Able to listen to others; Able to engage in interactions; Able to reflect/comment on own behavior;Able to self-disclose; Able to recieve feedback     Patient runs from uncomfortable feelings

## 2023-05-08 NOTE — PROGRESS NOTES
[facility] Group Note     05/08/23 1100 05/08/23 1400   Activity/Group Checklist   Group Life Skills  (Coping Skills [employer]) Personal control  (Mindfulness Technique - 5 Senses Grounding)   Attendance Attended Attended   Attendance Duration (min) 46-60 31-45  (arrived late to group)   Interactions Interacted appropriately Interacted appropriately  (but had difficulty staying on topic)   Affect/Mood Appropriate;Bright Appropriate;Bright   Goals Achieved Identified feelings; Identified triggers; Discussed coping strategies; Able to listen to others; Able to engage in interactions; Able to reflect/comment on own behavior;Able to self-disclose; Able to recieve feedback; Able to give feedback to another Identified feelings; Identified triggers; Discussed coping strategies; Able to listen to others; Able to engage in interactions; Able to reflect/comment on own behavior;Able to recieve feedback; Able to give feedback to another

## 2023-05-09 RX ADMIN — FLUOXETINE 20 MG: 20 CAPSULE ORAL at 08:38

## 2023-05-09 RX ADMIN — CHOLECALCIFEROL TAB 25 MCG (1000 UNIT) 2000 UNITS: 25 TAB at 08:38

## 2023-05-09 RX ADMIN — QUETIAPINE FUMARATE 200 MG: 100 TABLET ORAL at 22:06

## 2023-05-09 NOTE — PROGRESS NOTES
05/09/23 1300   Activity/Group Checklist   Group   (recovery group- family roles/rules)   Attendance Attended   Attendance Duration (min) 46-60   Interactions Interacted appropriately   Affect/Mood Appropriate   Goals Achieved Discussed coping strategies; Discussed self-esteem issues; Able to listen to others; Able to engage in interactions; Able to reflect/comment on own behavior;Able to self-disclose; Able to recieve feedback

## 2023-05-09 NOTE — NURSING NOTE
"Pt reporting severe depression and moderate anxiety this morning  Denies SI/HI/AVH  Pt has a constricted and irritable affect  Several minutes later pt asked another nurse if she was his nurse after just being interviewed by this nurse telling him he would be getting his medication shortly  Pt then requested to have his room changed  Explained to pt this was not possible at this time  Pt then began to complain sarcastically and facetiously with an irritable edge  This nurse then explained why this room change was not possible  Pt then began to say how his room mate wakes up during the night and this startles him  Pt became hostile with his tone  Pt became angry and cursed at this nurse and said he would not be taking his meds from him  Pt said \"If you come to me with those meds I'm going to shove them up your ass\" and stormed to his room slamming the door  Security up for a walk through, pt remaining in room, no further intervention at this time     "

## 2023-05-09 NOTE — PLAN OF CARE
Problem: SELF HARM/SUICIDALITY  Goal: Will have no self-injury during hospital stay  Description: INTERVENTIONS:  - Q 15 MINUTES: Routine safety checks  - Q WAKING SHIFT & PRN: Assess risk to determine if routine checks are adequate to maintain patient safety  - Encourage patient to participate actively in care by formulating a plan to combat response to suicidal ideation, identify supports and resources  Outcome: Progressing     Problem: DEPRESSION  Goal: Will be euthymic at discharge  Description: INTERVENTIONS:  - Administer medication as ordered  - Provide emotional support via 1:1 interaction with staff  - Encourage involvement in milieu/groups/activities  - Monitor for social isolation  Outcome: Progressing     Problem: BEHAVIOR  Goal: Pt/Family maintain appropriate behavior and adhere to behavioral management agreement, if implemented  Description: INTERVENTIONS:  - Assess the family dynamic   - Encourage verbalization of thoughts and concerns in a socially appropriate manner  - Assess patient/family's coping skills and non-compliant behavior (including use of illegal substances)  - Utilize positive, consistent limit setting strategies supporting safety of patient, staff and others  - Initiate consult with Case Management, Spiritual Care or other ancillary services as appropriate  - If a patient's/visitor's behavior jeopardizes the safety of the patient, staff, or others, refer to organization procedure     - Notify Security of behavior or suspected illegal substances which indicate the need for search of the patient and/or belongings  - Encourage participation in the decision making process about a behavioral management agreement; implement if patient meets criteria  Outcome: Progressing     Problem: SUBSTANCE USE/ABUSE  Goal: By discharge, will develop insight into their chemical dependency and sustain motivation to continue in recovery  Description: INTERVENTIONS:  - Attends all daily group sessions and scheduled AA groups  - Actively practices coping skills through participation in the therapeutic community and adherence to program rules  - Reviews and completes assignments from individual treatment plan  - Assist patient development of understanding of their personal cycle of addiction and relapse triggers  Outcome: Progressing     Problem: DISCHARGE PLANNING - CARE MANAGEMENT  Goal: Discharge to post-acute care or home with appropriate resources  Description: INTERVENTIONS:  - Conduct assessment to determine patient/family and health care team treatment goals, and need for post-acute services based on payer coverage, community resources, and patient preferences, and barriers to discharge  - Address psychosocial, clinical, and financial barriers to discharge as identified in assessment in conjunction with the patient/family and health care team  - Arrange appropriate level of post-acute services according to patient’s   needs and preference and payer coverage in collaboration with the physician and health care team  - Communicate with and update the patient/family, physician, and health care team regarding progress on the discharge plan  - Arrange appropriate transportation to post-acute venues  Outcome: Progressing     Problem: Nutrition/Hydration-ADULT  Goal: Nutrient/Hydration intake appropriate for improving, restoring or maintaining nutritional needs  Description: Monitor and assess patient's nutrition/hydration status for malnutrition  Collaborate with interdisciplinary team and initiate plan and interventions as ordered  Monitor patient's weight and dietary intake as ordered or per policy  Utilize nutrition screening tool and intervene as necessary  Determine patient's food preferences and provide high-protein, high-caloric foods as appropriate       INTERVENTIONS:  - Monitor oral intake, urinary output, labs, and treatment plans  - Assess nutrition and hydration status and recommend course of action  - Evaluate amount of meals eaten  - Assist patient with eating if necessary   - Allow adequate time for meals  - Recommend/ encourage appropriate diets, oral nutritional supplements, and vitamin/mineral supplements  - Order, calculate, and assess calorie counts as needed  - Recommend, monitor, and adjust tube feedings and TPN/PPN based on assessed needs  - Assess need for intravenous fluids  - Provide specific nutrition/hydration education as appropriate  - Include patient/family/caregiver in decisions related to nutrition  Outcome: Progressing     Problem: SAFETY, RESTRAINT - VIOLENT/SELF-DESTRUCTIVE  Goal: Remains free of harm/injury from restraints (Restraint for Violent/Self-Destructive Behavior)  Description: INTERVENTIONS:  - Instruct patient/family regarding restraint use   - Assess and monitor physiologic and psychological status   - Provide interventions and comfort measures to meet assessed patient needs   - Ensure continuous in person monitoring is provided   - Identify and implement measures to help patient regain control  - Assess readiness for release of restraint  Outcome: Progressing  Goal: Returns to optimal restraint-free functioning  Description: INTERVENTIONS:  - Assess the patient's behavior and symptoms that indicate continued need for restraint  - Identify and implement measures to help patient regain control  - Assess readiness for release of restraint   Outcome: Progressing

## 2023-05-09 NOTE — PROGRESS NOTES
05/09/23 0946   Activity/Group Checklist   Group Community meeting   Attendance Attended   Attendance Duration (min) 16-30   Interactions Interacted appropriately   Affect/Mood Appropriate   Goals Achieved Able to listen to others; Able to engage in interactions; Able to self-disclose; Able to recieve feedback

## 2023-05-09 NOTE — PROGRESS NOTES
Progress Note - [address]  50 y o  male MRN: [id number]  Unit/Bed#: [facility] 350-02 Encounter: [id number]    Assessment/Plan   Principal Problem:    Mood disorder ([facility] )  Active Problems:    Hepatitis C virus infection    Gastroesophageal reflux disease    Medical clearance for psychiatric admission    Severe stimulant use disorder ([facility] )    Opioid use disorder in remission    Dysphagia      Subjective: Patient was seen, chart was reviewed, and case was discussed with entire team    Patient seen out in dining area  Patient is listening to music and is currently calm but appears to have somewhat of an irritable affect    Patient reports his mood is not good  However he cannot give insight into why  He still talks about going to rehab possibly at [facility]   He says that for now though he wants to work on his mental health  Patient patient reports being on Sublocade feels that he may be coming down off of it  It is unclear when his last injection was or the next one was due  He says when you are out on the street you forget about those things and focus on getting your next fix  Patient does not endorse thoughts to harm himself or others  Denies any auditory or visual hallucinations  Patient is medication compliant  Psychiatric Review of Systems:  Behavior over the last 24 hours: unchanged  Sleep: improving  Appetite: adequate  Medication side effects: none verbalized  Medical ROS: Complete review of systems is negative except as noted above              Vitals:  Vitals:    05/09/23 0958   BP: 122/63   Pulse: 84   Resp: 18   Temp: 99 °F (37 2 °C)   SpO2: 98%       Mental Status Exam:    Appearance:  alert, appears stated age, casually dressed and appropriate grooming and hygiene   Behavior:  calm, cooperative and But gets edgy at times during conversation   Speech:  spontaneous, coherent and Patient escalates in loudness   Mood:  anxious, depressed and irritable   Affect: constricted, Irritable   Thought Process:  goal directed, perseverative   Thought Content:  negative thoughts   Perceptual Disturbances: no reported hallucinations and does not appear to be responding to internal stimuli at this time   Risk Potential: Suicidal ideation - None at present  Homicidal ideation - None at present  Potential for aggression - Not at present   Sensorium:  oriented to person, place and time/date   Memory:  recent and remote memory grossly intact   Consciousness:  alert and awake   Attention/Concentration: attention span and concentration appear shorter than expected for age   Insight:  limited   Judgment: limited   Gait/Station: Patient sitting   Motor Activity: no abnormal movements     Progress Toward Goals: Progressing slowly    Recommended Treatment: Continue with pharmacotherapy, group therapy, milieu therapy and occupational therapy  Continue frequent safety checks and vitals per unit protocol  Continue with medical management as indicated  Continue coordinating with case management regarding disposition  1  Continue current medications and treatments for now  2   Discharge planning      Risks, benefits and possible side effects of Medications: Risks, benefits, and possible side effects of medications have previously been explained  No new medications at this time        Current Medications:  Current Facility-Administered Medications   Medication Dose Route Frequency Provider Last Rate   • aluminum-magnesium hydroxide-simethicone  30 mL Oral Q4H PRN Julianne Cuenca MD     • haloperidol lactate  2 5 mg Intramuscular Q6H PRN Max 4/day Julianne Cuenca MD      And   • LORazepam  1 mg Intramuscular Q6H PRN Max 4/day Julianne Cuenca MD      And   • benztropine  0 5 mg Intramuscular Q6H PRN Max 4/day Julianne Cuenca MD     • haloperidol lactate  5 mg Intramuscular Q4H PRN Max 4/day Julianne Cuenca MD      And   • LORazepam  2 mg Intramuscular Q4H PRN Max 4/day Julianne Cuenca MD And   • benztropine  1 mg Intramuscular Q4H PRN Max 4/day Ely Barajas MD     • benztropine  1 mg Intramuscular Q4H PRN Max 6/day Ely Barajas MD     • benztropine  1 mg Oral Q4H PRN Max 6/day Ely Barajas MD     • cholecalciferol  2,000 Units Oral Daily Juana Sage PA-C     • hydrOXYzine HCL  50 mg Oral Q6H PRN Max 4/day Ely Barajas MD      Or   • diphenhydrAMINE  50 mg Intramuscular Q6H PRN Ely Barajas MD     • doxepin  10 mg Oral HS PRN Analy Mc MD     • FLUoxetine  20 mg Oral Daily Ely Barajas MD     • glycerin-hypromellose-  1 drop Both Eyes Q3H PRN Ely Barajas MD     • haloperidol  2 mg Oral Q4H PRN Max 6/day Ely Barajas MD     • haloperidol  5 mg Oral Q6H PRN Max 4/day Ely Barajas MD     • haloperidol  5 mg Oral Q4H PRN Max 4/day Ely Barajas MD     • hydrOXYzine HCL  100 mg Oral Q6H PRN Max 4/day Ely Barajas MD      Or   • LORazepam  2 mg Intramuscular Q6H PRN Ely Barajas MD     • hydrOXYzine HCL  25 mg Oral Q6H PRN Max 4/day Ely Barajas MD     • ibuprofen  400 mg Oral Q4H PRN Ely Barajas MD     • ibuprofen  600 mg Oral Q6H PRN Ely Barajas MD     • ibuprofen  800 mg Oral Q8H PRN Ely Barajas MD     • melatonin  3 mg Oral HS PRN Ely Barajas MD     • nicotine polacrilex  2 mg Oral Q2H PRN Ely Barajas MD     • propranolol  10 mg Oral Q8H PRN Ely Barajas MD     • QUEtiapine  200 mg Oral HS Ella Love MD     • senna-docusate sodium  1 tablet Oral Daily PRN Ella Love MD         Behavioral Health Medications:   all current active meds have been reviewed  Laboratory results:  I have personally reviewed all pertinent laboratory/tests results  No results found for this or any previous visit (from the past 48 hour(s))           Ella Godinez DO 05/09/23

## 2023-05-09 NOTE — PROGRESS NOTES
05/09/23 0907   Team Meeting   Meeting Type Daily Rounds   Team Members Present   Team Members Present Physician;Nurse;   Physician Team Member [place]   Nursing Team Member [facility] Management Team Member [place]   Patient/Family Present   Patient Present No   Patient's Family Present No     Pt is med/meal compliant  Irritable edge, easily agitated with peers  Bed search possible later this week

## 2023-05-10 RX ORDER — FLUOXETINE HYDROCHLORIDE 20 MG/1
40 CAPSULE ORAL DAILY
Status: DISCONTINUED | OUTPATIENT
Start: 2023-05-11 | End: 2023-05-12 | Stop reason: HOSPADM

## 2023-05-10 RX ORDER — BUPRENORPHINE AND NALOXONE 8; 2 MG/1; MG/1
8 FILM, SOLUBLE BUCCAL; SUBLINGUAL 2 TIMES DAILY
Status: DISCONTINUED | OUTPATIENT
Start: 2023-05-10 | End: 2023-05-11

## 2023-05-10 RX ORDER — ONDANSETRON 4 MG/1
4 TABLET, ORALLY DISINTEGRATING ORAL EVERY 6 HOURS PRN
Status: DISPENSED | OUTPATIENT
Start: 2023-05-10 | End: 2023-05-12

## 2023-05-10 RX ADMIN — CHOLECALCIFEROL TAB 25 MCG (1000 UNIT) 2000 UNITS: 25 TAB at 08:19

## 2023-05-10 RX ADMIN — FLUOXETINE 20 MG: 20 CAPSULE ORAL at 08:20

## 2023-05-10 RX ADMIN — QUETIAPINE FUMARATE 200 MG: 100 TABLET ORAL at 21:25

## 2023-05-10 RX ADMIN — BUPRENORPHINE AND NALOXONE 8 MG: 8; 2 FILM BUCCAL; SUBLINGUAL at 21:25

## 2023-05-10 RX ADMIN — ONDANSETRON 4 MG: 4 TABLET, ORALLY DISINTEGRATING ORAL at 12:49

## 2023-05-10 NOTE — NURSING NOTE
Patient is irritable with staff and need redirection to be respectful towards staff  Patient also reminded to maintain healthy boundaries between female peers  Patient is social with peers and affect is bright  Patient reports anxiety and depression, but is pleasant and bright when interacting with peers within the milieu  Patient is compliant with scheduled medications  Staff to maintain continuous rounding for safety and support

## 2023-05-10 NOTE — PROGRESS NOTES
DATE: 05- [facility]    NAME:   Perez Galaviz  AGE:  50  : 1970 RECOMMENDATION:   INPATIENT DRUG REHABILITATION  ASSESSMENT:  Enzo Coy, PHD, [facility]  Manager Therapeutic Services    PRESENTATION: Mr Joshua Yadav was attentive during assessment, cooperative, insightful, goal directed and motivated for rehabilitation  Mr Joshua Yadav is a 62-year-old black male, small in stature, good eye contact during assessment  Mr Joshua Yadav reported that he has used drugs from the age of 27years old  He has used both Heroin and Cocaine which eventually he used IV  He spent many years in long term due to criminal activity to support his addiction  He has worked in the past, but his addiction caused all employment to be short lived  Mr Joshua Yadav has been homeless multiple times in his life and has sabotaged all previous rehabs  ONSET OF RELAPSE Mr Joshua Yadav explained that he was clean for five years, but the medical issues of his fiancé was taking a tole on him  His fiancé had cancer which returned for the second time  She would not go for treatment and how that he is in the hospital she does not want him back out of embarrassment of her health  GENERAL SYMPTOMS OF CHEMICAL DEPENDENCY Mr Joshua laureano is experiencing withdrawal symptoms, headache, nausea, vomiting, physical pain  TIME SOBER Alcohol was not his drug of choice  REHAB TIME Mr Joshua Yadav reported that he has been in so many rehabs to count which was at least 10-12 times  PHYSICAL- WITHDRAWAL SYMPTOMS OF CHEMICAL DEPENDENCY Mr Joshua laureano is experiencing withdrawal symptoms, headache, nausea, vomiting, physical pain  Mr Joshua Yadav also reported that he has liver issues and not sure reason  He most recently has had difficulty swallowing food and has been seen by Speech Therapist        Karyle Netter Mr Joshua Yadav reported that he has been in long term for multiple charges, theft, assault all due to support his addiction  PROBLEMS WITH EMPLOYMENT/RELATIONSHIPS Mr Lisette Toth reported that he has lost most if not all his jobs due to his drug addiction  He stated that he was either fired or he would quick  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  PERSONALITY CHANGES DUE TO DRINKING/DRUGGING Mr Lisette Toth reported that when he was high, he did not care about anything or anyone  This is how he became distant from all his family supports  Mr Lisette Toth suffers from shame and guilt from his past addiction  DRUG USE HISTORY Mr Lisette Toth reported that he started using drugs (Cocaine) when he was 26 yo  He would use about 1-2 grams per day  At 20-23 yo he started with 2-3 bags of Heroin and then started IV use in 1999  He was clean for six years from [id number] and relapse due to his fiancé’s medical issues and refusing medical attention  CURRENT MEDICATIONS Buprenorphine-naloxone (suboxone) film 8mg 2x day  Cholecalciferol ( vit D) tab 2 000 units daily  Fluoxetine (Prozac) cap 40 mg daily  Quetiapine (Seroquel) 200 mg daily    MEDICAL ISSUES DUE TO USE  See above      FAMILY HISTORY       No report of family history of addiction in the family unit  SUMMARY Overall Mr Lisette Toth was cooperative, insightful, good historian and appears to be motivated for inpatient rehabilitation  He made good eye contact during assessment  Alberto Pizano, PhD, \[facility] [place] [facility]\""W, [facility]  [address] [facility]

## 2023-05-10 NOTE — SPEECH THERAPY NOTE
"Speech Language/Pathology    Speech/Language Pathology Progress Note    Patient Name: Cedric Reyes  Today's Date: 5/10/2023                     SLP RECOMMENDATIONS:         Diet: Regular         Liquids: Thin liquids         Medications: as tolerated         Aspiration Precautions: upright, reflux precautions      Summary:  Pt seen for ongoing dysphagia therapy  Pt was upgraded to Regular consistency diet from Level 3 dental soft/thin diet  Pt reports he is liking the diet upgrade and feels he does okay with it as long as he takes his time and chews thoroughly  Pt continues to report intermittent sensation of food \"getting stuck\" after swallowing  SLP reviewed aspiration and reflux precautions with pt  Pt observed with portion of AM meal of Regular/thin liquids  Pt with prolonged, but functional mastication  No overt s/s aspiration observed  SLP to follow x1 to ensure diet tolerance and then will likely d/c from [address] [place] [address]  Assessment:  No overt s/s aspiration with thin liquids  Functional mastication of regular solids       Plan/Recommendations:  Regular consistency/thin liquids   General aspiration/reflux precautions   Consider GI consult         Lab Results   Component Value Date    WBC 5 64 05/03/2023    HGB 13 2 05/03/2023    HCT 39 8 05/03/2023    MCV 93 05/03/2023     05/03/2023           Problem List  Principal Problem:    Mood disorder ([facility] )  Active Problems:    Hepatitis C virus infection    Gastroesophageal reflux disease    Medical clearance for psychiatric admission    Severe stimulant use disorder ([facility] )    Opioid use disorder in remission    Dysphagia       Past Medical History  Past Medical History:   Diagnosis Date   • Anxiety    • Bipolar I disorder, most recent episode depressed, severe with psychotic features ([facility] ) 2/23/2016   • Depression    • Foot infection    • Hepatitis C virus infection 02/23/2016    has had treatment   • Psychiatric illness    • Self-injurious behavior    • " Substance abuse ([facility] )    • Suicide attempt [facility])    • Withdrawal symptoms, drug or narcotic [facility])         Past Surgical History  History reviewed  No pertinent surgical history

## 2023-05-10 NOTE — NURSING NOTE
"As unit lights were dimmed for the evening,  female peer that pt had been speaking too all evening stood and excused herself for bed  Pt then said tersely, \"where are you going? Sit down! \"   "

## 2023-05-10 NOTE — PROGRESS NOTES
05/10/23 0901   Team Meeting   Meeting Type Daily Rounds   Team Members Present   Team Members Present Physician;Nurse;   Physician Team Member [place]   [facility] [place]   Patient/Family Present   Patient Present No   Patient's Family Present No   Pt confrontational and disrespectful toward staff  Pt irritable and needed redirection last night  Med/meal compliant  Bed search poss tomorrow

## 2023-05-10 NOTE — PROGRESS NOTES
"Progress Note - [address]  50 y o  male MRN: [id number]  Unit/Bed#: U 350-02 Encounter: [id number]    Assessment/Plan   Principal Problem:    Mood disorder ([facility] )  Active Problems:    Hepatitis C virus infection    Gastroesophageal reflux disease    Medical clearance for psychiatric admission    Severe stimulant use disorder ([facility] )    Opioid use disorder in remission    Dysphagia    Recommended Treatment:   Increase Prozac 40 mg daily for depression  Seroquel 200mg daily for depression and mood  Suboxone 8-2mg BID for OUD    All current active medications have been reviewed  Encourage group therapy, milieu therapy and occupational therapy  Behavioral Health checks every 7 minutes  Medical management per SL  Legal Status: 201  ----------------------------------------      Subjective: Patient discussed in nursing report and overnight notes and charting reviewed  Per nursing report, patient attending group and milieu activities  Conversing with another female peer on the unit for which he had a relationship with prior to admission; reminded to maintain appropriate boundaries  He reports that he is doing \"okay\"  He reports that he is still feeling anxious at times, ruminating about worries with regards to his wife  He is hoping that he might be able to contact her today and is trying to obtain a phone number from his cell phone to see if he can get in touch with her  He felt that anxiety was better managed with the higher dose of Prozac in the past and was amenable to an increase today  He is also attending groups and milieu activities more consistently and plans on working with a psychotherapist on the unit to utilize different ways to reduce anxiety symptoms  He states that he is sleeping fairly well at night, no major difficulties initiating or maintaining sleep although does awake a few times in the middle of the night    Feels as though his energy is appropriate and adequate " "today  Appetite levels have been stable and adequate  He adamantly denies any suicidal or homicidal ideations  Denies any auditory or visual hallucinations  Consulted with detox team regarding history of Sublocade use  He has not received this since 2/27/2023  They recommend he start Suboxone back at 8 mg twice daily and can eventually be transitioned to [facility] in the future  He remains motivated for inpatient drug/alcohol rehabilitation  Behavior over the last 24 hours:  unchanged  Sleep: normal  Appetite: normal  Medication side effects: No  ROS: no complaints and all other systems are negative    Mental Status Evaluation:  Appearance:  casually dressed   Behavior:  cooperative, calm   Speech:  normal rate and volume   Mood:  \"fine\"   Affect:  appropriate, reactive   Thought Process:  coherent, goal directed   Associations: intact associations   Thought Content:  no overt delusions   Perceptual Disturbances: no auditory hallucinations, no visual hallucinations, does not appear responding to internal stimuli   Risk Potential: Suicidal ideation - None at present  Homicidal ideation - None at present  Potential for aggression - No   Sensorium:  oriented to person, place and time/date   Memory:  recent and remote memory grossly intact   Consciousness:  alert and awake   Attention/Concentration: attention span and concentration are age appropriate   Insight:  fair   Judgment: fair   Gait/Station: normal gait/station   Motor Activity: no abnormal movements     Medications: all current active meds have been reviewed and continue current psychiatric medications    Current Facility-Administered Medications   Medication Dose Route Frequency Provider Last Rate   • aluminum-magnesium hydroxide-simethicone  30 mL Oral Q4H PRN Daniel Pan MD     • haloperidol lactate  2 5 mg Intramuscular Q6H PRN Max 4/day Daniel Pan MD      And   • LORazepam  1 mg Intramuscular Q6H PRN Max 4/day Daniel Pan MD  " And   • benztropine  0 5 mg Intramuscular Q6H PRN Max 4/day Tejal Mcbride MD     • haloperidol lactate  5 mg Intramuscular Q4H PRN Max 4/day Tejal Mcbride MD      And   • LORazepam  2 mg Intramuscular Q4H PRN Max 4/day Tejal Mcbride MD      And   • benztropine  1 mg Intramuscular Q4H PRN Max 4/day Tejal Mcbride MD     • benztropine  1 mg Intramuscular Q4H PRN Max 6/day Tejal Mcbride MD     • benztropine  1 mg Oral Q4H PRN Max 6/day Tejal Mcbride MD     • cholecalciferol  2,000 Units Oral Daily Hamzah Real PA-C     • hydrOXYzine HCL  50 mg Oral Q6H PRN Max 4/day Tejal Mcbride MD      Or   • diphenhydrAMINE  50 mg Intramuscular Q6H PRN Tejal Mcbride MD     • doxepin  10 mg Oral HS PRN Patsy Brooke MD     • FLUoxetine  20 mg Oral Daily Tejal Mcbride MD     • glycerin-hypromellose-  1 drop Both Eyes Q3H PRN Tejal Mcbride MD     • haloperidol  2 mg Oral Q4H PRN Max 6/day Tejal Mcbride MD     • haloperidol  5 mg Oral Q6H PRN Max 4/day Tejal Mcbride MD     • haloperidol  5 mg Oral Q4H PRN Max 4/day Tejal Mcbride MD     • hydrOXYzine HCL  100 mg Oral Q6H PRN Max 4/day Tejal Mcbride MD      Or   • LORazepam  2 mg Intramuscular Q6H PRN Tejal Mcbride MD     • hydrOXYzine HCL  25 mg Oral Q6H PRN Max 4/day Tejal Mcbride MD     • ibuprofen  400 mg Oral Q4H PRN Tejal Mcbride MD     • ibuprofen  600 mg Oral Q6H PRN Tejal Mcbride MD     • ibuprofen  800 mg Oral Q8H PRN Tejal Mcbride MD     • melatonin  3 mg Oral HS PRN Tejal Mcbride MD     • nicotine polacrilex  2 mg Oral Q2H PRN Tejal Mcbride MD     • propranolol  10 mg Oral Q8H PRN Tejal Mcbride MD     • QUEtiapine  200 mg Oral HS Ella Galeas MD     • senna-docusate sodium  1 tablet Oral Daily PRN Ella Galeas MD         Labs:  I have personally reviewed all pertinent laboratory/tests results  Most Recent Labs:     Results from the past 24 hours: No results found for this or any previous visit (from the past 24 hour(s))  Progress Toward Goals: progressing gradually    Risks / Benefits of Treatment:    Risks, benefits, and possible side effects of medications explained to patient and patient verbalizes understanding and agreement for treatment  Counseling / Coordination of Care: Total floor / unit time spent today 30 minutes  Greater than 50% of total time was spent with the patient and / or family counseling and / or coordination of care  A description of counseling / coordination of care:  Patient's progress discussed with staff in treatment team meeting  Treatment plan, treatment progress and medication changes were reviewed with nursing staff, pharmacy service, and case management in interdisciplinary treatment team meeting  Medications, treatment progress and treatment plan reviewed with patient  Recent stressors including drug and alcohol use problems discussed with patient  Educated on importance of medication and treatment compliance  Reassurance and supportive therapy provided  Encouraged participation in milieu and group therapy on the unit        Norm Becerril MD 05/10/23

## 2023-05-10 NOTE — NURSING NOTE
Pt denies SI/HI/AH/VH  Present in dayroom and milieu but mostly isolative to room  Pt continues to have c/o nausea and dizziness  VS WNL  Pt encouraged to rest  Pt preoccupied with getting Suboxone  Pt observed eating dinner and socializing with peers  No further complaints of nausea/vomiting  Pt did not appear in any distress  Will continue to monitor

## 2023-05-10 NOTE — PLAN OF CARE
Problem: SELF HARM/SUICIDALITY  Goal: Will have no self-injury during hospital stay  Description: INTERVENTIONS:  - Q 15 MINUTES: Routine safety checks  - Q WAKING SHIFT & PRN: Assess risk to determine if routine checks are adequate to maintain patient safety  - Encourage patient to participate actively in care by formulating a plan to combat response to suicidal ideation, identify supports and resources  Outcome: Progressing     Problem: DEPRESSION  Goal: Will be euthymic at discharge  Description: INTERVENTIONS:  - Administer medication as ordered  - Provide emotional support via 1:1 interaction with staff  - Encourage involvement in milieu/groups/activities  - Monitor for social isolation  Outcome: Progressing     Problem: BEHAVIOR  Goal: Pt/Family maintain appropriate behavior and adhere to behavioral management agreement, if implemented  Description: INTERVENTIONS:  - Assess the family dynamic   - Encourage verbalization of thoughts and concerns in a socially appropriate manner  - Assess patient/family's coping skills and non-compliant behavior (including use of illegal substances)  - Utilize positive, consistent limit setting strategies supporting safety of patient, staff and others  - Initiate consult with Case Management, Spiritual Care or other ancillary services as appropriate  - If a patient's/visitor's behavior jeopardizes the safety of the patient, staff, or others, refer to organization procedure     - Notify Security of behavior or suspected illegal substances which indicate the need for search of the patient and/or belongings  - Encourage participation in the decision making process about a behavioral management agreement; implement if patient meets criteria  Outcome: Progressing     Problem: SUBSTANCE USE/ABUSE  Goal: By discharge, will develop insight into their chemical dependency and sustain motivation to continue in recovery  Description: INTERVENTIONS:  - Attends all daily group sessions and scheduled AA groups  - Actively practices coping skills through participation in the therapeutic community and adherence to program rules  - Reviews and completes assignments from individual treatment plan  - Assist patient development of understanding of their personal cycle of addiction and relapse triggers  Outcome: Progressing     Problem: Nutrition/Hydration-ADULT  Goal: Nutrient/Hydration intake appropriate for improving, restoring or maintaining nutritional needs  Description: Monitor and assess patient's nutrition/hydration status for malnutrition  Collaborate with interdisciplinary team and initiate plan and interventions as ordered  Monitor patient's weight and dietary intake as ordered or per policy  Utilize nutrition screening tool and intervene as necessary  Determine patient's food preferences and provide high-protein, high-caloric foods as appropriate       INTERVENTIONS:  - Monitor oral intake, urinary output, labs, and treatment plans  - Assess nutrition and hydration status and recommend course of action  - Evaluate amount of meals eaten  - Assist patient with eating if necessary   - Allow adequate time for meals  - Recommend/ encourage appropriate diets, oral nutritional supplements, and vitamin/mineral supplements  - Order, calculate, and assess calorie counts as needed  - Recommend, monitor, and adjust tube feedings and TPN/PPN based on assessed needs  - Assess need for intravenous fluids  - Provide specific nutrition/hydration education as appropriate  - Include patient/family/caregiver in decisions related to nutrition  Outcome: Progressing     Problem: SAFETY, RESTRAINT - VIOLENT/SELF-DESTRUCTIVE  Goal: Remains free of harm/injury from restraints (Restraint for Violent/Self-Destructive Behavior)  Description: INTERVENTIONS:  - Instruct patient/family regarding restraint use   - Assess and monitor physiologic and psychological status   - Provide interventions and comfort measures to meet assessed patient needs   - Ensure continuous in person monitoring is provided   - Identify and implement measures to help patient regain control  - Assess readiness for release of restraint  Outcome: Progressing  Goal: Returns to optimal restraint-free functioning  Description: INTERVENTIONS:  - Assess the patient's behavior and symptoms that indicate continued need for restraint  - Identify and implement measures to help patient regain control  - Assess readiness for release of restraint   Outcome: Progressing

## 2023-05-10 NOTE — NURSING NOTE
"Pt denies SI/HI/AH/VH  Present in dayroom and milieu  Social with peers  Medication and meal compliant  Pt appears anxious at times but was calm and pleasant during assessment  Prior to lunch Pt reported vomiting x 1 and nausea x 3 days due to \" detoxing from Suboxone\"  Large amount of liquid emesis observed in  [facility] paper bag  VS WNL  Discussed with medical   1249 PRN Zofran 4 mg PO given  Per psychiatry Subxone 8 mg BID film will be ordered if he is ok with getting the film over the injection that he was taking previously  80 Pt states Pt states he was previously taking Klonopin for anxiety  Pt asks that this be passed along to the Dr to see if it can be ordered  Will continue to monitor     "

## 2023-05-11 RX ORDER — BUPRENORPHINE AND NALOXONE 8; 2 MG/1; MG/1
8 FILM, SOLUBLE BUCCAL; SUBLINGUAL 2 TIMES DAILY
Status: DISCONTINUED | OUTPATIENT
Start: 2023-05-11 | End: 2023-05-12 | Stop reason: HOSPADM

## 2023-05-11 RX ADMIN — BUPRENORPHINE AND NALOXONE 8 MG: 8; 2 FILM BUCCAL; SUBLINGUAL at 17:41

## 2023-05-11 RX ADMIN — CHOLECALCIFEROL TAB 25 MCG (1000 UNIT) 2000 UNITS: 25 TAB at 08:52

## 2023-05-11 RX ADMIN — QUETIAPINE FUMARATE 200 MG: 100 TABLET ORAL at 21:00

## 2023-05-11 RX ADMIN — BUPRENORPHINE AND NALOXONE 8 MG: 8; 2 FILM BUCCAL; SUBLINGUAL at 08:52

## 2023-05-11 RX ADMIN — FLUOXETINE 40 MG: 20 CAPSULE ORAL at 08:53

## 2023-05-11 RX ADMIN — SENNOSIDES AND DOCUSATE SODIUM 1 TABLET: 8.6; 5 TABLET ORAL at 15:11

## 2023-05-11 NOTE — PROGRESS NOTES
05/11/23 0859   Team Meeting   Meeting Type Daily Rounds   Team Members Present   Team Members Present Physician;Nurse;   Physician Team Member Inocencio   Nursing Team Member Waylon   Care Management Team Member Atif   Patient/Family Present   Patient Present No   Patient's Family Present No   Pt med/meal compliant  Stating he still feels depressed  Discharge pending Rehab placement

## 2023-05-11 NOTE — NURSING NOTE
Patient is in the community and social with peers  Patient remained in behavioral control  Patient did not have any further complaints of nausea and vomiting  Patient was compliant with scheduled medications  Patient requested for 2200 suboxone dose be changed to 1800  Patient denied SI/AVH at this time  Staff to maintain continuous rounding for safety and support

## 2023-05-11 NOTE — PROGRESS NOTES
"   05/11/23 1000   Activity/Group Checklist   Group Other (Comment)  (Group Art Therapy/Psychodynamic, Creatively Exploring \"Expression\" over \"Statement\")   Attendance Attended   Attendance Duration (min) 16-30  (Patient arrive to group late)   Interactions Interacted appropriately   Affect/Mood Appropriate   Goals Achieved Able to listen to others; Able to engage in interactions  (Patient observed only)       "

## 2023-05-11 NOTE — PLAN OF CARE
Problem: SELF HARM/SUICIDALITY  Goal: Will have no self-injury during hospital stay  Description: INTERVENTIONS:  - Q 15 MINUTES: Routine safety checks  - Q WAKING SHIFT & PRN: Assess risk to determine if routine checks are adequate to maintain patient safety  - Encourage patient to participate actively in care by formulating a plan to combat response to suicidal ideation, identify supports and resources  Outcome: Progressing     Problem: DEPRESSION  Goal: Will be euthymic at discharge  Description: INTERVENTIONS:  - Administer medication as ordered  - Provide emotional support via 1:1 interaction with staff  - Encourage involvement in milieu/groups/activities  - Monitor for social isolation  Outcome: Progressing     Problem: BEHAVIOR  Goal: Pt/Family maintain appropriate behavior and adhere to behavioral management agreement, if implemented  Description: INTERVENTIONS:  - Assess the family dynamic   - Encourage verbalization of thoughts and concerns in a socially appropriate manner  - Assess patient/family's coping skills and non-compliant behavior (including use of illegal substances)  - Utilize positive, consistent limit setting strategies supporting safety of patient, staff and others  - Initiate consult with Case Management, Spiritual Care or other ancillary services as appropriate  - If a patient's/visitor's behavior jeopardizes the safety of the patient, staff, or others, refer to organization procedure     - Notify Security of behavior or suspected illegal substances which indicate the need for search of the patient and/or belongings  - Encourage participation in the decision making process about a behavioral management agreement; implement if patient meets criteria  Outcome: Progressing     Problem: SUBSTANCE USE/ABUSE  Goal: By discharge, will develop insight into their chemical dependency and sustain motivation to continue in recovery  Description: INTERVENTIONS:  - Attends all daily group sessions and scheduled AA groups  - Actively practices coping skills through participation in the therapeutic community and adherence to program rules  - Reviews and completes assignments from individual treatment plan  - Assist patient development of understanding of their personal cycle of addiction and relapse triggers  Outcome: Progressing     Problem: DISCHARGE PLANNING - CARE MANAGEMENT  Goal: Discharge to post-acute care or home with appropriate resources  Description: INTERVENTIONS:  - Conduct assessment to determine patient/family and health care team treatment goals, and need for post-acute services based on payer coverage, community resources, and patient preferences, and barriers to discharge  - Address psychosocial, clinical, and financial barriers to discharge as identified in assessment in conjunction with the patient/family and health care team  - Arrange appropriate level of post-acute services according to patient’s   needs and preference and payer coverage in collaboration with the physician and health care team  - Communicate with and update the patient/family, physician, and health care team regarding progress on the discharge plan  - Arrange appropriate transportation to post-acute venues  Outcome: Progressing     Problem: Nutrition/Hydration-ADULT  Goal: Nutrient/Hydration intake appropriate for improving, restoring or maintaining nutritional needs  Description: Monitor and assess patient's nutrition/hydration status for malnutrition  Collaborate with interdisciplinary team and initiate plan and interventions as ordered  Monitor patient's weight and dietary intake as ordered or per policy  Utilize nutrition screening tool and intervene as necessary  Determine patient's food preferences and provide high-protein, high-caloric foods as appropriate       INTERVENTIONS:  - Monitor oral intake, urinary output, labs, and treatment plans  - Assess nutrition and hydration status and recommend course of action  - Evaluate amount of meals eaten  - Assist patient with eating if necessary   - Allow adequate time for meals  - Recommend/ encourage appropriate diets, oral nutritional supplements, and vitamin/mineral supplements  - Order, calculate, and assess calorie counts as needed  - Recommend, monitor, and adjust tube feedings and TPN/PPN based on assessed needs  - Assess need for intravenous fluids  - Provide specific nutrition/hydration education as appropriate  - Include patient/family/caregiver in decisions related to nutrition  Outcome: Progressing     Problem: SAFETY, RESTRAINT - VIOLENT/SELF-DESTRUCTIVE  Goal: Remains free of harm/injury from restraints (Restraint for Violent/Self-Destructive Behavior)  Description: INTERVENTIONS:  - Instruct patient/family regarding restraint use   - Assess and monitor physiologic and psychological status   - Provide interventions and comfort measures to meet assessed patient needs   - Ensure continuous in person monitoring is provided   - Identify and implement measures to help patient regain control  - Assess readiness for release of restraint  Outcome: Progressing  Goal: Returns to optimal restraint-free functioning  Description: INTERVENTIONS:  - Assess the patient's behavior and symptoms that indicate continued need for restraint  - Identify and implement measures to help patient regain control  - Assess readiness for release of restraint   Outcome: Progressing     Problem: Ineffective Coping  Goal: Participates in unit activities  Description: Interventions:  - Provide therapeutic environment   - Provide required programming   - Redirect inappropriate behaviors   Outcome: Progressing

## 2023-05-11 NOTE — PROGRESS NOTES
[facility] Group Note     05/11/23 1400   Activity/Group Checklist   Group Personal control  (Music and Lyrics)   Attendance Attended   Attendance Duration (min) 0-15  (left group early)   Interactions Interacted appropriately   Affect/Mood Calm   Goals Achieved Discussed coping strategies; Able to listen to others; Able to engage in interactions; Able to reflect/comment on own behavior;Able to recieve feedback; Able to give feedback to another

## 2023-05-11 NOTE — PROGRESS NOTES
05/11/23 1300   Activity/Group Checklist   Group   (recovery group)   Attendance Attended   Attendance Duration (min) 16-30   Interactions Interacted appropriately   Affect/Mood Appropriate   Goals Achieved Discussed coping strategies; Discussed self-esteem issues; Able to listen to others; Able to engage in interactions
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
## 2023-05-11 NOTE — NURSING NOTE
Pt calm and cooperative with an anxious and depressed affect  Reports mild anxiety and moderate depression  Denies Si/HI/AVH  Socializing with peers throughout the morning  No requests at this time
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
## 2023-05-11 NOTE — NURSING NOTE
Pt refused to take a snack, later requesting a different snack  When told about current snack options, pt became angry saying there are more snacks available  Pt then started asking other staff members for snacks   Pt offered snack, became agitated and called staff kim

## 2023-05-11 NOTE — PROGRESS NOTES
"Progress Note - [address]  50 y o  male MRN: [id number]  Unit/Bed#: U 350-02 Encounter: [id number]    Assessment/Plan   Principal Problem:    Mood disorder ([facility] 75 )  Active Problems:    Hepatitis C virus infection    Gastroesophageal reflux disease    Medical clearance for psychiatric admission    Severe stimulant use disorder ([facility] 75 )    Opioid use disorder in remission    Dysphagia    Recommended Treatment:   Prozac 40 mg daily for depression  Seroquel 200mg daily for depression and mood  Suboxone 8-2mg BID for OUD    All current active medications have been reviewed  Encourage group therapy, milieu therapy and occupational therapy  Behavioral Health checks every 7 minutes  Medical management per SL  Legal Status: 201  ----------------------------------------      Subjective: Patient discussed in nursing report and overnight notes and charting reviewed  Per nursing report, he is reported to be socializing with peers  Appropriate in the milieu  Attending groups  Requested if Suboxone could be dosed earlier in the evening closer to dinnertime  He states today that his mood is \"pretty good\"  He is brighter in affect, more talkative  He is pleasant and cooperative  Continues to report some sadness and frustration regarding the relationship with his ex-wife although he is hoping that he will be able to rekindle this and reconnect with her  He is motivated for rehab, feels that focusing on himself will allow him to better repair this relationship in the future  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 [employer] and is hopeful he may be able to return to this at some point  He is tolerating the current medications well, feels that the Suboxone tended to work better when taking in the early evening as opposed to late before bedtime  We will adjust accordingly  He otherwise reports that he is doing fairly well and has no major concerns at this time    Reports that he " "is sleeping better, appetite levels have been adequate and stable  Energy levels are appropriate  No suicidal or homicidal ideations endorsed  No auditory or visual hallucinations  Behavior over the last 24 hours:  unchanged  Sleep: normal  Appetite: normal  Medication side effects: No  ROS: no complaints and all other systems are negative    Mental Status Evaluation:  Appearance:  adequate grooming, dressed in hospital attire   Behavior:  cooperative, calm   Speech:  normal rate and volume   Mood:  \"good\"   Affect:  brighter   Thought Process:  goal directed   Associations: intact associations   Thought Content:  no overt delusions, ruminations   Perceptual Disturbances: no auditory hallucinations, no visual hallucinations, does not appear responding to internal stimuli   Risk Potential: Suicidal ideation - None at present  Homicidal ideation - None at present  Potential for aggression - No   Sensorium:  oriented to person, place and time/date   Memory:  recent and remote memory grossly intact   Consciousness:  alert and awake   Attention/Concentration: attention span and concentration are age appropriate   Insight:  fair   Judgment: fair   Gait/Station: normal gait/station   Motor Activity: no abnormal movements     Medications: all current active meds have been reviewed    Current Facility-Administered Medications   Medication Dose Route Frequency Provider Last Rate   • aluminum-magnesium hydroxide-simethicone  30 mL Oral Q4H PRN Leidy Adames MD     • haloperidol lactate  2 5 mg Intramuscular Q6H PRN Max 4/day Leidy Adames MD      And   • LORazepam  1 mg Intramuscular Q6H PRN Max 4/day Leidy Adames MD      And   • benztropine  0 5 mg Intramuscular Q6H PRN Max 4/day Leidy Adames MD     • haloperidol lactate  5 mg Intramuscular Q4H PRN Max 4/day Leidy Adames MD      And   • LORazepam  2 mg Intramuscular Q4H PRN Max 4/day Leidy Adames MD      And   • benztropine  1 mg Intramuscular " Q4H PRN Max 4/day Aleta Spann MD     • benztropine  1 mg Intramuscular Q4H PRN Max 6/day Aleta Spann MD     • benztropine  1 mg Oral Q4H PRN Max 6/day Aleta Spann MD     • buprenorphine-naloxone  8 mg Sublingual BID Shara Saavedra MD     • cholecalciferol  2,000 Units Oral Daily Guilherme Dent PA-C     • hydrOXYzine HCL  50 mg Oral Q6H PRN Max 4/day Aleta Spann MD      Or   • diphenhydrAMINE  50 mg Intramuscular Q6H PRN Aleta Spann MD     • doxepin  10 mg Oral HS PRN Shara Saavedra MD     • FLUoxetine  40 mg Oral Daily Shara Saavedra MD     • glycerin-hypromellose-  1 drop Both Eyes Q3H PRN Aleta Spann MD     • haloperidol  2 mg Oral Q4H PRN Max 6/day Aleta Spann MD     • haloperidol  5 mg Oral Q6H PRN Max 4/day Aleta Spann MD     • haloperidol  5 mg Oral Q4H PRN Max 4/day Aleta Spann MD     • hydrOXYzine HCL  100 mg Oral Q6H PRN Max 4/day Aleta Spann MD      Or   • LORazepam  2 mg Intramuscular Q6H PRN Aleta Spann MD     • hydrOXYzine HCL  25 mg Oral Q6H PRN Max 4/day Aleta Spann MD     • ibuprofen  400 mg Oral Q4H PRN Aleta Spann MD     • ibuprofen  600 mg Oral Q6H PRN Aleta Spann MD     • ibuprofen  800 mg Oral Q8H PRN Aleta Spann MD     • melatonin  3 mg Oral HS PRN Aleta Spann MD     • nicotine polacrilex  2 mg Oral Q2H PRN Aleta Spann MD     • ondansetron  4 mg Oral Q6H PRN Guilherme Dent PA-C     • propranolol  10 mg Oral Q8H PRN Aleta Spann MD     • QUEtiapine  200 mg Oral HS Ella Sotelo MD     • senna-docusate sodium  1 tablet Oral Daily PRN Ella Sotelo MD         Labs:  I have personally reviewed all pertinent laboratory/tests results  Most Recent Labs:     Most Recent Labs:   Lab Results   Component Value Date    WBC 5 64 05/03/2023    RBC 4 27 05/03/2023    HGB 13 2 05/03/2023    HCT 39 8 05/03/2023     05/03/2023    RDW 16 2 (H) 05/03/2023    NEUTROABS 3 65 05/03/2023 SODIUM 146 05/03/2023    K 4 6 05/03/2023     05/03/2023    CO2 35 (H) 05/03/2023    BUN 10 05/03/2023    CREATININE 0 93 05/03/2023    GLUC 87 05/03/2023    CALCIUM 9 3 05/03/2023    AST 27 05/03/2023    ALT 25 05/03/2023    ALKPHOS 79 05/03/2023    TP 6 0 (L) 05/03/2023    ALB 3 5 05/03/2023    TBILI 0 72 05/03/2023    CHOLESTEROL 112 05/03/2023    HDL 51 05/03/2023    TRIG 60 05/03/2023    LDLCALC 49 05/03/2023    NONHDLC 61 05/03/2023    AMMONIA 38 (H) 04/06/2014    FOG4VWOBYUYQ 0 691 05/03/2023    FREET4 0 82 10/20/2015    T3FREE 2 22 (L) 10/20/2015    RPR Non-Reactive 03/17/2016    HGBA1C 4 3 04/08/2023    EAG 77 04/08/2023       Progress Toward Goals: progressing gradually    Risks / Benefits of Treatment:    Risks, benefits, and possible side effects of medications explained to patient and patient verbalizes understanding and agreement for treatment  Counseling / Coordination of Care: Total floor / unit time spent today 35 minutes  Greater than 50% of total time was spent with the patient and / or family counseling and / or coordination of care  A description of counseling / coordination of care:  Patient's progress discussed with staff in treatment team meeting  Treatment plan, treatment progress and medication changes were reviewed with nursing staff, pharmacy service, and case management in interdisciplinary treatment team meeting  Medications, treatment progress and treatment plan reviewed with patient  Recent stressors including marital problems; substance use discussed with patient  Educated on importance of medication and treatment compliance  Reassurance and supportive therapy provided  Encouraged participation in milieu and group therapy on the unit        Wei Ríos MD 05/11/23

## 2023-05-12 VITALS
RESPIRATION RATE: 17 BRPM | OXYGEN SATURATION: 99 % | SYSTOLIC BLOOD PRESSURE: 146 MMHG | HEIGHT: 66 IN | DIASTOLIC BLOOD PRESSURE: 73 MMHG | TEMPERATURE: 98.7 F | WEIGHT: 138.2 LBS | BODY MASS INDEX: 22.21 KG/M2 | HEART RATE: 76 BPM

## 2023-05-12 PROBLEM — Z00.8 MEDICAL CLEARANCE FOR PSYCHIATRIC ADMISSION: Status: RESOLVED | Noted: 2023-04-07 | Resolved: 2023-05-12

## 2023-05-12 RX ORDER — BUPRENORPHINE AND NALOXONE 8; 2 MG/1; MG/1
8 FILM, SOLUBLE BUCCAL; SUBLINGUAL 2 TIMES DAILY
Qty: 60 FILM | Refills: 0
Start: 2023-05-12 | End: 2023-06-11

## 2023-05-12 RX ORDER — FLUOXETINE HYDROCHLORIDE 40 MG/1
40 CAPSULE ORAL DAILY
Refills: 0
Start: 2023-05-13 | End: 2023-06-12

## 2023-05-12 RX ORDER — DOXEPIN HYDROCHLORIDE 10 MG/1
10 CAPSULE ORAL
Refills: 0
Start: 2023-05-12

## 2023-05-12 RX ORDER — MELATONIN
2000 DAILY
Refills: 0
Start: 2023-05-13

## 2023-05-12 RX ADMIN — CHOLECALCIFEROL TAB 25 MCG (1000 UNIT) 2000 UNITS: 25 TAB at 08:43

## 2023-05-12 RX ADMIN — FLUOXETINE 40 MG: 20 CAPSULE ORAL at 08:43

## 2023-05-12 RX ADMIN — BUPRENORPHINE AND NALOXONE 8 MG: 8; 2 FILM BUCCAL; SUBLINGUAL at 09:04

## 2023-05-12 NOTE — NURSING NOTE
Pt was calm and cooperative, Pt denied all pain anxiety and depression,  Pt denies SI and HI, Pt denies AH and VH, Pt seen in dayroom  And lounge, Pt took all HS medications,  Pt shaved head during shift with tech, Q7 min safety checks maintained

## 2023-05-12 NOTE — SOCIAL WORK
SW spoke with [facility] and [facility]  Pt approved for 14 days per Sd Gee #[id number]
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
## 2023-05-12 NOTE — NURSING NOTE
"Pt mood has been labile throughout the morning  He voices frustration regarding upcoming discharge and not having any clothing  Pt observed sitting in pt common area with colored pencils from art therapist that he failed to return after group yesterday  Pt states \"I was going to give them back later today  \" Pt educated on unit rules and expectations  Pt increasingly irritable during conversation  He continues to endorse depression and anxiety but states his symptoms are slightly improving  He denies any issues with sleep or appetite  Pt is med and meal compliant  He denies any SI/HI/AH/VH  Staff availability reinforced    "

## 2023-05-12 NOTE — DISCHARGE INSTR - OTHER ORDERS
CRISIS INFORMATION  If you are experiencing a mental health emergency, you may call the [address] [facility] 24 hours a day, 7 days per week at [phone]  In [place], call [phone]  When you need someone to listen, the Alina Mings is available for 16 hours a day, 7 days a week, from the time of 7-10am and 2pm-2am   It is not available from the hours of 2am-6am and 10am-2pm  A representative can be reached at [phone]  HOW TO GET SUBSTANCE ABUSE HELP:  If you or someone you know has a drug or alcohol problem, there is help:  [place] [address]: [address]: [phone]  An assessment is the first step  In addition to those listed there are other programs available in the area but assessment is best to determine an appropriate level of care  If you DO NOT have Medical Assistance ([place]) or [employer], an assessment can be scheduled at one of these providers:  [address] [facility], [address]  [phone]   [facility]  [address] , [place], [address]  [facility] 84  100 [facility]  R [facility] 70  7[address] [facility], 105 [facility]   [address] , [place], 98 [facility]  [address] [facility] , [place], 98 [facility]  812 [facility] , [address], [place], [address]  [phone]     If you [address], an assessment can be scheduled at one of these providers:  [facility]  [address] , [place], 98 [facility]  [address]  [facility] [place] [address], [address]  [address]  D&A Intake Unit  [address] [facility]  [address] , 1st Floor, [place], [facility] N [facility]  [phone] [address], [address], [place], [address] [facility] [phone]   [facility]  [address] , [place], [address]  [phone]   2600 [facility]  [address]  [facility]  [phone]   NET ([facility])  [address], [address]  197 [facility]  2 [facility], 105 [facility]   [address] , [place], 98 [facility]  [address] [place] , [place], 98 [facility]  [address] , [address], [place], [address]  [phone]     If you [address] [place] [address], an assessment can be scheduled at one of these providers  Please contact these Providers to determine if they are in your network plan:  [facility] D&A Intake Unit  620 [facility] [address] , 1st Floor, [place] [address]  [address]  [address] , [place], [address]  [phone]   2600 [facility]  [address]  [facility]  [phone]   NET ([facility])  [address], [address]  197 [facility]  1755 [facility] [address] [facility]  [address] [place] [address] , [address], [place], [address] [facility]   \

## 2023-05-12 NOTE — NURSING NOTE
AVS has been reviewed with pt  All questions and concerns have been addressed  Pt denies any SI/HI/AH/VH and feels safe enough for discharge at this time  All belongings have been returned to pt  Pt walked off unit with BHT to [place] for dropoff at [employer]

## 2023-05-12 NOTE — DISCHARGE INSTR - APPOINTMENTS
Radha Lyons or Susie, our Ingrid and Yasmeen, will be calling you after your discharge, on the phone number that you provided  They will be available as an additional support, if needed  If you wish to speak with one of them, you may contact Tu Moralez at [phone] or Dennie Dress at [phone]
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
## 2023-05-12 NOTE — PLAN OF CARE
Problem: SELF HARM/SUICIDALITY  Goal: Will have no self-injury during hospital stay  Description: INTERVENTIONS:  - Q 15 MINUTES: Routine safety checks  - Q WAKING SHIFT & PRN: Assess risk to determine if routine checks are adequate to maintain patient safety  - Encourage patient to participate actively in care by formulating a plan to combat response to suicidal ideation, identify supports and resources  5/12/2023 1202 by Angel Hutchinson, RN  Outcome: Completed  5/12/2023 1202 by Angel Hutchinson, RN  Outcome: Adequate for Discharge  5/12/2023 0920 by Angel Hutchinson, RN  Outcome: Progressing     Problem: DEPRESSION  Goal: Will be euthymic at discharge  Description: INTERVENTIONS:  - Administer medication as ordered  - Provide emotional support via 1:1 interaction with staff  - Encourage involvement in milieu/groups/activities  - Monitor for social isolation  5/12/2023 1202 by Angel Hutchinson, RN  Outcome: Completed  5/12/2023 1202 by Angel Hutchinson, RN  Outcome: Adequate for Discharge  5/12/2023 0920 by Angel Hutchinson, RN  Outcome: Progressing     Problem: BEHAVIOR  Goal: Pt/Family maintain appropriate behavior and adhere to behavioral management agreement, if implemented  Description: INTERVENTIONS:  - Assess the family dynamic   - Encourage verbalization of thoughts and concerns in a socially appropriate manner  - Assess patient/family's coping skills and non-compliant behavior (including use of illegal substances)  - Utilize positive, consistent limit setting strategies supporting safety of patient, staff and others  - Initiate consult with Case Management, Spiritual Care or other ancillary services as appropriate  - If a patient's/visitor's behavior jeopardizes the safety of the patient, staff, or others, refer to organization procedure     - Notify Security of behavior or suspected illegal substances which indicate the need for search of the patient and/or belongings  - Encourage participation in the decision making process about a behavioral management agreement; implement if patient meets criteria  5/12/2023 1202 by Tanmay Hylton RN  Outcome: Completed  5/12/2023 1202 by Tanmay Hylton RN  Outcome: Adequate for Discharge  5/12/2023 0920 by Tanmay Hylton RN  Outcome: Progressing     Problem: SUBSTANCE USE/ABUSE  Goal: By discharge, will develop insight into their chemical dependency and sustain motivation to continue in recovery  Description: INTERVENTIONS:  - Attends all daily group sessions and scheduled AA groups  - Actively practices coping skills through participation in the therapeutic community and adherence to program rules  - Reviews and completes assignments from individual treatment plan  - Assist patient development of understanding of their personal cycle of addiction and relapse triggers  5/12/2023 1202 by Tanmay Hylton RN  Outcome: Completed  5/12/2023 1202 by Tanmay Hylton RN  Outcome: Adequate for Discharge  5/12/2023 0920 by Tanmay Hylton RN  Outcome: Progressing     Problem: DISCHARGE PLANNING - CARE MANAGEMENT  Goal: Discharge to post-acute care or home with appropriate resources  Description: INTERVENTIONS:  - Conduct assessment to determine patient/family and health care team treatment goals, and need for post-acute services based on payer coverage, community resources, and patient preferences, and barriers to discharge  - Address psychosocial, clinical, and financial barriers to discharge as identified in assessment in conjunction with the patient/family and health care team  - Arrange appropriate level of post-acute services according to patient’s   needs and preference and payer coverage in collaboration with the physician and health care team  - Communicate with and update the patient/family, physician, and health care team regarding progress on the discharge plan  - Arrange appropriate transportation to post-acute venues  5/12/2023 1202 by Porsche Chu RN  Outcome: Completed  5/12/2023 1202 by Porsche Chu RN  Outcome: Adequate for Discharge  5/12/2023 0920 by Porsche Chu RN  Outcome: Progressing     Problem: Nutrition/Hydration-ADULT  Goal: Nutrient/Hydration intake appropriate for improving, restoring or maintaining nutritional needs  Description: Monitor and assess patient's nutrition/hydration status for malnutrition  Collaborate with interdisciplinary team and initiate plan and interventions as ordered  Monitor patient's weight and dietary intake as ordered or per policy  Utilize nutrition screening tool and intervene as necessary  Determine patient's food preferences and provide high-protein, high-caloric foods as appropriate       INTERVENTIONS:  - Monitor oral intake, urinary output, labs, and treatment plans  - Assess nutrition and hydration status and recommend course of action  - Evaluate amount of meals eaten  - Assist patient with eating if necessary   - Allow adequate time for meals  - Recommend/ encourage appropriate diets, oral nutritional supplements, and vitamin/mineral supplements  - Order, calculate, and assess calorie counts as needed  - Recommend, monitor, and adjust tube feedings and TPN/PPN based on assessed needs  - Assess need for intravenous fluids  - Provide specific nutrition/hydration education as appropriate  - Include patient/family/caregiver in decisions related to nutrition  5/12/2023 1202 by Porsche Chu RN  Outcome: Completed  5/12/2023 1202 by Porsche Chu RN  Outcome: Adequate for Discharge  5/12/2023 0920 by Porsche Chu RN  Outcome: Progressing     Problem: SAFETY, RESTRAINT - VIOLENT/SELF-DESTRUCTIVE  Goal: Remains free of harm/injury from restraints (Restraint for Violent/Self-Destructive Behavior)  Description: INTERVENTIONS:  - Instruct patient/family regarding restraint use   - Assess and monitor physiologic and psychological status   - Provide interventions and comfort measures to meet assessed patient needs   - Ensure continuous in person monitoring is provided   - Identify and implement measures to help patient regain control  - Assess readiness for release of restraint  5/12/2023 1202 by Deion Brown RN  Outcome: Completed  5/12/2023 1202 by Deion Brown RN  Outcome: Adequate for Discharge  5/12/2023 0920 by Deion Brown RN  Outcome: Progressing  Goal: Returns to optimal restraint-free functioning  Description: INTERVENTIONS:  - Assess the patient's behavior and symptoms that indicate continued need for restraint  - Identify and implement measures to help patient regain control  - Assess readiness for release of restraint   5/12/2023 1202 by Deion Brown RN  Outcome: Completed  5/12/2023 1202 by Deion Brown RN  Outcome: Adequate for Discharge  5/12/2023 0920 by Deion Brown RN  Outcome: Progressing     Problem: Ineffective Coping  Goal: Participates in unit activities  Description: Interventions:  - Provide therapeutic environment   - Provide required programming   - Redirect inappropriate behaviors   5/12/2023 1202 by Deion Brown RN  Outcome: Completed  5/12/2023 1202 by Deion Brown RN  Outcome: Adequate for Discharge  5/12/2023 0920 by Deion Brown RN  Outcome: Progressing

## 2023-05-12 NOTE — BH TRANSITION RECORD
Contact Information: If you have any questions, concerns, pended studies, tests and/or procedures, or emergencies regarding your inpatient behavioral health visit  Please contact [address] [place] [address] behavioral health unit 3B [phone]  and ask to speak to a , nurse or physician  A contact is available 24 hours/ 7 days a week at this number  Summary of Procedures Performed During your Stay:  Below is a list of major procedures performed during your hospital stay and a summary of results:  - Cardiac Procedures/Studies: ECG  Normal sinus rhythm  Voltage criteria for left ventricular hypertrophy  Abnormal ECG  When compared with ECG of 04-MAY-2023 17:09, (unconfirmed)  No significant change was found  Confirmed by Guicho Booth ([id number]) on 5/5/2023 5:26:16 AM    Pending Studies (From admission, onward)    None        Please follow up on the above pending studies with your PCP and/or referring provider

## 2024-05-31 ENCOUNTER — OFFICE VISIT (OUTPATIENT)
Dept: GASTROENTEROLOGY | Facility: MEDICAL CENTER | Age: 54
End: 2024-05-31
Payer: OTHER GOVERNMENT

## 2024-05-31 ENCOUNTER — TELEPHONE (OUTPATIENT)
Age: 54
End: 2024-05-31

## 2024-05-31 ENCOUNTER — TELEPHONE (OUTPATIENT)
Dept: GASTROENTEROLOGY | Facility: MEDICAL CENTER | Age: 54
End: 2024-05-31

## 2024-05-31 VITALS
SYSTOLIC BLOOD PRESSURE: 142 MMHG | WEIGHT: 140.8 LBS | TEMPERATURE: 97.2 F | OXYGEN SATURATION: 99 % | BODY MASS INDEX: 22.63 KG/M2 | HEIGHT: 66 IN | DIASTOLIC BLOOD PRESSURE: 84 MMHG | HEART RATE: 81 BPM

## 2024-05-31 DIAGNOSIS — R63.4 WEIGHT LOSS, UNINTENTIONAL: ICD-10-CM

## 2024-05-31 DIAGNOSIS — R13.10 DYSPHAGIA, UNSPECIFIED TYPE: Primary | ICD-10-CM

## 2024-05-31 PROCEDURE — 99204 OFFICE O/P NEW MOD 45 MIN: CPT | Performed by: INTERNAL MEDICINE

## 2024-05-31 NOTE — TELEPHONE ENCOUNTER
Patients GI provider:  Dr. Jaiyeola    Number to return call: [phone]    Reason for call: Stacey from [facility] called to reschedule pt's EGD due to transportation.    Scheduled procedure/appointment date if applicable: N/A

## 2024-05-31 NOTE — PROGRESS NOTES
"[facility] - Outpatient Consultation  Perez Stack Jr. 53 y.o. male MRN: [id number]  Encounter: [id number]          ASSESSMENT AND PLAN:  53-year-old male with history of gastroesophageal reflux, prior opioid use disorder in remission who presents for evaluation.    1. Dysphagia, unspecified type  2. Weight loss, unintentional  He reports chronic history of solid food dysphagia and now a 22 pound unintentional weight loss over the last 6 months.  Etiology for his dysphagia includes mechanical obstruction related to esophageal stricture, EOE, esophagitis or esophageal web, versus underlying motility disorder.  I recommend scheduling diagnostic EGD for further evaluation.  I obtained informed consent from the patient. The risks/benefits/alternatives of the procedure were discussed with the patient. Risks included, but not limited to, infection, bleeding, perforation, injury to organs in the abdomen, missed lesion and incomplete procedure were discussed. Patient was agreeable and electronic signature was obtained.    - EGD; Future    He is also due for colonoscopy for colon cancer screening.  Given his dysphagia he is unlikely to tolerate a bowel preparation at this time.  Once his upper GI symptoms are improved he will follow-up to schedule screening colonoscopy.      ______________________________________________________________________    HPI: 53-year-old male with history of gastroesophageal reflux, prior opioid use disorder in remission who presents for evaluation.    He reports 2-year history of solid food dysphagia.  The dysphagia usually occurs with foods like meats or breads.  He has a sensation like the food is \"stuck\" in his substernal him and usually has to regurgitate the food or drink water to allow it to pass.  He denies chronic symptoms of gastroesophageal reflux and has not been on acid suppression medications in the past.  Leaves he was seen by ENT in the past and had a " office laryngoscopy but no EGD in the past.  He reports low appetite and unintentional weight loss of 22 pounds in the last 6 and half months due to his upper GI symptoms.  He otherwise is without GI complaints and denies abdominal pain, nausea or vomiting.  He has regular, formed bowel movements without melena or hematochezia.    He reports no family history of gastrointestinal disease including colorectal cancer  He has no prior colonoscopy  He has no prior GI surgical history  He takes no antiplatelet or anticoagulant medication      5/2023 hemoglobin 13.2, platelets 239, liver enzymes are within normal limits  He has no recent abdominal imaging available for review      REVIEW OF SYSTEMS:    CONSTITUTIONAL: Denies any fever, chills, rigors, and weight loss.  HEENT: No earache or tinnitus. Denies hearing loss or visual disturbances.  CARDIOVASCULAR: No chest pain or palpitations.   RESPIRATORY: Denies any cough, hemoptysis, shortness of breath or dyspnea on exertion.  GASTROINTESTINAL: As noted in the History of Present Illness.   GENITOURINARY: No problems with urination. Denies any hematuria or dysuria.  NEUROLOGIC: No dizziness or vertigo, denies headaches.   MUSCULOSKELETAL: Denies any muscle or joint pain.   SKIN: Denies skin rashes or itching.   ENDOCRINE: Denies excessive thirst. Denies intolerance to heat or cold.  PSYCHOSOCIAL: Denies depression or anxiety. Denies any recent memory loss.       Historical Information   Past Medical History:   Diagnosis Date    Anxiety     Bipolar I disorder, most recent episode depressed, severe with psychotic features (HCC) 2/23/2016    Depression     Foot infection     Hepatitis C virus infection 02/23/2016    has had treatment    Psychiatric illness     Self-injurious behavior     Substance abuse (HCC)     Suicide attempt (HCC)     Withdrawal symptoms, drug or narcotic (HCC)      History reviewed. No pertinent surgical history.  Social History   Social History  "    Substance and Sexual Activity   Alcohol Use No     Social History     Substance and Sexual Activity   Drug Use Yes    Types: Cocaine, \"Crack\" cocaine, Methamphetamines     Social History     Tobacco Use   Smoking Status Former    Types: Cigarettes   Smokeless Tobacco Former     Family History   Problem Relation Age of Onset    No Known Problems Mother     No Known Problems Father     No Known Problems Sister     No Known Problems Brother     No Known Problems Maternal Aunt     No Known Problems Paternal Aunt     No Known Problems Maternal Uncle     No Known Problems Paternal Uncle     No Known Problems Maternal Grandfather     No Known Problems Maternal Grandmother     No Known Problems Paternal Grandfather     No Known Problems Paternal Grandmother     No Known Problems Cousin     ADD / ADHD Neg Hx     Alcohol abuse Neg Hx     Anxiety disorder Neg Hx     Bipolar disorder Neg Hx     Dementia Neg Hx     Depression Neg Hx     Drug abuse Neg Hx     OCD Neg Hx     Paranoid behavior Neg Hx     Schizophrenia Neg Hx     Seizures Neg Hx     Self-Injury Neg Hx     Suicide Attempts Neg Hx        Meds/Allergies       Current Outpatient Medications:     FLUoxetine (PROzac) 40 MG capsule    QUEtiapine (SEROquel) 200 mg tablet    cholecalciferol (VITAMIN D3) 1,000 units tablet    doxepin (SINEquan) 10 mg capsule    Allergies   Allergen Reactions    Acetaminophen Vomiting     Pt. States it will hurt his liver           Objective     Blood pressure 142/84, pulse 81, temperature (!) 97.2 °F (36.2 °C), temperature source Tympanic, height 5' 6\" (1.676 m), weight 63.9 kg (140 lb 12.8 oz), SpO2 99%. Body mass index is 22.73 kg/m².        PHYSICAL EXAM:      General Appearance:   Alert, cooperative, no distress   HEENT:   Normocephalic, atraumatic, anicteric.     Neck:  Supple, symmetrical, trachea midline   Lungs:   Clear to auscultation bilaterally; no rales, rhonchi or wheezing; respirations unlabored    Heart::   Regular rate and " rhythm; no murmur, rub, or gallop.   Abdomen:   Soft, non-tender, non-distended; normal bowel sounds; no masses, no organomegaly    Genitalia:   Deferred    Rectal:   Deferred    Extremities:  No cyanosis, clubbing or edema    Pulses:  2+ and symmetric    Skin:  No jaundice, rashes, or lesions    Lymph nodes:  No palpable cervical lymphadenopathy        Lab Results:   No visits with results within 1 Day(s) from this visit.   Latest known visit with results is:   Admission on 04/06/2023, Discharged on 04/07/2023   Component Date Value    Amph/Meth UR 04/06/2023 Negative     Barbiturate Ur 04/06/2023 Negative     Benzodiazepine Urine 04/06/2023 Negative     Cocaine Urine 04/06/2023 Positive (A)     Methadone Urine 04/06/2023 Negative     Opiate Urine 04/06/2023 Negative     PCP Ur 04/06/2023 Negative     THC Urine 04/06/2023 Negative     Oxycodone Urine 04/06/2023 Negative     EXTBreath Alcohol 04/06/2023 0.00     WBC 04/06/2023 5.18     RBC 04/06/2023 4.24     Hemoglobin 04/06/2023 13.0     Hematocrit 04/06/2023 37.7     MCV 04/06/2023 89     MCH 04/06/2023 30.7     MCHC 04/06/2023 34.5     RDW 04/06/2023 16.7 (H)     MPV 04/06/2023 9.7     Platelets 04/06/2023 266     nRBC 04/06/2023 0     Segmented % 04/06/2023 66     Immature Grans % 04/06/2023 0     Lymphocytes % 04/06/2023 23     Monocytes % 04/06/2023 7     Eosinophils Relative 04/06/2023 4     Basophils Relative 04/06/2023 0     Absolute Neutrophils 04/06/2023 3.41     Absolute Immature Grans 04/06/2023 0.01     Absolute Lymphocytes 04/06/2023 1.21     Absolute Monocytes 04/06/2023 0.35     Eosinophils Absolute 04/06/2023 0.18     Basophils Absolute 04/06/2023 0.02     Sodium 04/06/2023 140     Potassium 04/06/2023 3.8     Chloride 04/06/2023 109 (H)     CO2 04/06/2023 28     ANION GAP 04/06/2023 3 (L)     BUN 04/06/2023 12     Creatinine 04/06/2023 0.82     Glucose 04/06/2023 120     Calcium 04/06/2023 8.3     AST 04/06/2023 77 (H)     ALT 04/06/2023 93 (H)      Alkaline Phosphatase 04/06/2023 102     Total Protein 04/06/2023 6.7     Albumin 04/06/2023 3.5     Total Bilirubin 04/06/2023 0.68     eGFR 04/06/2023 103     TSH 3RD GENERATON 04/06/2023 0.574     Ethanol Lvl 04/06/2023 <3     EXTBreath Alcohol 04/06/2023 0.00          Radiology Results:   No results found.    This note was completed in part utilizing Dragon Software. Grammatical errors, random word insertions, spelling mistakes, and incomplete sentences may be an occasional consequence of this system secondary to software limitations, ambient noise, and hardware issues. If you have any questions or concerns about the content, text, or information contained within the body of this dictation, please contact the provider for clarification.

## 2024-05-31 NOTE — TELEPHONE ENCOUNTER
Left voicemail and requested call back     Called to notify Provider has available Stacy 10 th for procedure,if not able to keep this date then provider would have to check her next availability in the next weeks and we will call to inform what day is good.

## 2024-06-04 NOTE — TELEPHONE ENCOUNTER
Faxed EGD prep instructions to Wendy at [facility] at  [phone]      Included note regarding timeframe Dr. Jaiyeola recommended EGD be done- 1-2 weeks from his 5/31 office visit; currently his EGD is now scheduled for 8/5  If symptoms worsen they should contact office to schedule a sooner procedure.

## 2024-06-18 ENCOUNTER — TELEPHONE (OUTPATIENT)
Dept: GASTROENTEROLOGY | Facility: MEDICAL CENTER | Age: 54
End: 2024-06-18

## 2024-06-18 NOTE — TELEPHONE ENCOUNTER
Spoke to Stacey at [facility] and rescheduled procedure from 08/05/2024 to 09/09/2024     Procedure: EGD  Date: 09/09/2024  Physician performing: Dr. Jaiyeola   Location of procedure:  [place]   Instructions given to patient: EGD PREP   Diabetic: N/A  Clearances: N/A    Requested an Early Time for procedure

## 2024-07-18 ENCOUNTER — TELEPHONE (OUTPATIENT)
Dept: GASTROENTEROLOGY | Facility: CLINIC | Age: 54
End: 2024-07-18

## 2024-07-18 NOTE — TELEPHONE ENCOUNTER
[facility] called to move pts egd up to asap as he is having trouble swallowing and they are not supplementing him with shakes. Pt's EGD r/s to 7/26 with Dr. Sharma.

## 2024-07-24 ENCOUNTER — TELEPHONE (OUTPATIENT)
Dept: GASTROENTEROLOGY | Facility: MEDICAL CENTER | Age: 54
End: 2024-07-24

## 2024-07-24 NOTE — TELEPHONE ENCOUNTER
Confirming Upcoming Procedure: EGD on July 26  Physician performing: Dr. Sharma  Location of procedure:    Prep: EGD

## 2024-07-25 ENCOUNTER — HOSPITAL ENCOUNTER (OUTPATIENT)
Dept: MRI IMAGING | Facility: HOSPITAL | Age: 54
End: 2024-07-25
Payer: OTHER GOVERNMENT

## 2024-07-25 DIAGNOSIS — M25.562 PAIN IN LEFT KNEE: ICD-10-CM

## 2024-07-25 PROCEDURE — 73721 MRI JNT OF LWR EXTRE W/O DYE: CPT

## 2024-07-26 ENCOUNTER — ANESTHESIA EVENT (OUTPATIENT)
Dept: GASTROENTEROLOGY | Facility: HOSPITAL | Age: 54
End: 2024-07-26

## 2024-07-26 ENCOUNTER — HOSPITAL ENCOUNTER (OUTPATIENT)
Dept: GASTROENTEROLOGY | Facility: HOSPITAL | Age: 54
Setting detail: OUTPATIENT SURGERY
End: 2024-07-26
Attending: INTERNAL MEDICINE
Payer: OTHER GOVERNMENT

## 2024-07-26 ENCOUNTER — ANESTHESIA (OUTPATIENT)
Dept: GASTROENTEROLOGY | Facility: HOSPITAL | Age: 54
End: 2024-07-26

## 2024-07-26 VITALS
RESPIRATION RATE: 16 BRPM | DIASTOLIC BLOOD PRESSURE: 76 MMHG | HEART RATE: 58 BPM | SYSTOLIC BLOOD PRESSURE: 128 MMHG | TEMPERATURE: 97 F | OXYGEN SATURATION: 99 %

## 2024-07-26 DIAGNOSIS — R13.10 DYSPHAGIA, UNSPECIFIED TYPE: ICD-10-CM

## 2024-07-26 DIAGNOSIS — R63.4 WEIGHT LOSS, UNINTENTIONAL: ICD-10-CM

## 2024-07-26 PROCEDURE — 43249 ESOPH EGD DILATION <30 MM: CPT | Performed by: STUDENT IN AN ORGANIZED HEALTH CARE EDUCATION/TRAINING PROGRAM

## 2024-07-26 PROCEDURE — C1726 CATH, BAL DIL, NON-VASCULAR: HCPCS

## 2024-07-26 PROCEDURE — 88305 TISSUE EXAM BY PATHOLOGIST: CPT | Performed by: PATHOLOGY

## 2024-07-26 PROCEDURE — 43239 EGD BIOPSY SINGLE/MULTIPLE: CPT | Performed by: STUDENT IN AN ORGANIZED HEALTH CARE EDUCATION/TRAINING PROGRAM

## 2024-07-26 RX ORDER — OMEPRAZOLE 40 MG/1
40 CAPSULE, DELAYED RELEASE ORAL DAILY
Qty: 30 CAPSULE | Refills: 2 | Status: SHIPPED | OUTPATIENT
Start: 2024-07-26 | End: 2024-07-26

## 2024-07-26 RX ORDER — OMEPRAZOLE 40 MG/1
40 CAPSULE, DELAYED RELEASE ORAL DAILY
Qty: 30 CAPSULE | Refills: 2 | Status: SHIPPED | OUTPATIENT
Start: 2024-07-26

## 2024-07-26 RX ORDER — PROPOFOL 10 MG/ML
INJECTION, EMULSION INTRAVENOUS AS NEEDED
Status: DISCONTINUED | OUTPATIENT
Start: 2024-07-26 | End: 2024-07-26

## 2024-07-26 RX ORDER — SODIUM CHLORIDE, SODIUM LACTATE, POTASSIUM CHLORIDE, CALCIUM CHLORIDE 600; 310; 30; 20 MG/100ML; MG/100ML; MG/100ML; MG/100ML
125 INJECTION, SOLUTION INTRAVENOUS CONTINUOUS
Status: CANCELLED | OUTPATIENT
Start: 2024-07-26

## 2024-07-26 RX ORDER — SODIUM CHLORIDE, SODIUM LACTATE, POTASSIUM CHLORIDE, CALCIUM CHLORIDE 600; 310; 30; 20 MG/100ML; MG/100ML; MG/100ML; MG/100ML
125 INJECTION, SOLUTION INTRAVENOUS CONTINUOUS
Status: DISCONTINUED | OUTPATIENT
Start: 2024-07-26 | End: 2024-07-30 | Stop reason: HOSPADM

## 2024-07-26 RX ORDER — PROPOFOL 10 MG/ML
INJECTION, EMULSION INTRAVENOUS CONTINUOUS PRN
Status: DISCONTINUED | OUTPATIENT
Start: 2024-07-26 | End: 2024-07-26

## 2024-07-26 RX ADMIN — SODIUM CHLORIDE, SODIUM LACTATE, POTASSIUM CHLORIDE, AND CALCIUM CHLORIDE 125 ML/HR: .6; .31; .03; .02 INJECTION, SOLUTION INTRAVENOUS at 10:33

## 2024-07-26 RX ADMIN — PROPOFOL 150 MG: 10 INJECTION, EMULSION INTRAVENOUS at 11:39

## 2024-07-26 RX ADMIN — PROPOFOL 150 MCG/KG/MIN: 10 INJECTION, EMULSION INTRAVENOUS at 11:43

## 2024-07-26 NOTE — ANESTHESIA POSTPROCEDURE EVALUATION
Post-Op Assessment Note    CV Status:  Stable  Pain Score: 0    Pain management: adequate       Mental Status:  Alert and awake   Hydration Status:  Stable   PONV Controlled:  None   Airway Patency:  Patent    No anethesia notable event occurred.    Staff: Anesthesiologist, with CRNAs               /80 (07/26/24 1206)    Temp (!) 97.4 °F (36.3 °C) (07/26/24 1206)    Pulse 85 (07/26/24 1206)   Resp 16 (07/26/24 1206)    SpO2 98 % (07/26/24 1206)

## 2024-07-26 NOTE — H&P
"History and Physical - [facility]  Perez Stack Jr. 53 y.o. male MRN: [id number]          HPI: Perez Stack Jr. is a 53 y.o. year old male who presents for EGD to evaluate solid-food dysphagia and weight loss.      REVIEW OF SYSTEMS: Per the HPI, and otherwise unremarkable.    Historical Information   Past Medical History:   Diagnosis Date    Anxiety     Bipolar I disorder, most recent episode depressed, severe with psychotic features (HCC) 2/23/2016    Depression     Foot infection     Hepatitis C virus infection 02/23/2016    has had treatment    Psychiatric illness     Self-injurious behavior     Substance abuse (HCC)     Suicide attempt (HCC)     Withdrawal symptoms, drug or narcotic (HCC)      History reviewed. No pertinent surgical history.  Social History   Social History     Substance and Sexual Activity   Alcohol Use No     Social History     Substance and Sexual Activity   Drug Use Yes    Types: Cocaine, \"Crack\" cocaine, Methamphetamines     Social History     Tobacco Use   Smoking Status Former    Types: Cigarettes   Smokeless Tobacco Former     Family History   Problem Relation Age of Onset    No Known Problems Mother     No Known Problems Father     No Known Problems Sister     No Known Problems Brother     No Known Problems Maternal Aunt     No Known Problems Paternal Aunt     No Known Problems Maternal Uncle     No Known Problems Paternal Uncle     No Known Problems Maternal Grandfather     No Known Problems Maternal Grandmother     No Known Problems Paternal Grandfather     No Known Problems Paternal Grandmother     No Known Problems Cousin     ADD / ADHD Neg Hx     Alcohol abuse Neg Hx     Anxiety disorder Neg Hx     Bipolar disorder Neg Hx     Dementia Neg Hx     Depression Neg Hx     Drug abuse Neg Hx     OCD Neg Hx     Paranoid behavior Neg Hx     Schizophrenia Neg Hx     Seizures Neg Hx     Self-Injury Neg Hx     Suicide Attempts Neg Hx        Meds/Allergies       Current " Outpatient Medications:     cholecalciferol (VITAMIN D3) 1,000 units tablet    doxepin (SINEquan) 10 mg capsule    FLUoxetine (PROzac) 40 MG capsule    QUEtiapine (SEROquel) 200 mg tablet    Current Facility-Administered Medications:     lactated ringers infusion, 125 mL/hr, Intravenous, Continuous    Allergies   Allergen Reactions    Acetaminophen Vomiting     Pt. States it will hurt his liver       Objective     /76   Pulse 72   Temp 98.6 °F (37 °C) (Temporal)   Resp 15   SpO2 100%       PHYSICAL EXAM    GEN: NAD  CARDIO: RRR  PULM: CTA bilaterally  ABD: soft, non-tender, non-distended  EXT: no lower extremity edema  NEURO: AAOx3      ASSESSMENT/PLAN:  53 y.o. year old male here for EGD; he is stable and optimized for his procedure.

## 2024-07-26 NOTE — ANESTHESIA PREPROCEDURE EVALUATION
Procedure:  EGD    Relevant Problems   GI/HEPATIC   (+) Chronic hepatitis B virus infection (HCC)   (+) Dysphagia   (+) Gastroesophageal reflux disease   (+) Hepatitis C virus infection        Physical Exam    Airway    Mallampati score: II  TM Distance: >3 FB  Neck ROM: full     Dental        Cardiovascular  Cardiovascular exam normal    Pulmonary  Pulmonary exam normal     Other Findings        Anesthesia Plan  ASA Score- 3     Anesthesia Type- IV sedation with anesthesia with ASA Monitors.         Additional Monitors:     Airway Plan:            Plan Factors-Exercise tolerance (METS): >4 METS.    Chart reviewed.   Existing labs reviewed.     Patient is not a current smoker. Patient not instructed to abstain from smoking on day of procedure. Patient did not smoke on day of surgery.            Induction- intravenous.    Postoperative Plan-         Informed Consent- Anesthetic plan and risks discussed with patient.  I personally reviewed this patient with the CRNA. Discussed and agreed on the Anesthesia Plan with the CRNA..

## 2024-07-30 PROCEDURE — 88305 TISSUE EXAM BY PATHOLOGIST: CPT | Performed by: PATHOLOGY

## 2024-09-03 ENCOUNTER — OFFICE VISIT (OUTPATIENT)
Dept: OBGYN CLINIC | Facility: MEDICAL CENTER | Age: 54
End: 2024-09-03
Payer: OTHER GOVERNMENT

## 2024-09-03 VITALS
HEIGHT: 66 IN | HEART RATE: 58 BPM | BODY MASS INDEX: 22.5 KG/M2 | SYSTOLIC BLOOD PRESSURE: 129 MMHG | WEIGHT: 140 LBS | DIASTOLIC BLOOD PRESSURE: 76 MMHG

## 2024-09-03 DIAGNOSIS — M65.9 SYNOVITIS OF LEFT KNEE: ICD-10-CM

## 2024-09-03 DIAGNOSIS — M17.12 PRIMARY OSTEOARTHRITIS OF LEFT KNEE: Primary | ICD-10-CM

## 2024-09-03 PROCEDURE — 20610 DRAIN/INJ JOINT/BURSA W/O US: CPT | Performed by: PHYSICIAN ASSISTANT

## 2024-09-03 PROCEDURE — 99203 OFFICE O/P NEW LOW 30 MIN: CPT | Performed by: ORTHOPAEDIC SURGERY

## 2024-09-03 RX ORDER — METHYLPREDNISOLONE ACETATE 40 MG/ML
2 INJECTION, SUSPENSION INTRA-ARTICULAR; INTRALESIONAL; INTRAMUSCULAR; SOFT TISSUE
Status: COMPLETED | OUTPATIENT
Start: 2024-09-03 | End: 2024-09-03

## 2024-09-03 RX ORDER — BUPIVACAINE HYDROCHLORIDE 2.5 MG/ML
2 INJECTION, SOLUTION INFILTRATION; PERINEURAL
Status: COMPLETED | OUTPATIENT
Start: 2024-09-03 | End: 2024-09-03

## 2024-09-03 RX ADMIN — METHYLPREDNISOLONE ACETATE 2 ML: 40 INJECTION, SUSPENSION INTRA-ARTICULAR; INTRALESIONAL; INTRAMUSCULAR; SOFT TISSUE at 10:00

## 2024-09-03 RX ADMIN — BUPIVACAINE HYDROCHLORIDE 2 ML: 2.5 INJECTION, SOLUTION INFILTRATION; PERINEURAL at 10:00

## 2024-09-03 NOTE — PROGRESS NOTES
Ortho Sports Medicine Knee New Patient Visit     Assesment:   53 y.o. male left knee OA    Plan:    Conservative treatment:    Ice to knee for 20 minutes at least 1-2 times daily.  PT for ROM/strengthening to knee, hip and core.  OTC NSAIDS prn for pain.  Referral to Rheumatology also place to be worked up regarding synovitis     Imaging:    All imaging from today was reviewed by myself and explained to the patient.       Injection:    The risks and benefits of the injection (which include but are not limited to: infection, bleeding,damage to nerve/artery, need for further intervention), as well as the risks and benefits of all alternative treatments were explained and understood.  The patient elected to proceed with injection.  The procedure was done with aseptic technique, and the patient tolerated the procedure well with no complications.    A corticosteroid injection was performed.    Ice to the knee 1-2 times daily for 20 minutes, for next 24-48 hrs.      Surgery:     No surgery is recommended at this point, continue with conservative treatment plan as noted.      Follow up:    Return if symptoms worsen or fail to improve.        Chief Complaint   Patient presents with    Left Knee - Pain       History of Present Illness:    The patient is a 53 y.o. male whose occupation is unemployed, referred to me by request of [facility] physician regarding Left knee pain.    Pain is located medial.  The patient rates the pain as a 8/10.  The pain has been present for 2 months.      The patient reports that he has been experiencing pain within his left knee for years.  Patient reports that the pain that he is experiencing now has been present for the last 2 months.  Patient reports that the pain is constant and becomes worse with weightbearing activities.  Patient reports that all of his pain is in the anterior medial aspect of the knee.  Patient does note having swelling on and off depending on his activity  "level.  He denies any injury or trauma.  He denies any clicking or catching within the knee.  Denies any buckling or instability.  The pain is characterized as sharp, stabbing.  The pain is present at all times.      Patient also is reporting to the office with an MRI from 7/25/2024.  MRI report states \"moderate joint effusion with synovitis\".  In regards to his MRI report, patient denies having any fevers chills or sweats.  He denies feeling ill.  Patient does note that for the last 2 weeks the left knee has seemed warmer compared to the right but again denies any recent illness or feeling sick.    Pain is improved by rest.  Pain is aggravated by stairs, squatting, and weight bearing.    Symptoms include swelling.     The patient has tried rest, ice, and NSAIDS.          Knee Surgical History:  None    Past Medical, Social and Family History:  Past Medical History:   Diagnosis Date    Anxiety     Bipolar I disorder, most recent episode depressed, severe with psychotic features (HCC) 2/23/2016    Depression     Foot infection     Hepatitis C virus infection 02/23/2016    has had treatment    Psychiatric illness     Self-injurious behavior     Substance abuse (HCC)     Suicide attempt (HCC)     Withdrawal symptoms, drug or narcotic (HCC)      History reviewed. No pertinent surgical history.  Allergies   Allergen Reactions    Acetaminophen Vomiting     Pt. States it will hurt his liver     Current Outpatient Medications on File Prior to Visit   Medication Sig Dispense Refill    omeprazole (PriLOSEC) 40 MG capsule Take 1 capsule (40 mg total) by mouth daily 30 capsule 2    cholecalciferol (VITAMIN D3) 1,000 units tablet Take 2 tablets (2,000 Units total) by mouth daily Do not start before May 13, 2023. (Patient not taking: Reported on 5/31/2024)  0    doxepin (SINEquan) 10 mg capsule Take 1 capsule (10 mg total) by mouth daily at bedtime as needed for sleep (Patient not taking: Reported on 5/31/2024)  0    FLUoxetine " "(PROzac) 40 MG capsule Take 1 capsule (40 mg total) by mouth daily Do not start before May 13, 2023.  0    QUEtiapine (SEROquel) 200 mg tablet Take 1 tablet (200 mg total) by mouth daily at bedtime 30 tablet 1     No current facility-administered medications on file prior to visit.     Social History     Socioeconomic History    Marital status: Single     Spouse name: Not on file    Number of children: Not on file    Years of education: Not on file    Highest education level: Not on file   Occupational History    Not on file   Tobacco Use    Smoking status: Former     Types: Cigarettes    Smokeless tobacco: Former   Vaping Use    Vaping status: Every Day    Substances: Nicotine, Flavoring   Substance and Sexual Activity    Alcohol use: No    Drug use: Yes     Types: Cocaine, \"Crack\" cocaine, Methamphetamines    Sexual activity: Not Currently   Other Topics Concern    Not on file   Social History Narrative    Not on file     Social Determinants of Health     Financial Resource Strain: Not on file   Food Insecurity: Not on file   Transportation Needs: Not on file   Physical Activity: Not on file   Stress: Not on file   Social Connections: Not on file   Intimate Partner Violence: Not on file   Housing Stability: Not on file         I have reviewed the past medical, surgical, social and family history, medications and allergies as documented in the EMR.    Review of systems: ROS is negative other than that noted in the HPI.  Constitutional: Negative for fatigue and fever.   HENT: Negative for sore throat.    Respiratory: Negative for shortness of breath.    Cardiovascular: Negative for chest pain.   Gastrointestinal: Negative for abdominal pain.   Endocrine: Negative for cold intolerance and heat intolerance.   Genitourinary: Negative for flank pain.   Musculoskeletal: Negative for back pain.   Skin: Negative for rash.   Allergic/Immunologic: Negative for immunocompromised state.   Neurological: Negative for dizziness. " "  Psychiatric/Behavioral: Negative for agitation.      Physical Exam:    Blood pressure 129/76, pulse 58, height 5' 6\" (1.676 m), weight 63.5 kg (140 lb).    General/Constitutional: NAD, well developed, well nourished  HENT: Normocephalic, atraumatic  CV: Intact distal pulses, regular rate  Resp: No respiratory distress or labored breathing  GI: Soft and non-tender   Lymphatic: No lymphadenopathy palpated  Neuro: Alert and Oriented x 3, no focal deficits  Psych: Normal mood, normal affect, normal judgement, normal behavior  Skin: Warm, dry, no rashes, no erythema      Knee Exam (focused):                RIGHT LEFT   ROM:   0-130 0-130   Palpation: Effusion negative negative     MJL tenderness Negative Positive     LJL tenderness Negative Negative   Meniscus: Bailey Negative Negative    Apley's Compression Negative Negative   Instability: Varus stable stable     Valgus stable stable   Special Tests: Lachman Negative Negative     Posterior drawer Negative Negative     Anterior drawer Negative Negative     Pivot shift not tested not tested     Dial not tested not tested   Patella: Palpation no tenderness medial facet ttp and lateral facet ttp     Mobility 1/4 1/4     Apprehension Negative Negative   Other: Single leg 1/4 squat not tested not tested      LE NV Exam: +2 DP/PT pulses bilaterally  Sensation intact to light touch L2-S1 bilaterally     Bilateral hip ROM demonstrates no pain actively or passively    No calf tenderness to palpation bilaterally    Knee Imaging    MRI of the left knee from 7/25/24 were reviewed, which demonstrate grade 4 cartilage loss of the medial femoral condyle and moderate joint effusion with synovitis.  I have reviewed the radiology report and agree with their impression.    Large joint arthrocentesis: L knee  Universal Protocol:  Consent given by: patient  Time out: Immediately prior to procedure a \"time out\" was called to verify the correct patient, procedure, equipment, support staff " and site/side marked as required.  Timeout called at: 9/3/2024 11:31 AM.  Patient understanding: patient states understanding of the procedure being performed  Site marked: the operative site was marked  Patient identity confirmed: verbally with patient  Supporting Documentation  Indications: pain   Procedure Details  Location: knee - L knee  Preparation: Patient was prepped and draped in the usual sterile fashion  Needle size: 22 G  Ultrasound guidance: no  Approach: anterolateral  Medications administered: 2 mL bupivacaine 0.25 %; 2 mL methylPREDNISolone acetate 40 mg/mL    Patient tolerance: patient tolerated the procedure well with no immediate complications  Dressing:  Sterile dressing applied

## 2024-09-20 ENCOUNTER — HOSPITAL ENCOUNTER (EMERGENCY)
Facility: HOSPITAL | Age: 54
End: 2024-09-21
Attending: EMERGENCY MEDICINE

## 2024-09-20 DIAGNOSIS — F19.10 POLYSUBSTANCE ABUSE (HCC): ICD-10-CM

## 2024-09-20 DIAGNOSIS — Z00.8 MEDICAL CLEARANCE FOR PSYCHIATRIC ADMISSION: ICD-10-CM

## 2024-09-20 DIAGNOSIS — F32.A DEPRESSION: Primary | ICD-10-CM

## 2024-09-20 LAB
ALBUMIN SERPL BCG-MCNC: 4.1 G/DL (ref 3.5–5)
ALP SERPL-CCNC: 62 U/L (ref 34–104)
ALT SERPL W P-5'-P-CCNC: 57 U/L (ref 7–52)
ANION GAP SERPL CALCULATED.3IONS-SCNC: 7 MMOL/L (ref 4–13)
AST SERPL W P-5'-P-CCNC: 66 U/L (ref 13–39)
ATRIAL RATE: 73 BPM
BASOPHILS # BLD AUTO: 0.02 THOUSANDS/ΜL (ref 0–0.1)
BASOPHILS NFR BLD AUTO: 0 % (ref 0–1)
BILIRUB SERPL-MCNC: 2.24 MG/DL (ref 0.2–1)
BUN SERPL-MCNC: 19 MG/DL (ref 5–25)
CALCIUM SERPL-MCNC: 8.9 MG/DL (ref 8.4–10.2)
CHLORIDE SERPL-SCNC: 99 MMOL/L (ref 96–108)
CO2 SERPL-SCNC: 31 MMOL/L (ref 21–32)
CREAT SERPL-MCNC: 0.78 MG/DL (ref 0.6–1.3)
EOSINOPHIL # BLD AUTO: 0.08 THOUSAND/ΜL (ref 0–0.61)
EOSINOPHIL NFR BLD AUTO: 1 % (ref 0–6)
ERYTHROCYTE [DISTWIDTH] IN BLOOD BY AUTOMATED COUNT: 15 % (ref 11.6–15.1)
ETHANOL EXG-MCNC: 0 MG/DL
GFR SERPL CREATININE-BSD FRML MDRD: 104 ML/MIN/1.73SQ M
GLUCOSE SERPL-MCNC: 147 MG/DL (ref 65–140)
HCT VFR BLD AUTO: 34.3 % (ref 36.5–49.3)
HGB BLD-MCNC: 12.3 G/DL (ref 12–17)
IMM GRANULOCYTES # BLD AUTO: 0.01 THOUSAND/UL (ref 0–0.2)
IMM GRANULOCYTES NFR BLD AUTO: 0 % (ref 0–2)
LYMPHOCYTES # BLD AUTO: 0.6 THOUSANDS/ΜL (ref 0.6–4.47)
LYMPHOCYTES NFR BLD AUTO: 10 % (ref 14–44)
MCH RBC QN AUTO: 32.1 PG (ref 26.8–34.3)
MCHC RBC AUTO-ENTMCNC: 35.9 G/DL (ref 31.4–37.4)
MCV RBC AUTO: 90 FL (ref 82–98)
MONOCYTES # BLD AUTO: 0.6 THOUSAND/ΜL (ref 0.17–1.22)
MONOCYTES NFR BLD AUTO: 10 % (ref 4–12)
NEUTROPHILS # BLD AUTO: 4.72 THOUSANDS/ΜL (ref 1.85–7.62)
NEUTS SEG NFR BLD AUTO: 79 % (ref 43–75)
NRBC BLD AUTO-RTO: 0 /100 WBCS
P AXIS: 76 DEGREES
PLATELET # BLD AUTO: 185 THOUSANDS/UL (ref 149–390)
PMV BLD AUTO: 9.9 FL (ref 8.9–12.7)
POTASSIUM SERPL-SCNC: 3.2 MMOL/L (ref 3.5–5.3)
PR INTERVAL: 160 MS
PROT SERPL-MCNC: 6.7 G/DL (ref 6.4–8.4)
QRS AXIS: 68 DEGREES
QRSD INTERVAL: 88 MS
QT INTERVAL: 428 MS
QTC INTERVAL: 471 MS
RBC # BLD AUTO: 3.83 MILLION/UL (ref 3.88–5.62)
SODIUM SERPL-SCNC: 137 MMOL/L (ref 135–147)
T WAVE AXIS: 43 DEGREES
TSH SERPL DL<=0.05 MIU/L-ACNC: 0.44 UIU/ML (ref 0.45–4.5)
VENTRICULAR RATE: 73 BPM
WBC # BLD AUTO: 6.03 THOUSAND/UL (ref 4.31–10.16)

## 2024-09-20 PROCEDURE — 93005 ELECTROCARDIOGRAM TRACING: CPT

## 2024-09-20 PROCEDURE — 99285 EMERGENCY DEPT VISIT HI MDM: CPT

## 2024-09-20 PROCEDURE — 85025 COMPLETE CBC W/AUTO DIFF WBC: CPT | Performed by: EMERGENCY MEDICINE

## 2024-09-20 PROCEDURE — 99285 EMERGENCY DEPT VISIT HI MDM: CPT | Performed by: EMERGENCY MEDICINE

## 2024-09-20 PROCEDURE — 82075 ASSAY OF BREATH ETHANOL: CPT | Performed by: EMERGENCY MEDICINE

## 2024-09-20 PROCEDURE — 93010 ELECTROCARDIOGRAM REPORT: CPT | Performed by: INTERNAL MEDICINE

## 2024-09-20 PROCEDURE — 84443 ASSAY THYROID STIM HORMONE: CPT | Performed by: EMERGENCY MEDICINE

## 2024-09-20 PROCEDURE — 80053 COMPREHEN METABOLIC PANEL: CPT | Performed by: EMERGENCY MEDICINE

## 2024-09-20 PROCEDURE — 36415 COLL VENOUS BLD VENIPUNCTURE: CPT | Performed by: EMERGENCY MEDICINE

## 2024-09-20 NOTE — ED NOTES
Patient sleeping.  Respirations even and unlabored.  No distress noted at this time.        Ingrid Virgen RN  09/20/24 1914

## 2024-09-20 NOTE — ED NOTES
Patient sleeping.  Respirations even and unlabored.  No distress noted at this time.     Ingrid Virgen RN  09/20/24 5467

## 2024-09-20 NOTE — ED NOTES
ED Crisis attempted to interview the patient. He was extremely tired, barely intelligible, and fell back to sleep multiple times. Will reattempt periodically.    no shortness of breath

## 2024-09-20 NOTE — ED NOTES
ED tech attempted to wake patient for dinner safety meal tray.  Patient sleeping.  Meal is stored for patient.     Ingrid Virgen RN  09/20/24 0388

## 2024-09-20 NOTE — ED PROVIDER NOTES
No diagnosis found.  ED Disposition       None          Assessment & Plan       Medical Decision Making  53 year old male, here for depression and polysubstance abuse, will obtain medical clearance. Plan for patient to speak with crisis and sign 201. No overt grounds for 302. If Patient prefers to get drug use under control before mental health treatment, can pursue HOST eval.     Amount and/or Complexity of Data Reviewed  Labs: ordered.                     Medications - No data to display    History of Present Illness       53-year-old male, concerns for depression, methamphetamine and crack cocaine smoking.  Last use just prior to arrival.  States the drug use is making him feel depressed, worthless and like he does not want to be in this world.  No overt plans for how he would commit suicide.        Review of Systems   Constitutional: Negative.  Negative for chills and fever.   HENT: Negative.  Negative for rhinorrhea, sore throat, trouble swallowing and voice change.    Eyes: Negative.  Negative for pain and visual disturbance.   Respiratory: Negative.  Negative for cough, shortness of breath and wheezing.    Cardiovascular: Negative.  Negative for chest pain and palpitations.   Gastrointestinal:  Negative for abdominal pain, diarrhea, nausea and vomiting.   Genitourinary: Negative.  Negative for dysuria and frequency.   Musculoskeletal: Negative.  Negative for neck pain and neck stiffness.   Skin: Negative.  Negative for rash.   Neurological: Negative.  Negative for dizziness, speech difficulty, weakness, light-headedness and numbness.   Psychiatric/Behavioral:  Positive for dysphoric mood and sleep disturbance. Negative for hallucinations and self-injury. The patient is nervous/anxious.            Objective     ED Triage Vitals [09/20/24 1317]   Temperature Pulse Blood Pressure Respirations SpO2 Patient Position - Orthostatic VS   98.7 °F (37.1 °C) 81 144/85 20 97 % Sitting      Temp Source Heart Rate Source  BP Location FiO2 (%) Pain Score    Tympanic Monitor Left arm -- --        Physical Exam  Vitals and nursing note reviewed.   Constitutional:       General: He is not in acute distress.     Appearance: He is well-developed.   HENT:      Head: Normocephalic and atraumatic.   Eyes:      Conjunctiva/sclera: Conjunctivae normal.      Pupils: Pupils are equal, round, and reactive to light.   Neck:      Trachea: No tracheal deviation.   Cardiovascular:      Rate and Rhythm: Normal rate and regular rhythm.   Pulmonary:      Effort: Pulmonary effort is normal. No respiratory distress.      Breath sounds: Normal breath sounds. No wheezing or rales.   Abdominal:      General: Bowel sounds are normal. There is no distension.      Palpations: Abdomen is soft.      Tenderness: There is no abdominal tenderness. There is no guarding or rebound.   Musculoskeletal:         General: No tenderness or deformity. Normal range of motion.      Cervical back: Normal range of motion and neck supple.   Skin:     General: Skin is warm and dry.      Capillary Refill: Capillary refill takes less than 2 seconds.      Findings: No rash.   Neurological:      Mental Status: He is alert and oriented to person, place, and time.      Cranial Nerves: Cranial nerves 2-12 are intact.      Sensory: Sensation is intact.      Motor: Motor function is intact.      Coordination: Coordination is intact.      Gait: Gait is intact.   Psychiatric:         Attention and Perception: Attention and perception normal.         Mood and Affect: Mood is anxious and depressed.         Speech: Speech is rapid and pressured.         Behavior: Behavior normal. Behavior is cooperative.         Thought Content: Thought content is not paranoid or delusional. Thought content does not include homicidal or suicidal ideation.         Labs Reviewed   CBC AND DIFFERENTIAL - Abnormal       Result Value    WBC 6.03      RBC 3.83 (*)     Hemoglobin 12.3      Hematocrit 34.3 (*)     MCV  90      MCH 32.1      MCHC 35.9      RDW 15.0      MPV 9.9      Platelets 185      nRBC 0      Segmented % 79 (*)     Immature Grans % 0      Lymphocytes % 10 (*)     Monocytes % 10      Eosinophils Relative 1      Basophils Relative 0      Absolute Neutrophils 4.72      Absolute Immature Grans 0.01      Absolute Lymphocytes 0.60      Absolute Monocytes 0.60      Eosinophils Absolute 0.08      Basophils Absolute 0.02     RAPID DRUG SCREEN, URINE   COMPREHENSIVE METABOLIC PANEL   TSH, 3RD GENERATION   UA W REFLEX TO MICROSCOPIC WITH REFLEX TO CULTURE   POCT ALCOHOL BREATH TEST     No orders to display       Procedures    ED Medication and Procedure Management   Prior to Admission Medications   Prescriptions Last Dose Informant Patient Reported? Taking?   FLUoxetine (PROzac) 40 MG capsule Not Taking  No No   Sig: Take 1 capsule (40 mg total) by mouth daily Do not start before May 13, 2023.   Patient not taking: Reported on 9/20/2024   QUEtiapine (SEROquel) 200 mg tablet Not Taking  No No   Sig: Take 1 tablet (200 mg total) by mouth daily at bedtime   Patient not taking: Reported on 9/20/2024   cholecalciferol (VITAMIN D3) 1,000 units tablet   No No   Sig: Take 2 tablets (2,000 Units total) by mouth daily Do not start before May 13, 2023.   Patient not taking: Reported on 5/31/2024   doxepin (SINEquan) 10 mg capsule   No No   Sig: Take 1 capsule (10 mg total) by mouth daily at bedtime as needed for sleep   Patient not taking: Reported on 5/31/2024   omeprazole (PriLOSEC) 40 MG capsule Not Taking  No No   Sig: Take 1 capsule (40 mg total) by mouth daily   Patient not taking: Reported on 9/20/2024      Facility-Administered Medications: None     Patient's Medications   Discharge Prescriptions    No medications on file     No discharge procedures on file.     Sid Garcia, DO  09/20/24 8924     Hpi Title: Evaluation of Skin Lesions Family Member: Brother Year Removed: 1900 Additional History: Patient complains of a chronic lesion on her posterior neck that she would like evaluated. She states it swells and drains fluid occasionally.

## 2024-09-20 NOTE — ED NOTES
"While inventorying patient belongings, patient put his shirt, pants, shoes, and socks into the trash. Patient stated \"I don't want those clothes, I don't want to be reminded.\" This tech with ED RN retrieved clothing from trash, checked clothing for contraband, and inventoried it with patient belongings. No contraband found.     Mali Fontaine  09/20/24 6502    "

## 2024-09-20 NOTE — ED NOTES
Patient says he is homeless and using drugs; he says he wants to stop but he is also feeling depressed with passive SI. He says a long time ago he was treated for depression with Seroquel and Fluoxetine.     Loida Xie RN  09/20/24 1243

## 2024-09-21 ENCOUNTER — HOSPITAL ENCOUNTER (INPATIENT)
Facility: HOSPITAL | Age: 54
LOS: 9 days | Discharge: HOME | DRG: 752 | End: 2024-09-30
Attending: STUDENT IN AN ORGANIZED HEALTH CARE EDUCATION/TRAINING PROGRAM | Admitting: STUDENT IN AN ORGANIZED HEALTH CARE EDUCATION/TRAINING PROGRAM
Payer: COMMERCIAL

## 2024-09-21 VITALS
TEMPERATURE: 97.9 F | DIASTOLIC BLOOD PRESSURE: 88 MMHG | WEIGHT: 131.39 LBS | OXYGEN SATURATION: 100 % | RESPIRATION RATE: 16 BRPM | BODY MASS INDEX: 21.21 KG/M2 | HEART RATE: 72 BPM | SYSTOLIC BLOOD PRESSURE: 150 MMHG

## 2024-09-21 DIAGNOSIS — F31.9 BIPOLAR DISORDER (HCC): Primary | ICD-10-CM

## 2024-09-21 DIAGNOSIS — F11.91 OPIOID USE DISORDER IN REMISSION: ICD-10-CM

## 2024-09-21 DIAGNOSIS — K21.9 GASTROESOPHAGEAL REFLUX DISEASE: ICD-10-CM

## 2024-09-21 DIAGNOSIS — F39 MOOD DISORDER (HCC): ICD-10-CM

## 2024-09-21 DIAGNOSIS — F15.20: ICD-10-CM

## 2024-09-21 DIAGNOSIS — Z00.8 MEDICAL CLEARANCE FOR PSYCHIATRIC ADMISSION: ICD-10-CM

## 2024-09-21 LAB
AMORPH PHOS CRY URNS QL MICRO: ABNORMAL /HPF
AMPHETAMINES SERPL QL SCN: NEGATIVE
BACTERIA UR QL AUTO: ABNORMAL /HPF
BARBITURATES UR QL: NEGATIVE
BENZODIAZ UR QL: NEGATIVE
BILIRUB UR QL STRIP: ABNORMAL
CLARITY UR: ABNORMAL
COCAINE UR QL: POSITIVE
COLOR UR: YELLOW
FENTANYL UR QL SCN: NEGATIVE
GLUCOSE UR STRIP-MCNC: NEGATIVE MG/DL
HGB UR QL STRIP.AUTO: NEGATIVE
HYDROCODONE UR QL SCN: NEGATIVE
KETONES UR STRIP-MCNC: ABNORMAL MG/DL
LEUKOCYTE ESTERASE UR QL STRIP: 25
METHADONE UR QL: NEGATIVE
MUCOUS THREADS UR QL AUTO: ABNORMAL
NITRITE UR QL STRIP: NEGATIVE
NON-SQ EPI CELLS URNS QL MICRO: ABNORMAL /HPF
OPIATES UR QL SCN: NEGATIVE
OXYCODONE+OXYMORPHONE UR QL SCN: NEGATIVE
PCP UR QL: NEGATIVE
PH UR STRIP.AUTO: 7 [PH]
PROT UR STRIP-MCNC: ABNORMAL MG/DL
RBC #/AREA URNS AUTO: ABNORMAL /HPF
SP GR UR STRIP.AUTO: 1.01 (ref 1–1.04)
THC UR QL: NEGATIVE
UROBILINOGEN UA: 12 MG/DL
WBC #/AREA URNS AUTO: ABNORMAL /HPF

## 2024-09-21 PROCEDURE — 81003 URINALYSIS AUTO W/O SCOPE: CPT | Performed by: EMERGENCY MEDICINE

## 2024-09-21 PROCEDURE — 81001 URINALYSIS AUTO W/SCOPE: CPT | Performed by: EMERGENCY MEDICINE

## 2024-09-21 PROCEDURE — 80307 DRUG TEST PRSMV CHEM ANLYZR: CPT | Performed by: EMERGENCY MEDICINE

## 2024-09-21 RX ORDER — DIPHENHYDRAMINE HYDROCHLORIDE 50 MG/ML
50 INJECTION INTRAMUSCULAR; INTRAVENOUS EVERY 6 HOURS PRN
Status: DISCONTINUED | OUTPATIENT
Start: 2024-09-21 | End: 2024-09-30 | Stop reason: HOSPADM

## 2024-09-21 RX ORDER — IBUPROFEN 600 MG/1
600 TABLET, FILM COATED ORAL EVERY 6 HOURS PRN
Status: DISCONTINUED | OUTPATIENT
Start: 2024-09-21 | End: 2024-09-30 | Stop reason: HOSPADM

## 2024-09-21 RX ORDER — IBUPROFEN 800 MG/1
800 TABLET, FILM COATED ORAL EVERY 8 HOURS PRN
Status: DISCONTINUED | OUTPATIENT
Start: 2024-09-21 | End: 2024-09-30 | Stop reason: HOSPADM

## 2024-09-21 RX ORDER — OLANZAPINE 10 MG/2ML
10 INJECTION, POWDER, FOR SOLUTION INTRAMUSCULAR
Status: DISCONTINUED | OUTPATIENT
Start: 2024-09-21 | End: 2024-09-30 | Stop reason: HOSPADM

## 2024-09-21 RX ORDER — OLANZAPINE 10 MG/2ML
10 INJECTION, POWDER, FOR SOLUTION INTRAMUSCULAR
Status: CANCELLED | OUTPATIENT
Start: 2024-09-21

## 2024-09-21 RX ORDER — IBUPROFEN 600 MG/1
600 TABLET, FILM COATED ORAL ONCE
Status: DISCONTINUED | OUTPATIENT
Start: 2024-09-21 | End: 2024-09-21 | Stop reason: HOSPADM

## 2024-09-21 RX ORDER — OLANZAPINE 5 MG/1
5 TABLET ORAL
Status: DISCONTINUED | OUTPATIENT
Start: 2024-09-21 | End: 2024-09-30 | Stop reason: HOSPADM

## 2024-09-21 RX ORDER — OLANZAPINE 10 MG/1
10 TABLET ORAL
Status: CANCELLED | OUTPATIENT
Start: 2024-09-21

## 2024-09-21 RX ORDER — IBUPROFEN 400 MG/1
400 TABLET, FILM COATED ORAL EVERY 4 HOURS PRN
Status: DISCONTINUED | OUTPATIENT
Start: 2024-09-21 | End: 2024-09-30 | Stop reason: HOSPADM

## 2024-09-21 RX ORDER — POLYETHYLENE GLYCOL 3350 17 G/17G
17 POWDER, FOR SOLUTION ORAL DAILY PRN
Status: DISCONTINUED | OUTPATIENT
Start: 2024-09-21 | End: 2024-09-30 | Stop reason: HOSPADM

## 2024-09-21 RX ORDER — DIPHENHYDRAMINE HYDROCHLORIDE 50 MG/ML
50 INJECTION INTRAMUSCULAR; INTRAVENOUS EVERY 6 HOURS PRN
Status: CANCELLED | OUTPATIENT
Start: 2024-09-21

## 2024-09-21 RX ORDER — IBUPROFEN 400 MG/1
400 TABLET, FILM COATED ORAL EVERY 4 HOURS PRN
Status: CANCELLED | OUTPATIENT
Start: 2024-09-21

## 2024-09-21 RX ORDER — TRAZODONE HYDROCHLORIDE 50 MG/1
50 TABLET, FILM COATED ORAL
Status: DISCONTINUED | OUTPATIENT
Start: 2024-09-21 | End: 2024-09-30 | Stop reason: HOSPADM

## 2024-09-21 RX ORDER — IBUPROFEN 400 MG/1
800 TABLET, FILM COATED ORAL EVERY 8 HOURS PRN
Status: CANCELLED | OUTPATIENT
Start: 2024-09-21

## 2024-09-21 RX ORDER — TRAZODONE HYDROCHLORIDE 50 MG/1
50 TABLET, FILM COATED ORAL
Status: CANCELLED | OUTPATIENT
Start: 2024-09-21

## 2024-09-21 RX ORDER — IBUPROFEN 600 MG/1
600 TABLET, FILM COATED ORAL ONCE
Status: COMPLETED | OUTPATIENT
Start: 2024-09-21 | End: 2024-09-21

## 2024-09-21 RX ORDER — HYDROXYZINE HYDROCHLORIDE 50 MG/1
50 TABLET, FILM COATED ORAL
Status: DISCONTINUED | OUTPATIENT
Start: 2024-09-21 | End: 2024-09-30 | Stop reason: HOSPADM

## 2024-09-21 RX ORDER — BENZTROPINE MESYLATE 1 MG/1
1 TABLET ORAL
Status: DISCONTINUED | OUTPATIENT
Start: 2024-09-21 | End: 2024-09-30 | Stop reason: HOSPADM

## 2024-09-21 RX ORDER — OLANZAPINE 10 MG/1
10 TABLET ORAL
Status: DISCONTINUED | OUTPATIENT
Start: 2024-09-21 | End: 2024-09-30 | Stop reason: HOSPADM

## 2024-09-21 RX ORDER — OLANZAPINE 10 MG/2ML
5 INJECTION, POWDER, FOR SOLUTION INTRAMUSCULAR
Status: CANCELLED | OUTPATIENT
Start: 2024-09-21

## 2024-09-21 RX ORDER — IBUPROFEN 600 MG/1
600 TABLET, FILM COATED ORAL EVERY 6 HOURS PRN
Status: CANCELLED | OUTPATIENT
Start: 2024-09-21

## 2024-09-21 RX ORDER — HYDROXYZINE HYDROCHLORIDE 50 MG/1
100 TABLET, FILM COATED ORAL
Status: CANCELLED | OUTPATIENT
Start: 2024-09-21

## 2024-09-21 RX ORDER — ACETAMINOPHEN 325 MG/1
975 TABLET ORAL ONCE
Status: DISCONTINUED | OUTPATIENT
Start: 2024-09-21 | End: 2024-09-21 | Stop reason: HOSPADM

## 2024-09-21 RX ORDER — OLANZAPINE 5 MG/1
5 TABLET ORAL
Status: CANCELLED | OUTPATIENT
Start: 2024-09-21

## 2024-09-21 RX ORDER — AMOXICILLIN 250 MG
1 CAPSULE ORAL DAILY PRN
Status: CANCELLED | OUTPATIENT
Start: 2024-09-21

## 2024-09-21 RX ORDER — BENZTROPINE MESYLATE 1 MG/1
1 TABLET ORAL
Status: CANCELLED | OUTPATIENT
Start: 2024-09-21

## 2024-09-21 RX ORDER — HYDROXYZINE HYDROCHLORIDE 50 MG/1
50 TABLET, FILM COATED ORAL
Status: CANCELLED | OUTPATIENT
Start: 2024-09-21

## 2024-09-21 RX ORDER — POLYETHYLENE GLYCOL 3350 17 G/17G
17 POWDER, FOR SOLUTION ORAL DAILY PRN
Status: CANCELLED | OUTPATIENT
Start: 2024-09-21

## 2024-09-21 RX ORDER — AMOXICILLIN 250 MG
1 CAPSULE ORAL DAILY PRN
Status: DISCONTINUED | OUTPATIENT
Start: 2024-09-21 | End: 2024-09-30 | Stop reason: HOSPADM

## 2024-09-21 RX ORDER — LORAZEPAM 2 MG/ML
2 INJECTION INTRAMUSCULAR EVERY 6 HOURS PRN
Status: DISCONTINUED | OUTPATIENT
Start: 2024-09-21 | End: 2024-09-30 | Stop reason: HOSPADM

## 2024-09-21 RX ORDER — HYDROXYZINE HYDROCHLORIDE 25 MG/1
25 TABLET, FILM COATED ORAL
Status: DISCONTINUED | OUTPATIENT
Start: 2024-09-21 | End: 2024-09-30 | Stop reason: HOSPADM

## 2024-09-21 RX ORDER — OLANZAPINE 2.5 MG/1
2.5 TABLET, FILM COATED ORAL
Status: DISCONTINUED | OUTPATIENT
Start: 2024-09-21 | End: 2024-09-30 | Stop reason: HOSPADM

## 2024-09-21 RX ORDER — HYDROXYZINE HYDROCHLORIDE 50 MG/1
100 TABLET, FILM COATED ORAL
Status: DISCONTINUED | OUTPATIENT
Start: 2024-09-21 | End: 2024-09-30 | Stop reason: HOSPADM

## 2024-09-21 RX ORDER — LORAZEPAM 2 MG/ML
2 INJECTION INTRAMUSCULAR EVERY 6 HOURS PRN
Status: CANCELLED | OUTPATIENT
Start: 2024-09-21

## 2024-09-21 RX ORDER — HYDROXYZINE HYDROCHLORIDE 25 MG/1
25 TABLET, FILM COATED ORAL
Status: CANCELLED | OUTPATIENT
Start: 2024-09-21

## 2024-09-21 RX ORDER — OLANZAPINE 5 MG/1
2.5 TABLET ORAL
Status: CANCELLED | OUTPATIENT
Start: 2024-09-21

## 2024-09-21 RX ORDER — OLANZAPINE 10 MG/2ML
5 INJECTION, POWDER, FOR SOLUTION INTRAMUSCULAR
Status: DISCONTINUED | OUTPATIENT
Start: 2024-09-21 | End: 2024-09-30 | Stop reason: HOSPADM

## 2024-09-21 RX ADMIN — IBUPROFEN 600 MG: 600 TABLET, FILM COATED ORAL at 04:50

## 2024-09-21 NOTE — ED NOTES
CIS attempted to assess patient, although patient remains asleep unable to be woken up    Colby Ortega  Crisis Intervention Specialist II

## 2024-09-21 NOTE — QUICK NOTE
Belongings on admission:    Black nike hat  Black bag   and cable  Black Kappa pants with string  Black marcial shirt  Black socks  Underwear  White adidas shoes  Cellphone  Blue lighter  Scissors

## 2024-09-21 NOTE — ED NOTES
States generalized body aches - provider made aware and medicated with motrin as ordered.  Extra fluids and blankets given. Reminded of need for urine specimen.     Jazlyn Reynolds RN  09/21/24 0453       Jazlyn Reynolds RN  09/21/24 0454

## 2024-09-21 NOTE — ED NOTES
The patient is a 54 y/o M who was self-referred for suicidality in the context of withdrawal from cocaine and methamphetamine. The patient has a history of bipolar I disorder, depression, psychosis, heavy cocaine use, IV methamphetamine use and IV opioid use, currently in remission since 8/12/2018. UDS was positive for cocaine. BAT was negative. Patient has a history of poor outpatient compliance with treatment. He stated he has been living on the streets, has little income, his relationship with his significant other, who was a stabilizing influence, has ended, and he remains estranged from his family. He has had a long period of poor sleep, appetite and nutrition, and has not been tending to his ADLs. He reports depressed, irritable mood and suicidal ideation with a plan to overdose on street drugs. The patient has a history of multiple attempts of this nature, with the last being about a year ago. He denies homicidal ideation. Patient has had poor focus and concentration, decreased energy and no motivation. He does have a history of auditory hallucinations, however, he has not been experiencing them for this episode. Denies visual hallucinations. There is no evidence of delusional thinking. He does not have a current provider, nor timely access to care in the community. His  insurance coverage has lapsed.The patient would benefit from an inpatient admission for safety and stabilization. He signed a voluntary admission agreement.

## 2024-09-21 NOTE — ED NOTES
Patient was moved from hallway bed to room 13 as patient stated that he did not feel safe in the hallway- remains on virtual monitoring. Patient did eat a meal but remains sleepy overall. Denies pain or discomfort. States that he wants in -patient psychiatric admission for his mental health. Presently, suicidal ideation but has no plan. Patient was reminded of need for urine specimen. Patient returned to sleep after transfer process.     Jazlyn Reynolds RN  09/21/24 0000

## 2024-09-21 NOTE — ED NOTES
Assumed care of pt at this time. Pt resting with no signs of distress noted. Virtual monitoring in progress.      Jasmin Samayoa RN  09/21/24 0708

## 2024-09-21 NOTE — ED NOTES
Patient is accepted at [facility]  Patient is accepted by David Kemp MD.     Transportation is arranged with: [facility].  Transportation is scheduled for 1330.   Patient may go to the floor without arrival time constraints.      Nurse report is to be called to [phone] prior to patient transfer.

## 2024-09-21 NOTE — ED NOTES
Patient sleeping.  Respirations even and unlabored.  No signs of distress noted at this time.     Ingrid Virgen RN  09/20/24 2126       Ingrid Virgen RN  09/20/24 2127

## 2024-09-21 NOTE — PLAN OF CARE
Problem: SELF HARM/SUICIDALITY  Goal: Will have no self-injury during hospital stay  Description: INTERVENTIONS:  - Q 15 MINUTES: Routine safety checks  - Q WAKING SHIFT & PRN: Assess risk to determine if routine checks are adequate to maintain patient safety  - Encourage patient to participate actively in care by formulating a plan to combat response to suicidal ideation, identify supports and resources  Outcome: Progressing     Problem: DEPRESSION  Goal: Will be euthymic at discharge  Description: INTERVENTIONS:  - Administer medication as ordered  - Provide emotional support via 1:1 interaction with staff  - Encourage involvement in milieu/groups/activities  - Monitor for social isolation  Outcome: Progressing     Problem: BEHAVIOR  Goal: Pt/Family maintain appropriate behavior and adhere to behavioral management agreement, if implemented  Description: INTERVENTIONS:  - Assess the family dynamic   - Encourage verbalization of thoughts and concerns in a socially appropriate manner  - Assess patient/family's coping skills and non-compliant behavior (including use of illegal substances).  - Utilize positive, consistent limit setting strategies supporting safety of patient, staff and others  - Initiate consult with Case Management, Spiritual Care or other ancillary services as appropriate  - If a patient's/visitor's behavior jeopardizes the safety of the patient, staff, or others, refer to organization procedure.   - Notify Security of behavior or suspected illegal substances which indicate the need for search of the patient and/or belongings  - Encourage participation in the decision making process about a behavioral management agreement; implement if patient meets criteria  Outcome: Progressing     Problem: ANXIETY  Goal: Will report anxiety at manageable levels  Description: INTERVENTIONS:  - Administer medication as ordered  - Teach and encourage coping skills  - Provide emotional support  - Assess  patient/family for anxiety and ability to cope  Outcome: Progressing  Goal: By discharge: Patient will verbalize 2 strategies to deal with anxiety  Description: Interventions:  - Identify any obvious source/trigger to anxiety  - Staff will assist patient in applying identified coping technique/skills  - Encourage attendance of scheduled groups and activities  Outcome: Progressing     Problem: SLEEP DISTURBANCE  Goal: Will exhibit normal sleeping pattern  Description: Interventions:  -  Assess the patients sleep pattern, noting recent changes  - Administer medication as ordered  - Decrease environmental stimuli, including noise, as appropriate during the night  - Encourage the patient to actively participate in unit groups and or exercise during the day to enhance ability to achieve adequate sleep at night  - Assess the patient, in the morning, encouraging a description of sleep experience  Outcome: Progressing

## 2024-09-21 NOTE — NURSING NOTE
Pt is a 201 from [facility] ED with increased depression, SI to overdose on drugs. Pt reports smoking meth and crack cocaine, LILIANA: 2 days ago. UDS+ cocaine. Pt is homeless after being released from [facility] few days ago. Pt states being on Suboxone maintenance, Prozac, and Seroquel, last dose 2-3 days ago in longterm. Pt still endorses SI, feels safe in hospital. Pt has possible infection of toes on B/L feet, redness, swelling, warm to touch x 2 weeks without treatment.     PTA meds completed with pt, unable to verify with pharmacy, pt incarcerated PTA, C-SSRS Moderate Risk. Dr. Kemp made aware via EPIC chat, awaiting response.

## 2024-09-21 NOTE — EMTALA/ACUTE CARE TRANSFER
[facility] EMERGENCY DEPARTMENT  [address] [facility] [address]  [phone]  Dept: [phone]      EMTALA TRANSFER CONSENT    NAME Perez Stack Jr.                                         1970                              MRN [id number]    I have been informed of my rights regarding examination, treatment, and transfer   by Dr. Sukhi Puckett MD    Benefits: Continuity of care    Risks: Potential for delay in receiving treatment      Consent for Transfer:  I acknowledge that my medical condition has been evaluated and explained to me by the emergency department physician or other qualified medical person and/or my attending physician, who has recommended that I be transferred to the service of  Accepting Physician: David Kemp MD at Accepting Facility Name, City & State : [facility], [address] [place] [address] Ave, [facility] [address]. The above potential benefits of such transfer, the potential risks associated with such transfer, and the probable risks of not being transferred have been explained to me, and I fully understand them.  The doctor has explained that, in my case, the benefits of transfer outweigh the risks.  I agree to be transferred.    I authorize the performance of emergency medical procedures and treatments upon me in both transit and upon arrival at the receiving facility.  Additionally, I authorize the release of any and all medical records to the receiving facility and request they be transported with me, if possible.  I understand that the safest mode of transportation during a medical emergency is an ambulance and that the Hospital advocates the use of this mode of transport. Risks of traveling to the receiving facility by car, including absence of medical control, life sustaining equipment, such as oxygen, and medical personnel has been explained to me and I fully understand them.    (ORIN CORRECT BOX BELOW)  [  ]  I consent to the stated transfer and  to be transported by ambulance/helicopter.  [  ]  I consent to the stated transfer, but refuse transportation by ambulance and accept full responsibility for my transportation by car.  I understand the risks of non-ambulance transfers and I exonerate the Hospital and its staff from any deterioration in my condition that results from this refusal.    X___________________________________________    DATE  24  TIME________  Signature of patient or legally responsible individual signing on patient behalf           RELATIONSHIP TO PATIENT_________________________          Provider Certification    NAME Perez Stack Jr.                                         1970                              MRN [id number]    A medical screening exam was performed on the above named patient.  Based on the examination:    Condition Necessitating Transfer The primary encounter diagnosis was Depression. Diagnoses of Polysubstance abuse (HCC) and Medical clearance for psychiatric admission were also pertinent to this visit.    Patient Condition: The patient has been stabilized such that within reasonable medical probability, no material deterioration of the patient condition or the condition of the unborn child(debbie) is likely to result from the transfer    Reason for Transfer: No bed available at level of patient's needs    Transfer Requirements: [facility], [address] [place] [id number]   Space available and qualified personnel available for treatment as acknowledged by Brooke Leonardo/ [phone]  Agreed to accept transfer and to provide appropriate medical treatment as acknowledged by       David Kemp MD  Appropriate medical records of the examination and treatment of the patient are provided at the time of transfer   STAFF INITIAL WHEN COMPLETED _______  Transfer will be performed by qualified personnel from [facility]/ [phone]  and appropriate transfer equipment as required, including the  use of necessary and appropriate life support measures.    Provider Certification: I have examined the patient and explained the following risks and benefits of being transferred/refusing transfer to the patient/family:  General risk, such as traffic hazards, adverse weather conditions, rough terrain or turbulence, possible failure of equipment (including vehicle or aircraft), or consequences of actions of persons outside the control of the transport personnel      Based on these reasonable risks and benefits to the patient and/or the unborn child(debbie), and based upon the information available at the time of the patient’s examination, I certify that the medical benefits reasonably to be expected from the provision of appropriate medical treatments at another medical facility outweigh the increasing risks, if any, to the individual’s medical condition, and in the case of labor to the unborn child, from effecting the transfer.    X____________________________________________ DATE 09/21/24        TIME_______      ORIGINAL - SEND TO MEDICAL RECORDS   COPY - SEND WITH PATIENT DURING TRANSFER

## 2024-09-22 PROBLEM — R79.89 ELEVATED LFTS: Status: ACTIVE | Noted: 2024-09-22

## 2024-09-22 PROBLEM — R79.89 LOW TSH LEVEL: Status: ACTIVE | Noted: 2024-09-22

## 2024-09-22 PROBLEM — M79.676 TOE PAIN: Status: ACTIVE | Noted: 2024-09-22

## 2024-09-22 LAB
25(OH)D3 SERPL-MCNC: 30.8 NG/ML (ref 30–100)
ALBUMIN SERPL BCG-MCNC: 3.7 G/DL (ref 3.5–5)
ALP SERPL-CCNC: 55 U/L (ref 34–104)
ALT SERPL W P-5'-P-CCNC: 38 U/L (ref 7–52)
ANION GAP SERPL CALCULATED.3IONS-SCNC: 5 MMOL/L (ref 4–13)
AST SERPL W P-5'-P-CCNC: 35 U/L (ref 13–39)
BASOPHILS # BLD AUTO: 0.03 THOUSANDS/ΜL (ref 0–0.1)
BASOPHILS NFR BLD AUTO: 1 % (ref 0–1)
BILIRUB SERPL-MCNC: 1.12 MG/DL (ref 0.2–1)
BUN SERPL-MCNC: 16 MG/DL (ref 5–25)
CALCIUM SERPL-MCNC: 9.2 MG/DL (ref 8.4–10.2)
CHLORIDE SERPL-SCNC: 110 MMOL/L (ref 96–108)
CHOLEST SERPL-MCNC: 100 MG/DL
CO2 SERPL-SCNC: 31 MMOL/L (ref 21–32)
CREAT SERPL-MCNC: 0.72 MG/DL (ref 0.6–1.3)
EOSINOPHIL # BLD AUTO: 0.15 THOUSAND/ΜL (ref 0–0.61)
EOSINOPHIL NFR BLD AUTO: 5 % (ref 0–6)
ERYTHROCYTE [DISTWIDTH] IN BLOOD BY AUTOMATED COUNT: 15.3 % (ref 11.6–15.1)
EST. AVERAGE GLUCOSE BLD GHB EST-MCNC: 74 MG/DL
FOLATE SERPL-MCNC: 16.4 NG/ML
GFR SERPL CREATININE-BSD FRML MDRD: 108 ML/MIN/1.73SQ M
GLUCOSE SERPL-MCNC: 89 MG/DL (ref 65–140)
HBA1C MFR BLD: 4.2 %
HCT VFR BLD AUTO: 36.2 % (ref 36.5–49.3)
HDLC SERPL-MCNC: 42 MG/DL
HGB BLD-MCNC: 12.6 G/DL (ref 12–17)
IMM GRANULOCYTES # BLD AUTO: 0.02 THOUSAND/UL (ref 0–0.2)
IMM GRANULOCYTES NFR BLD AUTO: 1 % (ref 0–2)
LDLC SERPL CALC-MCNC: 48 MG/DL (ref 0–100)
LYMPHOCYTES # BLD AUTO: 0.94 THOUSANDS/ΜL (ref 0.6–4.47)
LYMPHOCYTES NFR BLD AUTO: 29 % (ref 14–44)
MCH RBC QN AUTO: 31.8 PG (ref 26.8–34.3)
MCHC RBC AUTO-ENTMCNC: 34.8 G/DL (ref 31.4–37.4)
MCV RBC AUTO: 91 FL (ref 82–98)
MONOCYTES # BLD AUTO: 0.37 THOUSAND/ΜL (ref 0.17–1.22)
MONOCYTES NFR BLD AUTO: 11 % (ref 4–12)
NEUTROPHILS # BLD AUTO: 1.79 THOUSANDS/ΜL (ref 1.85–7.62)
NEUTS SEG NFR BLD AUTO: 53 % (ref 43–75)
NONHDLC SERPL-MCNC: 58 MG/DL
NRBC BLD AUTO-RTO: 0 /100 WBCS
PLATELET # BLD AUTO: 231 THOUSANDS/UL (ref 149–390)
PMV BLD AUTO: 10.6 FL (ref 8.9–12.7)
POTASSIUM SERPL-SCNC: 4.1 MMOL/L (ref 3.5–5.3)
PROT SERPL-MCNC: 5.9 G/DL (ref 6.4–8.4)
RBC # BLD AUTO: 3.96 MILLION/UL (ref 3.88–5.62)
SODIUM SERPL-SCNC: 146 MMOL/L (ref 135–147)
T4 SERPL-MCNC: 7.9 UG/DL (ref 6.09–12.23)
TRIGL SERPL-MCNC: 51 MG/DL
TSH SERPL DL<=0.05 MIU/L-ACNC: 0.46 UIU/ML (ref 0.45–4.5)
URATE SERPL-MCNC: 3.7 MG/DL (ref 3.5–8.5)
VIT B12 SERPL-MCNC: 305 PG/ML (ref 180–914)
WBC # BLD AUTO: 3.3 THOUSAND/UL (ref 4.31–10.16)

## 2024-09-22 PROCEDURE — 83036 HEMOGLOBIN GLYCOSYLATED A1C: CPT | Performed by: STUDENT IN AN ORGANIZED HEALTH CARE EDUCATION/TRAINING PROGRAM

## 2024-09-22 PROCEDURE — 80053 COMPREHEN METABOLIC PANEL: CPT | Performed by: STUDENT IN AN ORGANIZED HEALTH CARE EDUCATION/TRAINING PROGRAM

## 2024-09-22 PROCEDURE — 82306 VITAMIN D 25 HYDROXY: CPT | Performed by: STUDENT IN AN ORGANIZED HEALTH CARE EDUCATION/TRAINING PROGRAM

## 2024-09-22 PROCEDURE — 82746 ASSAY OF FOLIC ACID SERUM: CPT | Performed by: STUDENT IN AN ORGANIZED HEALTH CARE EDUCATION/TRAINING PROGRAM

## 2024-09-22 PROCEDURE — 93005 ELECTROCARDIOGRAM TRACING: CPT

## 2024-09-22 PROCEDURE — 80061 LIPID PANEL: CPT | Performed by: STUDENT IN AN ORGANIZED HEALTH CARE EDUCATION/TRAINING PROGRAM

## 2024-09-22 PROCEDURE — 99253 IP/OBS CNSLTJ NEW/EST LOW 45: CPT

## 2024-09-22 PROCEDURE — 99223 1ST HOSP IP/OBS HIGH 75: CPT | Performed by: STUDENT IN AN ORGANIZED HEALTH CARE EDUCATION/TRAINING PROGRAM

## 2024-09-22 PROCEDURE — 85025 COMPLETE CBC W/AUTO DIFF WBC: CPT | Performed by: STUDENT IN AN ORGANIZED HEALTH CARE EDUCATION/TRAINING PROGRAM

## 2024-09-22 PROCEDURE — 84436 ASSAY OF TOTAL THYROXINE: CPT

## 2024-09-22 PROCEDURE — 84550 ASSAY OF BLOOD/URIC ACID: CPT

## 2024-09-22 PROCEDURE — 82607 VITAMIN B-12: CPT | Performed by: STUDENT IN AN ORGANIZED HEALTH CARE EDUCATION/TRAINING PROGRAM

## 2024-09-22 PROCEDURE — 84443 ASSAY THYROID STIM HORMONE: CPT

## 2024-09-22 RX ORDER — BUPRENORPHINE AND NALOXONE 8; 2 MG/1; MG/1
8 FILM, SOLUBLE BUCCAL; SUBLINGUAL 2 TIMES DAILY
Status: DISCONTINUED | OUTPATIENT
Start: 2024-09-22 | End: 2024-09-30 | Stop reason: HOSPADM

## 2024-09-22 RX ORDER — BUPRENORPHINE AND NALOXONE 8; 2 MG/1; MG/1
8 FILM, SOLUBLE BUCCAL; SUBLINGUAL 2 TIMES DAILY
Status: DISCONTINUED | OUTPATIENT
Start: 2024-09-22 | End: 2024-09-22

## 2024-09-22 RX ORDER — QUETIAPINE FUMARATE 100 MG/1
100 TABLET, FILM COATED ORAL
Status: DISCONTINUED | OUTPATIENT
Start: 2024-09-22 | End: 2024-09-23

## 2024-09-22 RX ORDER — CLOTRIMAZOLE 1 %
CREAM (GRAM) TOPICAL 2 TIMES DAILY
Status: DISCONTINUED | OUTPATIENT
Start: 2024-09-22 | End: 2024-09-30 | Stop reason: HOSPADM

## 2024-09-22 RX ORDER — DOXYCYCLINE 100 MG/1
100 CAPSULE ORAL EVERY 12 HOURS SCHEDULED
Status: DISPENSED | OUTPATIENT
Start: 2024-09-22 | End: 2024-09-29

## 2024-09-22 RX ADMIN — QUETIAPINE FUMARATE 100 MG: 100 TABLET ORAL at 22:48

## 2024-09-22 RX ADMIN — CLOTRIMAZOLE 1 APPLICATION: 1 CREAM TOPICAL at 18:05

## 2024-09-22 RX ADMIN — BUPRENORPHINE AND NALOXONE 8 MG: 8; 2 FILM BUCCAL; SUBLINGUAL at 12:32

## 2024-09-22 RX ADMIN — DOXYCYCLINE 100 MG: 100 CAPSULE ORAL at 22:48

## 2024-09-22 RX ADMIN — FLUOXETINE HYDROCHLORIDE 20 MG: 20 CAPSULE ORAL at 11:15

## 2024-09-22 RX ADMIN — DOXYCYCLINE 100 MG: 100 CAPSULE ORAL at 09:25

## 2024-09-22 RX ADMIN — CLOTRIMAZOLE: 1 CREAM TOPICAL at 09:25

## 2024-09-22 RX ADMIN — BUPRENORPHINE AND NALOXONE 8 MG: 8; 2 FILM BUCCAL; SUBLINGUAL at 19:12

## 2024-09-22 NOTE — ASSESSMENT & PLAN NOTE
PTA CBC and CMP reviewed. Repeat CBC reviewed + UA reviewed, acceptable   RPR, HbA1C, Repeat TSH/T4, Uric acid, Lipid panel, vitamin levels, repeat CMP pending   Vitals stable   UDS + for cocaine   EKG pending   Medically stable for continued inpatient psychiatric treatment based on available results

## 2024-09-22 NOTE — ASSESSMENT & PLAN NOTE
Suspect related of hx of hepatitis   AST 66, ALT 57, T bili 2.24  Repeat CMP pending   Prior CT A/P in 2014 did show mild hepatosplenomegaly + possible periportal edema   Would recommend GI follow up on discharge   No abdominal sx at this time

## 2024-09-22 NOTE — ASSESSMENT & PLAN NOTE
"Patient reports pain and swelling x all toes which has been ongoing x 2 weeks \"since I've been walking so much in the same shoes\"  Recently released from incarceration, has been homeless since and walking a lot   On exam multiple toes mildly swollen however no significant erythema, open wounds, bleeding., or ascending cellulitis of the foot. Nails with appearance of onychomycosis  Per nursing erythema was present yesterday. Patient states there was purulence from nail of R 1st toe yesterday, does not appear to be actively draining and no reported purulence today.   No fevers, chills, or systemic s/sx of infection  Given swelling and reported purulence, will trial PO doxy 100 mg BID x 7 days   Given concern for fungal infection will trial clotrimazole topically x 10 days   Given swelling, check uric acid however no hx of gout   PRN ibprofen for pain  Would recommend against frequent walking, keep open to air  Hot soaks TID bilateral feet   Please alert slim if worsening sx or no improvement, could consider podiatry consultation  "

## 2024-09-22 NOTE — TREATMENT TEAM
09/22/24 0900   Team Meeting   Meeting Type Daily Rounds   Team Members Present   Team Members Present Physician;Nurse   Physician Team Member Justo/Jenn   Nursing Team Member Jewel   Patient/Family Present   Patient Present No   Patient's Family Present No     New admit- 201, SI to O/D on street drugs, reports recent cocaine and meth use, UDS positive for cocaine, homeless. Pt reports he was released from [facility] 2 days ago. States he was on suboxone and has not had this in 3 days. Noted reddened, inflamed toes. Remains calm and cooperative. Slept well.     Readmit score-10

## 2024-09-22 NOTE — H&P
"Psychiatric Evaluation - Behavioral Health   Name: Perez Stack Jr. 53 y.o. male I MRN: [id number]  Unit/Bed#: -01 I Date of Admission: 9/21/2024   Date of Service: 9/22/2024 I Hospital Day: 1     Assessment & Plan  Bipolar disorder (HCC)  Continue Prozac 20 mg daily  Start Seroquel 100 mg at bedtime, will titrate further in coming days  Hepatitis C virus infection    Gastroesophageal reflux disease    Chronic hepatitis B virus infection (HCC)    Medical clearance for psychiatric admission    Severe stimulant use disorder (HCC)    Opioid use disorder in remission    Elevated LFTs    Toe pain    Low TSH level       Admit to [address] on 201 status for safety and treatment  No 1:1 continuous observation needed at this time, as patient feels safe on the unit.  Check admission labs.  Get collaterals.  Collaborate with family for baseline assessment and disposition planning.  Case discussed with treatment team.    Chief Complaint: \" I am having intense bad thoughts\"    History of Present Illness     Per ED provider on 9/20:\"53-year-old male, concerns for depression, methamphetamine and crack cocaine smoking. Last use just prior to arrival. States the drug use is making him feel depressed, worthless and like he does not want to be in this world. No overt plans for how he would commit suicide. \"    Per Crisis worker on 9/21:\"The patient is a 52 y/o M who was self-referred for suicidality in the context of withdrawal from cocaine and methamphetamine. The patient has a history of bipolar I disorder, depression, psychosis, heavy cocaine use, IV methamphetamine use and IV opioid use, currently in remission since 8/12/2018. UDS was positive for cocaine. BAT was negative. Patient has a history of poor outpatient compliance with treatment. He stated he has been living on the streets, has little income, his relationship with his significant other, who was a stabilizing influence, has ended, and he remains estranged " "from his family. He has had a long period of poor sleep, appetite and nutrition, and has not been tending to his ADLs. He reports depressed, irritable mood and suicidal ideation with a plan to overdose on street drugs. The patient has a history of multiple attempts of this nature, with the last being about a year ago. He denies homicidal ideation. Patient has had poor focus and concentration, decreased energy and no motivation. He does have a history of auditory hallucinations, however, he has not been experiencing them for this episode. Denies visual hallucinations. There is no evidence of delusional thinking. He does not have a current provider, nor timely access to care in the community. His  insurance coverage has lapsed.The patient would benefit from an inpatient admission for safety and stabilization. He signed a voluntary admission agreement. \"    This is 53-year-old male with history of depression/anxiety/bipolar disorder/PTSD and substance use admitted to inpatient unit on voluntary status for worsening of mood, anxiety and suicidal ideations with plan in the context of psychosocial stressors and noncompliance with treatment.  Patient endorses depressed mood, anhedonia, tiredness, lack of motivation, poor sleep, poor appetite, weight loss, hopelessness and passive death wishes.  Denies any intent or plan currently, feels safe in the hospital.  Endorses racing thoughts, irritability, mood swings and distractibility.  Denies any symptoms of psychosis currently.  Psychiatric Review Of Systems:  Medication side effects: none  Sleep: Poor  Appetite: Poor  Hygiene: able to tend to instrumental and basic ADLs  Anxiety and panic attacks: Yes  Psychotic Symptoms: denies  Depression Symptoms: Yes  Manic Symptoms: History of manic episodes  PTSD Symptoms: denies  Obsession/compulsions: Denies  Eating disorders: Denies  Suicidal Thoughts: denies  Homicidal Thoughts: denies    Medical Review Of Systems:   Complete ROS " is negative, except as noted above.    09/12/2024 09/12/2024 4 Buprenorphine 8 Mg Tablet Sl 30.00 15 Al Ole [id number] Samson ([id number]) 0 16.00 mg Private Pay PA   08/14/2024 08/14/2024 6 Buprenorphine 8 Mg Tablet Sl 15.00 15 Pa Fol [id number] Samson ([id number]) 0 8.00 mg Private Pay PA   08/07/2024 08/07/2024 4 Buprenorphine 8 Mg Tablet Sl 30.00 30 [facility] [id number] Samson ([id number]) 0 8.00 mg Private Pay PA   02/28/2024 02/28/2024 6 Buprenorphine 2 Mg Tablet Sl 2.00 2 Pa Fol [id number] Samson ([id number]) 0 2.00 mg Private Pay PA   02/28/2024 02/28/2024 6 Buprenorphine 2 Mg Tablet Sl 6.00 2 Pa Fol [id number] Samson ([id number]) 0 6.00 mg Private Pay PA   02/12/2024 12/29/2023 6 Buprenorphine 8 Mg Tablet Sl 15.00 15 Pa Fol [id number] Samson ([id number]) 0 8.00 mg Private Pay PA   01/31/2024 12/29/2023 6 Buprenorphine 8 Mg Tablet Sl 15.00 15 Pa Fol [id number] Samson ([id number]) 0 8.00 mg Private Pay PA     Scheduled medications:  Current Facility-Administered Medications   Medication Dose Route Frequency Provider Last Rate    benztropine  1 mg Oral Q4H PRN Max 6/day Shan Kemp MD      clotrimazole   Topical BID Maile Munoz PA-C      hydrOXYzine HCL  50 mg Oral Q6H PRN Max 4/day Shan Kemp MD      Or    diphenhydrAMINE  50 mg Intramuscular Q6H PRN Shan Kemp MD      doxycycline hyclate  100 mg Oral Q12H [facility] Maile Munoz PA-C      FLUoxetine  20 mg Oral Daily Shan Kemp MD      hydrOXYzine HCL  100 mg Oral Q6H PRN Max 4/day Shan Kemp MD      Or    LORazepam  2 mg Intramuscular Q6H PRN MD Earlene Blockne HCL  25 mg Oral Q6H PRN Max 4/day Shan Kemp MD      ibuprofen  400 mg Oral Q4H PRN Shan Kemp MD      ibuprofen  600 mg Oral Q6H PRN Shan Kemp MD      ibuprofen  800 mg Oral Q8H PRN Shan Kemp MD      OLANZapine  10 mg Oral Q3H PRN Max  3/day Shan Kemp MD      Or    OLANZapine  10 mg Intramuscular Q3H PRN Max 3/day Shan Kemp MD      OLANZapine  5 mg Oral Q3H PRN Max 6/day Shan Kemp MD      Or    OLANZapine  5 mg Intramuscular Q3H PRN Max 6/day Shan Kemp MD      OLANZapine  2.5 mg Oral Q3H PRN Max 8/day Shan Kemp MD      polyethylene glycol  17 g Oral Daily PRN Shan Kemp MD      QUEtiapine  100 mg Oral HS Shan Kemp MD      senna-docusate sodium  1 tablet Oral Daily PRN Shan Kemp MD      traZODone  50 mg Oral HS PRN Shan Kemp MD          PRN:    benztropine    hydrOXYzine HCL **OR** diphenhydrAMINE    hydrOXYzine HCL **OR** LORazepam    hydrOXYzine HCL    ibuprofen    ibuprofen    ibuprofen    OLANZapine **OR** OLANZapine    OLANZapine **OR** OLANZapine    OLANZapine    polyethylene glycol    senna-docusate sodium    traZODone    Diet:       Diet Orders   (From admission, onward)                 Start     Ordered    09/21/24 1431  Diet Regular; Regular House  Diet effective now        References:    Adult Nutrition Support Algorithm    RD Therapeutic Diet Order Protocol   Question Answer Comment   Diet Type Regular    Regular Regular House    RD to adjust diet per protocol? No        09/21/24 1430                     - Observation: routine            - VS: as per unit protocol  - Legal status: 201  - Psychoeducation (benefits and potential risks) discussed, importance of compliance with the psychiatric treatment reiterated, and the patient verbalized understanding of the matter  - Encourage group attendance and milieu therapy   - The pt was educated and agreed to verbalize any suicidal thoughts, frustrations or concerns to the nursing staff, immediately.   - Dispo: To be determined            Historical Information     Psychiatric History:   Psychiatry  "diagnosis: Bipolar/depression/anxiety/PTSD  Inpatient Hx: Yes  Suicidal Hx: History of overdosing multiple times  Self harming behavior Hx: Yes cutting  Violent behavior Hx: Denies  Access to firearms: Denies  Outpatient Hx: None currently  Medications/Trials: Seroquel, Prozac, Suboxone      Substance Abuse History:  Reports history of cocaine, meth and cannabis use.  UDS positive for cocaine.  Denies any recent alcohol use  Social History     Substance and Sexual Activity   Alcohol Use No     Social History     Substance and Sexual Activity   Drug Use Yes    Types: Cocaine, \"Crack\" cocaine, Methamphetamines, Heroin    Comment: UDS +Cocaine       Family Psychiatric History:   Family History   Problem Relation Age of Onset    No Known Problems Mother     No Known Problems Father     No Known Problems Sister     No Known Problems Brother     No Known Problems Maternal Aunt     No Known Problems Paternal Aunt     No Known Problems Maternal Uncle     No Known Problems Paternal Uncle     No Known Problems Maternal Grandfather     No Known Problems Maternal Grandmother     No Known Problems Paternal Grandfather     No Known Problems Paternal Grandmother     No Known Problems Cousin     ADD / ADHD Neg Hx     Alcohol abuse Neg Hx     Anxiety disorder Neg Hx     Bipolar disorder Neg Hx     Dementia Neg Hx     Depression Neg Hx     Drug abuse Neg Hx     OCD Neg Hx     Paranoid behavior Neg Hx     Schizophrenia Neg Hx     Seizures Neg Hx     Self-Injury Neg Hx     Suicide Attempts Neg Hx        Social History:  Social History     Socioeconomic History    Marital status: Single     Spouse name: Not on file    Number of children: Not on file    Years of education: Not on file    Highest education level: Not on file   Occupational History    Not on file   Tobacco Use    Smoking status: Some Days     Current packs/day: 0.25     Types: Cigarettes    Smokeless tobacco: Former   Vaping Use    Vaping status: Former    Substances: " "Nicotine, Flavoring   Substance and Sexual Activity    Alcohol use: No    Drug use: Yes     Types: Cocaine, \"Crack\" cocaine, Methamphetamines, Heroin     Comment: UDS +Cocaine    Sexual activity: Not Currently   Other Topics Concern    Not on file   Social History Narrative    Not on file     Social Determinants of Health     Financial Resource Strain: Not on file   Food Insecurity: Not on file   Transportation Needs: Not on file   Physical Activity: Not on file   Stress: Not on file   Social Connections: Not on file   Intimate Partner Violence: Not on file   Housing Stability: Not on file       Living arrangement: Homeless  Occupational History: Unemployed  Functioning Relationships: Limited  Legal issues: Denies   hx:None      Traumatic History:   Abuse:Denies  Other Traumatic Events: None    Past Medical History:   Diagnosis Date    Addiction to drug (HCC)     Anxiety     Bipolar I disorder, most recent episode depressed, severe with psychotic features (HCC) 02/23/2016    Depression     Foot infection     Hallucination     Hepatitis C virus infection 02/23/2016    has had treatment    Psychiatric illness     Psychosis (HCC)     Self-injurious behavior     Substance abuse (HCC)     Suicide attempt (HCC)     Withdrawal symptoms, drug or narcotic (HCC)        Medical Review Of Systems:  Pertinent items are noted in HPI; all others negative    Meds/Allergies   all current active meds have been reviewed  Allergies   Allergen Reactions    Acetaminophen Vomiting     Pt. States it will hurt his liver       Objective      Mental Status Evaluation:  Appearance and attitude: disheveled, dressed in hospital attire, thin & gaunt looking, with good hygiene  Eye contact: poor  Motor Function: within normal limits, intact gait, No PMA/PMR  Gait/station: normal gait/station  Speech: talking in soft tone, with normal latency and amount and talking in low tone  Language: No overt abnormality  Mood/affect: depressed, anxious " / Affect was blunted, constricted  Thought Processes: sequential and goal-directed, logical  Thought content: denies suicidal ideation or homicidal ideation; no delusions or first rank symptoms  Associations: intact associations  Perceptual disturbances: denies Auditory/Visual/Tactile Hallucinations  Orientation: oriented to time, person, place and to the situational context  Cognitive Function: intact  Memory: recent and remote memory grossly intact  Intellect: average  Fund of knowledge: aware of current events, aware of past history, and vocabulary average  Impulse control: fair  Insight/judgment: fair/fair      Lab results: I have personally reviewed all pertinent laboratory/tests results  Most Recent Labs:   Lab Results   Component Value Date    WBC 3.30 (L) 09/22/2024    RBC 3.96 09/22/2024    HGB 12.6 09/22/2024    HCT 36.2 (L) 09/22/2024     09/22/2024    RDW 15.3 (H) 09/22/2024    NEUTROABS 1.79 (L) 09/22/2024    SODIUM 137 09/20/2024    K 3.2 (L) 09/20/2024    CL 99 09/20/2024    CO2 31 09/20/2024    BUN 19 09/20/2024    CREATININE 0.78 09/20/2024    GLUC 147 (H) 09/20/2024    CALCIUM 8.9 09/20/2024    AST 66 (H) 09/20/2024    ALT 57 (H) 09/20/2024    ALKPHOS 62 09/20/2024    TP 6.7 09/20/2024    ALB 4.1 09/20/2024    TBILI 2.24 (H) 09/20/2024    CHOLESTEROL 112 05/03/2023    HDL 51 05/03/2023    TRIG 60 05/03/2023    LDLCALC 49 05/03/2023    NONHDLC 61 05/03/2023    AMMONIA 38 (H) 04/06/2014    CTO3TRTEQIIA 0.437 (L) 09/20/2024    FREET4 0.82 10/20/2015    T3FREE 2.22 (L) 10/20/2015    SYPHILISAB Non-reactive 04/08/2023    HGBA1C 4.3 04/08/2023    EAG 77 04/08/2023       Imaging Studies: No results found.    EKG, Pathology, and Other Studies: Reviewed.    Code Status: Level 1 - Full Code    Advance Directive and Living Will: <no information>    Patient Strengths/Assets: ability for insight, cooperative, patient is on a voluntary commitment    Patient Barriers/Limitations: difficulty adapting,  homeless, limited support system, self-care deficit, substance abuse    Suicide/Homicide Risk Assessment:    Risk of Harm to Self:   Nursing Suicide Risk Assessment Last 24 hours: C-SSRS Risk (Since Last Contact)  Calculated C-SSRS Risk Score (Since Last Contact): Low Risk    Risk of Harm to Others:  Nursing Homicide Risk Assessment: Violence Risk to Others: Denies within past 6 months    The following interventions are recommended: behavioral checks every 7 minutes, continued hospitalization on locked unit    Counseling / Coordination of Care:    Diagnosis, medication changes and treatment plan reviewed with patient.  Patient's presentation on admission and proposed treatment plan discussed with staff.  Events leading to admission reviewed with patient.  Importance of medication and treatment compliance reviewed with patient.  Discussed with patient need for drug and alcohol counseling after discharge.  Supportive therapy provided to patient.    Inpatient Psychiatric Certification:    Estimated length of stay: 7 midnights          This note was completed in part utilizing Dragon dictation Software. Grammatical, translation, syntax errors, random word insertions, spelling mistakes, and incomplete sentences may be an occasional consequence of this system secondary to software limitations with voice recognition, ambient noise, and hardware issues. If you have any questions or concerns about the content, text, or information contained within the body of this dictation, please contact the provider for clarification.

## 2024-09-22 NOTE — ASSESSMENT & PLAN NOTE
"Per chart review patient has hx of HCV abd HBV   Unclear if this has been previously treated, per chart review patient is \"s/p failed treatment\"  LFTs elevated as below  Patient should be given referral to GI for close outpatient follow up on discharge   "

## 2024-09-22 NOTE — TREATMENT PLAN
TREATMENT PLAN REVIEW - Behavioral Health Perez Stack Jr. 53 y.o. 1970 male MRN: [id number]    [facility] IP BEHAVIORAL [facility] Room / Bed: RUST 212/RUST 212-01 Encounter: [id number]          Admit Date/Time:  9/21/2024  2:20 PM    Treatment Team:   MD Pete Block, MIRELLA Mooney, MIRELLA Carballo, MIRELLA Astorga, MIRELLA Mckeon, MIRELLA Holloway, MIRELLA Rojas    Diagnosis: Principal Problem:    Bipolar disorder (HCC)  Active Problems:    Hepatitis C virus infection    Gastroesophageal reflux disease    Chronic hepatitis B virus infection (HCC)    Medical clearance for psychiatric admission    Severe stimulant use disorder (HCC)    Opioid use disorder in remission    Elevated LFTs    Toe pain      Patient Strengths/Assets: adapts well, cooperative, motivation for treatment/growth, patient is on a voluntary commitment    Patient Barriers/Limitations: financial instability, homeless, limited support system, noncompliant with treatment, substance abuse    Short Term Goals: decrease in depressive symptoms, decrease in anxiety symptoms, decrease in suicidal thoughts, decrease in self abusive behaviors, improvement in insight, improvement in self care, sleep improvement, improvement in appetite, mood stabilization    Long Term Goals: improvement in depression, improvement in anxiety, resolution of depressive symptoms, resolution of manic symptoms, stabilization of mood, free of suicidal thoughts, improved insight, adequate self care, adequate sleep, adequate appetite, adequate oral intake    Progress Towards Goals: starting psychiatric medications as prescribed, improving, attends groups, mood is stabilizing gradually, less agitated, less anxious, less depressed    Recommended Treatment: medication management, patient medication education, group therapy, milieu therapy, continued Behavioral Health  psychiatric evaluation/assessment process    Treatment Frequency: daily medication monitoring, group and milieu therapy daily, monitoring through interdisciplinary rounds, monitoring through weekly patient care conferences    Expected Discharge Date:  5-7 days    Discharge Plan: referral for outpatient medication management with a psychiatrist, referral for outpatient psychotherapy, referral to outpatient drug and alcohol rehabilitation program    Treatment Plan Created/Updated By: Shan Kemp MD

## 2024-09-22 NOTE — NURSING NOTE
"Pt is seclusive to room and resting in bed under blankets on approach.  Pt just received suboxone dose at time of interaction and was still feeling poorly d/t dose being overdue.  Minimally cooperative with assessment, scant and irritable.  Admits to having SI although does not or is unable to elaborate.  When asked, pt states \"I don't know\" and \"I just don't feel right.\"  Pt states he just wants to lay under the covers.  Pt agreeable that he is able to alert staff if feeling unsafe.  Pt previously soaked feet in basin per SLIM.  "

## 2024-09-22 NOTE — CONSULTS
"Consultation - Hospitalist   Name: Perez Stack Jr. 53 y.o. male I MRN: [id number]  Unit/Bed#: -01 I Date of Admission: 9/21/2024   Date of Service: 9/22/2024 I Hospital Day: 1   Inpatient consult for Medical Clearance for  patient  Consult performed by: Maile Munoz PA-C  Consult ordered by: Shan Kemp MD        Physician Requesting Evaluation: Shan Allen*   Reason for Evaluation / Principal Problem: Medical clearance for inpatient psychiatric treatment    Assessment & Plan  Hepatitis C virus infection  Per chart review patient has hx of HCV abd HBV   Unclear if this has been previously treated, per chart review patient is \"s/p failed treatment\"  LFTs elevated as below  Patient should be given referral to GI for close outpatient follow up on discharge   Medical clearance for psychiatric admission  PTA CBC and CMP reviewed. Repeat CBC reviewed + UA reviewed, acceptable   RPR, HbA1C, Repeat TSH/T4, Uric acid, Lipid panel, vitamin levels, repeat CMP pending   Vitals stable   UDS + for cocaine   EKG pending   Medically stable for continued inpatient psychiatric treatment based on available results  Opioid use disorder in remission  Patient reports he is chronically on Subutex 8 mg twice daily  Denies recent concurrent opioid use  Elevated LFTs  Suspect related of hx of hepatitis   AST 66, ALT 57, T bili 2.24  Repeat CMP pending   Prior CT A/P in 2014 did show mild hepatosplenomegaly + possible periportal edema   Would recommend GI follow up on discharge   No abdominal sx at this time   Toe pain  Patient reports pain and swelling x all toes which has been ongoing x 2 weeks \"since I've been walking so much in the same shoes\"  Recently released from incarceration, has been homeless since and walking a lot   On exam multiple toes mildly swollen however no significant erythema, open wounds, bleeding., or ascending cellulitis of the foot. Nails with appearance of " onychomycosis  Per nursing erythema was present yesterday. Patient states there was purulence from nail of R 1st toe yesterday, does not appear to be actively draining and no reported purulence today.   No fevers, chills, or systemic s/sx of infection  Given swelling and reported purulence, will trial PO doxy 100 mg BID x 7 days   Given concern for fungal infection will trial clotrimazole topically x 10 days   Given swelling, check uric acid however no hx of gout   PRN ibprofen for pain  Would recommend against frequent walking, keep open to air  Hot soaks TID bilateral feet   Please alert slim if worsening sx or no improvement, could consider podiatry consultation  Bipolar disorder (HCC)    Gastroesophageal reflux disease    Chronic hepatitis B virus infection (HCC)    Severe stimulant use disorder (HCC)    Low TSH level  TSH low  No history of thyroid disorder  Repeat TSH and T4 pending        Counseling / Coordination of Care Time: 30 minutes.  Greater than 50% of total time spent on patient counseling and coordination of care.    Collaboration of Care: Were Recommendations Directly Discussed with Primary Treatment Team? - No     History of Present Illness:    Perez Stack Jr. is a 53 y.o. male with PMH of homelessness, chronic hepatitis, history of substance use disorder, depression who is originally admitted to the psychiatry service due to 2014 depression/substance use. We are consulted for medical clearance for admission to Behavioral Health Unit and treatment of underlying psychiatric illness.      Patient is ultimately a poor historian regarding his medications/medical history.  He reports he does take Subutex 8 mg twice daily as well as Prozac.  He does not know if he is taking any additional medications, states other medical history includes arthritis of his left knee for which he was supposed to have outpatient cortisone shots.  Patient reports he is currently using cocaine/methamphetamines, denies  alcohol use or concurrent opioid use.  Patient reports he has been having pain in all 10 toes for the past 2 weeks, which he reports began due to walking frequently due to homelessness with poor footwear.  He reports he has history of infections in his feet in the past, believes they are currently infected, reported warmth, purulence especially from right first toe nailbed yesterday.  Denies any purulence today, however notes he still experiences pain.  He denies any fevers or chills, chest pain, cough, sore throat, chest pain, nausea vomiting, abdominal pain, diarrhea, urinary symptoms, or other rashes/wounds.  Labs and vitals reviewed, based on all available data patient is medically stable to continue inpatient psychiatric treatment.    Review of Systems:    Review of Systems   Constitutional:  Negative for chills, fatigue and fever.   HENT:  Negative for congestion, rhinorrhea and sore throat.    Eyes:  Negative for visual disturbance.   Respiratory:  Negative for cough, chest tightness, shortness of breath and wheezing.    Cardiovascular:  Negative for chest pain, palpitations and leg swelling.   Gastrointestinal:  Negative for abdominal pain, constipation, diarrhea, nausea and vomiting.   Genitourinary:  Negative for difficulty urinating, dysuria and frequency.   Musculoskeletal:  Negative for arthralgias and myalgias.        Pain in toes and left knee   Skin:  Positive for rash and wound.   Neurological:  Negative for dizziness, light-headedness and headaches.   Psychiatric/Behavioral:  Positive for dysphoric mood.    All other systems reviewed and are negative.      Past Medical and Surgical History:     Past Medical History:   Diagnosis Date    Addiction to drug ([facility])     Anxiety     Bipolar I disorder, most recent episode depressed, severe with psychotic features ([facility]) 02/23/2016    Depression     Foot infection     Hallucination     Hepatitis C virus infection 02/23/2016    has had treatment     "Psychiatric illness     Psychosis (HCC)     Self-injurious behavior     Substance abuse (HCC)     Suicide attempt (HCC)     Withdrawal symptoms, drug or narcotic (HCC)        No past surgical history on file.    Meds/Allergies:    all medications and allergies reviewed    Allergies:   Allergies   Allergen Reactions    Acetaminophen Vomiting     Pt. States it will hurt his liver       Social History:     Marital Status: Single    Substance Use History:   Social History     Substance and Sexual Activity   Alcohol Use No     Social History     Tobacco Use   Smoking Status Some Days    Current packs/day: 0.25    Types: Cigarettes   Smokeless Tobacco Former     Social History     Substance and Sexual Activity   Drug Use Yes    Types: Cocaine, \"Crack\" cocaine, Methamphetamines, Heroin    Comment: UDS +Cocaine       Family History:    Family history non-contributory    Physical Exam:     Vitals:   Blood Pressure: 159/94 (09/21/24 1542)  Pulse: 77 (09/21/24 1542)  Temperature: 98.1 °F (36.7 °C) (09/21/24 1542)  Temp Source: Tympanic (09/21/24 1542)  Respirations: 18 (09/21/24 1542)  Height: 5' 7\" (170.2 cm) (09/21/24 1423)  Weight - Scale: 58.1 kg (128 lb) (09/22/24 0945)  SpO2: 100 % (09/21/24 1542)    Physical Exam  Vitals and nursing note reviewed.   Constitutional:       General: He is not in acute distress.  HENT:      Head: Normocephalic and atraumatic.   Eyes:      General:         Right eye: No discharge.         Left eye: No discharge.      Extraocular Movements: Extraocular movements intact.      Conjunctiva/sclera: Conjunctivae normal.   Cardiovascular:      Rate and Rhythm: Normal rate and regular rhythm.      Heart sounds: Normal heart sounds. No murmur heard.  Pulmonary:      Effort: Pulmonary effort is normal. No respiratory distress.      Breath sounds: Normal breath sounds. No wheezing, rhonchi or rales.   Abdominal:      General: Bowel sounds are normal.      Palpations: Abdomen is soft.      Tenderness: " There is no abdominal tenderness. There is no guarding.   Musculoskeletal:      Right lower leg: No edema.      Left lower leg: No edema.   Skin:     General: Skin is warm and dry.      Comments: Toes examined.  Several toes with mild swelling, greatest in right first toe.  Toenails consistent with onychomycosis infection.  No open wounds/ulcerations, no active bleeding.  No evidence of purulence at this time.  Mild erythema around nailbed especially of right first toe however no evidence of cellulitis extending up either foot.   Neurological:      General: No focal deficit present.      Mental Status: He is alert and oriented to person, place, and time. Mental status is at baseline.      Cranial Nerves: No cranial nerve deficit.   Psychiatric:         Mood and Affect: Mood normal.         Behavior: Behavior normal.         Additional Data:     Lab Results: No pertinent imaging studies reviewed.    Results from last 7 days   Lab Units 09/22/24  0612   WBC Thousand/uL 3.30*   HEMOGLOBIN g/dL 12.6   HEMATOCRIT % 36.2*   PLATELETS Thousands/uL 231   SEGS PCT % 53   LYMPHO PCT % 29   MONO PCT % 11   EOS PCT % 5     Results from last 7 days   Lab Units 09/20/24  1410   SODIUM mmol/L 137   POTASSIUM mmol/L 3.2*   CHLORIDE mmol/L 99   CO2 mmol/L 31   BUN mg/dL 19   CREATININE mg/dL 0.78   ANION GAP mmol/L 7   CALCIUM mg/dL 8.9   ALBUMIN g/dL 4.1   TOTAL BILIRUBIN mg/dL 2.24*   ALK PHOS U/L 62   ALT U/L 57*   AST U/L 66*   GLUCOSE RANDOM mg/dL 147*             Lab Results   Component Value Date/Time    HGBA1C 4.3 04/08/2023 07:42 AM           EKG, Pathology, and Other Studies Reviewed on Admission:   EKG pending     ** Please Note: This note has been constructed using a voice recognition system. **

## 2024-09-22 NOTE — PLAN OF CARE
Problem: SELF HARM/SUICIDALITY  Goal: Will have no self-injury during hospital stay  Description: INTERVENTIONS:  - Q 15 MINUTES: Routine safety checks  - Q WAKING SHIFT & PRN: Assess risk to determine if routine checks are adequate to maintain patient safety  - Encourage patient to participate actively in care by formulating a plan to combat response to suicidal ideation, identify supports and resources  Outcome: Progressing     Problem: DEPRESSION  Goal: Will be euthymic at discharge  Description: INTERVENTIONS:  - Administer medication as ordered  - Provide emotional support via 1:1 interaction with staff  - Encourage involvement in milieu/groups/activities  - Monitor for social isolation  Outcome: Progressing     Problem: BEHAVIOR  Goal: Pt/Family maintain appropriate behavior and adhere to behavioral management agreement, if implemented  Description: INTERVENTIONS:  - Assess the family dynamic   - Encourage verbalization of thoughts and concerns in a socially appropriate manner  - Assess patient/family's coping skills and non-compliant behavior (including use of illegal substances).  - Utilize positive, consistent limit setting strategies supporting safety of patient, staff and others  - Initiate consult with Case Management, Spiritual Care or other ancillary services as appropriate  - If a patient's/visitor's behavior jeopardizes the safety of the patient, staff, or others, refer to organization procedure.   - Notify Security of behavior or suspected illegal substances which indicate the need for search of the patient and/or belongings  - Encourage participation in the decision making process about a behavioral management agreement; implement if patient meets criteria  Outcome: Progressing     Problem: ANXIETY  Goal: Will report anxiety at manageable levels  Description: INTERVENTIONS:  - Administer medication as ordered  - Teach and encourage coping skills  - Provide emotional support  - Assess  patient/family for anxiety and ability to cope  Outcome: Progressing  Goal: By discharge: Patient will verbalize 2 strategies to deal with anxiety  Description: Interventions:  - Identify any obvious source/trigger to anxiety  - Staff will assist patient in applying identified coping technique/skills  - Encourage attendance of scheduled groups and activities  Outcome: Progressing     Problem: SLEEP DISTURBANCE  Goal: Will exhibit normal sleeping pattern  Description: Interventions:  -  Assess the patients sleep pattern, noting recent changes  - Administer medication as ordered  - Decrease environmental stimuli, including noise, as appropriate during the night  - Encourage the patient to actively participate in unit groups and or exercise during the day to enhance ability to achieve adequate sleep at night  - Assess the patient, in the morning, encouraging a description of sleep experience  Outcome: Progressing     Problem: DISCHARGE PLANNING  Goal: Discharge to home or other facility with appropriate resources  Description: INTERVENTIONS:  - Identify barriers to discharge w/patient and caregiver  - Arrange for needed discharge resources and transportation as appropriate  - Identify discharge learning needs (meds, wound care, etc.)  - Arrange for interpretive services to assist at discharge as needed  - Refer to Case Management Department for coordinating discharge planning if the patient needs post-hospital services based on physician/advanced practitioner order or complex needs related to functional status, cognitive ability, or social support system  Outcome: Progressing     Problem: Ineffective Coping  Goal: Participates in unit activities  Description: Interventions:  - Provide therapeutic environment   - Provide required programming   - Redirect inappropriate behaviors   Outcome: Not Progressing     Problem: PAIN - ADULT  Goal: Verbalizes/displays adequate comfort level or baseline comfort level  Description:  Interventions:  - Encourage patient to monitor pain and request assistance  - Assess pain using appropriate pain scale  - Administer analgesics based on type and severity of pain and evaluate response  - Implement non-pharmacological measures as appropriate and evaluate response  - Consider cultural and social influences on pain and pain management  - Notify physician/advanced practitioner if interventions unsuccessful or patient reports new pain  Outcome: Progressing     Problem: INFECTION - ADULT  Goal: Absence or prevention of progression during hospitalization  Description: INTERVENTIONS:  - Assess and monitor for signs and symptoms of infection  - Monitor lab/diagnostic results  - Monitor all insertion sites, i.e. indwelling lines, tubes, and drains  - Monitor endotracheal if appropriate and nasal secretions for changes in amount and color  - [place] appropriate cooling/warming therapies per order  - Administer medications as ordered  - Instruct and encourage patient and family to use good hand hygiene technique  - Identify and instruct in appropriate isolation precautions for identified infection/condition  Outcome: Progressing  Goal: Absence of fever/infection during neutropenic period  Description: INTERVENTIONS:  - Monitor WBC    Outcome: Progressing     Problem: SAFETY ADULT  Goal: Patient will remain free of falls  Description: INTERVENTIONS:  - Educate patient/family on patient safety including physical limitations  - Instruct patient to call for assistance with activity   - Consult OT/PT to assist with strengthening/mobility   - Keep Call bell within reach  - Keep bed low and locked with side rails adjusted as appropriate  - Keep care items and personal belongings within reach  - Initiate and maintain comfort rounds  - Make Fall Risk Sign visible to staff  - Offer Toileting every  Hours, in advance of need  - Initiate/Maintain alarm  - Obtain necessary fall risk management equipment:   - Apply yellow  socks and bracelet for high fall risk patients  - Consider moving patient to room near nurses station  Outcome: Progressing     Problem: Prexisting or High Potential for Compromised Skin Integrity  Goal: Skin integrity is maintained or improved  Description: INTERVENTIONS:  - Identify patients at risk for skin breakdown  - Assess and monitor skin integrity  - Assess and monitor nutrition and hydration status  - Monitor labs   - Assess for incontinence   - Turn and reposition patient  - Assist with mobility/ambulation  - Relieve pressure over bony prominences  - Avoid friction and shearing  - Provide appropriate hygiene as needed including keeping skin clean and dry  - Evaluate need for skin moisturizer/barrier cream  - Collaborate with interdisciplinary team   - Patient/family teaching  - Consider wound care consult   Outcome: Progressing

## 2024-09-22 NOTE — ASSESSMENT & PLAN NOTE
Continue Prozac 20 mg daily  Start Seroquel 100 mg at bedtime, will titrate further in coming days

## 2024-09-22 NOTE — NURSING NOTE
Pt in room on approach. Tearful r/t being homeless and relationship stressors. Continues to endorse thoughts to hurt self but will approach staff if feeling unsafe. Doesn't feel ready for discharge at this time. Plan of care ongoing.

## 2024-09-22 NOTE — ASSESSMENT & PLAN NOTE
Patient reports he is chronically on Subutex 8 mg twice daily  Denies recent concurrent opioid use

## 2024-09-23 LAB — TREPONEMA PALLIDUM IGG+IGM AB [PRESENCE] IN SERUM OR PLASMA BY IMMUNOASSAY: NORMAL

## 2024-09-23 PROCEDURE — 99232 SBSQ HOSP IP/OBS MODERATE 35: CPT | Performed by: STUDENT IN AN ORGANIZED HEALTH CARE EDUCATION/TRAINING PROGRAM

## 2024-09-23 PROCEDURE — 86780 TREPONEMA PALLIDUM: CPT | Performed by: STUDENT IN AN ORGANIZED HEALTH CARE EDUCATION/TRAINING PROGRAM

## 2024-09-23 RX ORDER — DIPHENHYDRAMINE HYDROCHLORIDE AND LIDOCAINE HYDROCHLORIDE AND ALUMINUM HYDROXIDE AND MAGNESIUM HYDRO
10 KIT EVERY 4 HOURS PRN
Status: DISCONTINUED | OUTPATIENT
Start: 2024-09-23 | End: 2024-09-24

## 2024-09-23 RX ADMIN — CLOTRIMAZOLE 1 APPLICATION: 1 CREAM TOPICAL at 17:01

## 2024-09-23 RX ADMIN — CLOTRIMAZOLE: 1 CREAM TOPICAL at 09:36

## 2024-09-23 RX ADMIN — FLUOXETINE HYDROCHLORIDE 20 MG: 20 CAPSULE ORAL at 09:35

## 2024-09-23 RX ADMIN — BUPRENORPHINE AND NALOXONE 8 MG: 8; 2 FILM BUCCAL; SUBLINGUAL at 19:26

## 2024-09-23 RX ADMIN — BUPRENORPHINE AND NALOXONE 8 MG: 8; 2 FILM BUCCAL; SUBLINGUAL at 06:30

## 2024-09-23 RX ADMIN — DOXYCYCLINE 100 MG: 100 CAPSULE ORAL at 09:35

## 2024-09-23 RX ADMIN — DIPHENHYDRAMINE HYDROCHLORIDE AND LIDOCAINE HYDROCHLORIDE AND ALUMINUM HYDROXIDE AND MAGNESIUM HYDRO 10 ML: KIT at 19:28

## 2024-09-23 NOTE — NURSING NOTE
Pt has been mostly seclusive to room and resting with blanket over his head today.  Irritable edge at times, appearing to be r/t unit noise.  Pt is vague regarding symptoms, expresses passive SI.  Provided with basin of warm water for soaking feet which pt was cooperative with.

## 2024-09-23 NOTE — PLAN OF CARE
Problem: SELF HARM/SUICIDALITY  Goal: Will have no self-injury during hospital stay  Description: INTERVENTIONS:  - Q 15 MINUTES: Routine safety checks  - Q WAKING SHIFT & PRN: Assess risk to determine if routine checks are adequate to maintain patient safety  - Encourage patient to participate actively in care by formulating a plan to combat response to suicidal ideation, identify supports and resources  Outcome: Progressing     Problem: DEPRESSION  Goal: Will be euthymic at discharge  Description: INTERVENTIONS:  - Administer medication as ordered  - Provide emotional support via 1:1 interaction with staff  - Encourage involvement in milieu/groups/activities  - Monitor for social isolation  Outcome: Progressing     Problem: BEHAVIOR  Goal: Pt/Family maintain appropriate behavior and adhere to behavioral management agreement, if implemented  Description: INTERVENTIONS:  - Assess the family dynamic   - Encourage verbalization of thoughts and concerns in a socially appropriate manner  - Assess patient/family's coping skills and non-compliant behavior (including use of illegal substances).  - Utilize positive, consistent limit setting strategies supporting safety of patient, staff and others  - Initiate consult with Case Management, Spiritual Care or other ancillary services as appropriate  - If a patient's/visitor's behavior jeopardizes the safety of the patient, staff, or others, refer to organization procedure.   - Notify Security of behavior or suspected illegal substances which indicate the need for search of the patient and/or belongings  - Encourage participation in the decision making process about a behavioral management agreement; implement if patient meets criteria  Outcome: Progressing     Problem: ANXIETY  Goal: Will report anxiety at manageable levels  Description: INTERVENTIONS:  - Administer medication as ordered  - Teach and encourage coping skills  - Provide emotional support  - Assess  patient/family for anxiety and ability to cope  Outcome: Progressing  Goal: By discharge: Patient will verbalize 2 strategies to deal with anxiety  Description: Interventions:  - Identify any obvious source/trigger to anxiety  - Staff will assist patient in applying identified coping technique/skills  - Encourage attendance of scheduled groups and activities  Outcome: Not Progressing     Problem: SLEEP DISTURBANCE  Goal: Will exhibit normal sleeping pattern  Description: Interventions:  -  Assess the patients sleep pattern, noting recent changes  - Administer medication as ordered  - Decrease environmental stimuli, including noise, as appropriate during the night  - Encourage the patient to actively participate in unit groups and or exercise during the day to enhance ability to achieve adequate sleep at night  - Assess the patient, in the morning, encouraging a description of sleep experience  Outcome: Progressing     Problem: DISCHARGE PLANNING  Goal: Discharge to home or other facility with appropriate resources  Description: INTERVENTIONS:  - Identify barriers to discharge w/patient and caregiver  - Arrange for needed discharge resources and transportation as appropriate  - Identify discharge learning needs (meds, wound care, etc.)  - Arrange for interpretive services to assist at discharge as needed  - Refer to Case Management Department for coordinating discharge planning if the patient needs post-hospital services based on physician/advanced practitioner order or complex needs related to functional status, cognitive ability, or social support system  Outcome: Progressing     Problem: Ineffective Coping  Goal: Participates in unit activities  Description: Interventions:  - Provide therapeutic environment   - Provide required programming   - Redirect inappropriate behaviors   Outcome: Progressing     Problem: PAIN - ADULT  Goal: Verbalizes/displays adequate comfort level or baseline comfort level  Description:  Interventions:  - Encourage patient to monitor pain and request assistance  - Assess pain using appropriate pain scale  - Administer analgesics based on type and severity of pain and evaluate response  - Implement non-pharmacological measures as appropriate and evaluate response  - Consider cultural and social influences on pain and pain management  - Notify physician/advanced practitioner if interventions unsuccessful or patient reports new pain  Outcome: Progressing     Problem: DISCHARGE PLANNING  Goal: Discharge to home or other facility with appropriate resources  Description: INTERVENTIONS:  - Identify barriers to discharge w/patient and caregiver  - Arrange for needed discharge resources and transportation as appropriate  - Identify discharge learning needs (meds, wound care, etc.)  - Arrange for interpretive services to assist at discharge as needed  - Refer to Case Management Department for coordinating discharge planning if the patient needs post-hospital services based on physician/advanced practitioner order or complex needs related to functional status, cognitive ability, or social support system  Outcome: Progressing     Problem: Ineffective Coping  Goal: Participates in unit activities  Description: Interventions:  - Provide therapeutic environment   - Provide required programming   - Redirect inappropriate behaviors   Outcome: Progressing     Problem: PAIN - ADULT  Goal: Verbalizes/displays adequate comfort level or baseline comfort level  Description: Interventions:  - Encourage patient to monitor pain and request assistance  - Assess pain using appropriate pain scale  - Administer analgesics based on type and severity of pain and evaluate response  - Implement non-pharmacological measures as appropriate and evaluate response  - Consider cultural and social influences on pain and pain management  - Notify physician/advanced practitioner if interventions unsuccessful or patient reports new  pain  Outcome: Progressing     Problem: INFECTION - ADULT  Goal: Absence or prevention of progression during hospitalization  Description: INTERVENTIONS:  - Assess and monitor for signs and symptoms of infection  - Monitor lab/diagnostic results  - Monitor all insertion sites, i.e. indwelling lines, tubes, and drains  - Monitor endotracheal if appropriate and nasal secretions for changes in amount and color  - Fisher appropriate cooling/warming therapies per order  - Administer medications as ordered  - Instruct and encourage patient and family to use good hand hygiene technique  - Identify and instruct in appropriate isolation precautions for identified infection/condition  Outcome: Progressing  Goal: Absence of fever/infection during neutropenic period  Description: INTERVENTIONS:  - Monitor WBC    Outcome: Progressing     Problem: SAFETY ADULT  Goal: Patient will remain free of falls  Description: INTERVENTIONS:  - Educate patient/family on patient safety including physical limitations  - Instruct patient to call for assistance with activity   - Consult OT/PT to assist with strengthening/mobility   - Keep Call bell within reach  - Keep bed low and locked with side rails adjusted as appropriate  - Keep care items and personal belongings within reach  - Initiate and maintain comfort rounds  - Make Fall Risk Sign visible to staff  - Offer Toileting every  Hours, in advance of need  - Initiate/Maintain alarm  - Obtain necessary fall risk management equipment:   Problem: Prexisting or High Potential for Compromised Skin Integrity  Goal: Skin integrity is maintained or improved  Description: INTERVENTIONS:  - Identify patients at risk for skin breakdown  - Assess and monitor skin integrity  - Assess and monitor nutrition and hydration status  - Monitor labs   - Assess for incontinence   - Turn and reposition patient  - Assist with mobility/ambulation  - Relieve pressure over bony prominences  - Avoid friction and  shearing  - Provide appropriate hygiene as needed including keeping skin clean and dry  - Evaluate need for skin moisturizer/barrier cream  - Collaborate with interdisciplinary team   - Patient/family teaching  - Consider wound care consult   Outcome: Progressing     - Apply yellow socks and bracelet for high fall risk patients  - Consider moving patient to room near nurses station  Outcome: Progressing

## 2024-09-23 NOTE — NURSING NOTE
"As Wrap-up Group was preparing to start this evening, patients were assisting this Writer, retrieving additional chairs from the small room to the large activity room.  This patient was laying in a chair, half body laying on arm of another chair, and feet/legs propped up on a foot stool.  When another patient attempted to retrieve a chair and had to push it past the foot stool (apparently slightly bumping the footstool), this patient removed blanket from his head and jumped up, cursing at the helpful patient.  Writer asked him to please stop cursing and suggested he go to his room if he wanted to sleep because Group was ready to begin.  Instead, this patient sat up and continued to complain about the other patient disturbing him and bumping into his leg on foot stool.  Writer was attempting to bring another chair into the room when this patient attempted to grab the chair and shove it away from him and into this Writer.  At that point, Writer looked at patient and said, \"please do not try to push that chair at me - or anyone else\".  After that statement, patient simply put blanket back over his head, slouched into the chair and remained covered up during the entire 60 minute group session.  He never responded to discussion nor did he change his position.  "

## 2024-09-23 NOTE — ASSESSMENT & PLAN NOTE
Continue Prozac 20 mg daily  Increase Seroquel 150 mg at bedtime, will titrate further in coming days  Consult nutrition

## 2024-09-23 NOTE — NURSING NOTE
Called to [facility] lab regarding outstanding lab orders from this morning. Spoke with Justin, inquiring about blood sample sent this morning if it is adequate to add current outstanding lab orders. Per Justin, patient's sample is currently in [facility] lab, call transferred to [facility]. Spoke with Rosanna, Vitamin B12, folate,and Vitamin D 25 samples can be added to sample. Syphilis screening lab order needs to be collected from patient as it the sample needs to be sent out to an outside lab.

## 2024-09-23 NOTE — PROGRESS NOTES
Progress Note - Behavioral Health   Name: Perez Stack Jr. 53 y.o. male I MRN: [id number]  Unit/Bed#: -01 I Date of Admission: 9/21/2024   Date of Service: 9/23/2024 I Hospital Day: 2     Assessment & Plan  Bipolar disorder (HCC)  Continue Prozac 20 mg daily  Increase Seroquel 150 mg at bedtime, will titrate further in coming days  Consult nutrition  Hepatitis C virus infection    Gastroesophageal reflux disease    Chronic hepatitis B virus infection (HCC)    Medical clearance for psychiatric admission    Severe stimulant use disorder (HCC)    Opioid use disorder in remission    Elevated LFTs    Toe pain    Low TSH level      Recommended Treatment: Continue with pharmacotherapy, group therapy, milieu therapy and occupational therapy.   Risks, benefits and possible side effects of Medications:   Risks, benefits, alternatives, and possible side effects of patient's psychiatric medications were discussed with patient.    Progress Toward Goals: Progressing. Patient is not at goal. They are not yet ready for discharge. The patient's condition currently requires active psychopharmacological medication management, interdisciplinary coordination with case management, and the utilization of adjunctive milieu and group therapy to augment psychopharmacological efficacy. The patient's risk of morbidity, and progression or decompensation of psychiatric disease, is higher without this current treatment.    Subjective: Patient was seen, chart was reviewed, and case was discussed with the team.  Patient appears withdrawn, disheveled, seclusive, soft and depressed patient endorses depressed mood, lack of motivation, low energy, hopelessness and passive death wishes.  He feels safe in the hospital.  Reports poor appetite and losing 30 pounds.  Wants to go back on ensures. Patient is compliant with medications. Patient denied adverse effects to their current psychiatric medication regimen. Discussed the importance of continuing  to take medications as prescribed, as well as the importance of continuing to attend groups on the unit.    Behavior over the last 24 hours:  unchanged  Sleep: normal  Appetite: normal  Medication side effects: No    Medical ROS: Pertinent items are noted in HPI.no complaints      Scheduled medications:  Current Facility-Administered Medications   Medication Dose Route Frequency Provider Last Rate    benztropine  1 mg Oral Q4H PRN Max 6/day Shan Kemp MD      buprenorphine-naloxone  8 mg Sublingual BID Shan Kemp MD      clotrimazole   Topical BID Maile Munoz PA-C      hydrOXYzine HCL  50 mg Oral Q6H PRN Max 4/day Shan Kemp MD      Or    diphenhydrAMINE  50 mg Intramuscular Q6H PRN Shan Kemp MD      doxycycline hyclate  100 mg Oral Q12H ISAI Maile Munoz PA-C      FLUoxetine  20 mg Oral Daily Shan Kemp MD      hydrOXYzine HCL  100 mg Oral Q6H PRN Max 4/day Shan Kemp MD      Or    LORazepam  2 mg Intramuscular Q6H PRN Shan Kemp MD      hydrOXYzine HCL  25 mg Oral Q6H PRN Max 4/day Shan Kemp MD      ibuprofen  400 mg Oral Q4H PRN Shan Kemp MD      ibuprofen  600 mg Oral Q6H PRN Shan Kemp MD      ibuprofen  800 mg Oral Q8H PRN Shan Kemp MD      OLANZapine  10 mg Oral Q3H PRN Max 3/day Shan Kemp MD      Or    OLANZapine  10 mg Intramuscular Q3H PRN Max 3/day Shan Kemp MD      OLANZapine  5 mg Oral Q3H PRN Max 6/day Shan Kemp MD      Or    OLANZapine  5 mg Intramuscular Q3H PRN Max 6/day Shan Kemp MD      OLANZapine  2.5 mg Oral Q3H PRN Max 8/day Shan Kemp MD      polyethylene glycol  17 g Oral Daily PRN Shan Kemp MD       QUEtiapine  100 mg Oral HS Shan Kemp MD      senna-docusate sodium  1 tablet Oral Daily PRN Shan Kemp MD      traZODone  50 mg Oral HS PRN Shan Kemp MD        PRN:    benztropine    hydrOXYzine HCL **OR** diphenhydrAMINE    hydrOXYzine HCL **OR** LORazepam    hydrOXYzine HCL    ibuprofen    ibuprofen    ibuprofen    OLANZapine **OR** OLANZapine    OLANZapine **OR** OLANZapine    OLANZapine    polyethylene glycol    senna-docusate sodium    traZODone    - Observation: routine            - VS: as per unit protocol  - Legal status: 201  - Diet: Regular diet  - Encourage group attendance and milieu therapy  - Dispo: To be determined        Objective    Current Mental Status Evaluation:  Appearance and attitude: appeared as stated age, dressed in hospital attire, with good hygiene  Eye contact: poor  Motor Function: within normal limits, intact gait, No PMA/PMR  Gait/station: normal gait/station  Speech: talking in soft tone, with normal latency and amount and talking in low tone  Language: No overt abnormality  Mood/affect: depressed, anxious / Affect was constricted but reactive, mood congruent, blunted  Thought Processes: sequential and goal-directed, logical  Thought content: denies suicidal ideation or homicidal ideation; no delusions or first rank symptoms  Associations: intact associations  Perceptual disturbances: denies Auditory/Visual/Tactile Hallucinations  Orientation: oriented to time, person, place and to the situational context  Cognitive Function: intact  Memory: recent and remote memory grossly intact  Intellect: average  Fund of knowledge: aware of current events, aware of past history, and vocabulary average  Impulse control: fair  Insight/judgment: fair/fair      Vital signs in last 24 hours:    Temp:  [96.9 °F (36.1 °C)-97.5 °F (36.4 °C)] 97.5 °F (36.4 °C)  HR:  [66-83] 66  Resp:  [18] 18  BP: (107-154)/(61-97) 107/61    Lab Results: I  have reviewed the following results:   Most Recent Labs:   Lab Results   Component Value Date    WBC 3.30 (L) 09/22/2024    RBC 3.96 09/22/2024    HGB 12.6 09/22/2024    HCT 36.2 (L) 09/22/2024     09/22/2024    RDW 15.3 (H) 09/22/2024    NEUTROABS 1.79 (L) 09/22/2024    SODIUM 146 09/22/2024    K 4.1 09/22/2024     (H) 09/22/2024    CO2 31 09/22/2024    BUN 16 09/22/2024    CREATININE 0.72 09/22/2024    GLUC 89 09/22/2024    GLUF 87 05/03/2023    CALCIUM 9.2 09/22/2024    AST 35 09/22/2024    ALT 38 09/22/2024    ALKPHOS 55 09/22/2024    TP 5.9 (L) 09/22/2024    ALB 3.7 09/22/2024    TBILI 1.12 (H) 09/22/2024    CHOLESTEROL 100 09/22/2024    HDL 42 09/22/2024    TRIG 51 09/22/2024    LDLCALC 48 09/22/2024    NONHDLC 58 09/22/2024    AMMONIA 38 (H) 04/06/2014    CPS9RALZQQPF 0.462 09/22/2024    FREET4 0.82 10/20/2015    T3FREE 2.22 (L) 10/20/2015    RPR Non-Reactive 03/17/2016    HGBA1C 4.2 09/22/2024    EAG 74 09/22/2024       Counseling / Coordination of Care  Patient's progress discussed with staff in treatment team meeting.  Medications, treatment progress and treatment plan reviewed with patient.  Medication education provided to patient.  Supportive therapy provided to patient.  Cognitive techniques utilized during the session.  Reassurance and supportive therapy provided.  Encouraged participation in milieu and group therapy on the unit.  Crisis/safety plan discussed with patient.        This note was completed in part utilizing Dragon dictation Software. Grammatical, translation, syntax errors, random word insertions, spelling mistakes, and incomplete sentences may be an occasional consequence of this system secondary to software limitations with voice recognition, ambient noise, and hardware issues. If you have any questions or concerns about the content, text, or information contained within the body of this dictation, please contact the provider for clarification.

## 2024-09-23 NOTE — NURSING NOTE
Pt refused to say if he was having SI , stated he didn't  want us to know his thoughts C/O tooth ache, medicated for tooth pain as directed, will continue to monitor  behaviors, has attitude of entitlement at times

## 2024-09-23 NOTE — PLAN OF CARE
Pt has attended some therapeutic programming activities.    Problem: Ineffective Coping  Goal: Participates in unit activities  Description: Interventions:  - Provide therapeutic environment   - Provide required programming   - Redirect inappropriate behaviors   Outcome: Progressing

## 2024-09-23 NOTE — NURSING NOTE
"Pt is very rude at times to staff. At 1620 pt was yelling in the hallway \" Where is the food at.\" This writer said it will here soon. \"  \" At times the gentleman who drops off the food comes right at 1630 .\"  Pt response was \" oh ight .\"  This writer assured pt \"that no matter what we will  make sure pts will eat we just have to be patient, as the food gets dropped off to us from another hospital. \"   "

## 2024-09-24 PROCEDURE — 99232 SBSQ HOSP IP/OBS MODERATE 35: CPT | Performed by: STUDENT IN AN ORGANIZED HEALTH CARE EDUCATION/TRAINING PROGRAM

## 2024-09-24 PROCEDURE — 93005 ELECTROCARDIOGRAM TRACING: CPT

## 2024-09-24 RX ORDER — MAGNESIUM HYDROXIDE/ALUMINUM HYDROXICE/SIMETHICONE 120; 1200; 1200 MG/30ML; MG/30ML; MG/30ML
30 SUSPENSION ORAL EVERY 4 HOURS PRN
Status: DISCONTINUED | OUTPATIENT
Start: 2024-09-24 | End: 2024-09-30 | Stop reason: HOSPADM

## 2024-09-24 RX ORDER — DIPHENHYDRAMINE HYDROCHLORIDE AND LIDOCAINE HYDROCHLORIDE AND ALUMINUM HYDROXIDE AND MAGNESIUM HYDRO
10 KIT EVERY 4 HOURS PRN
Status: DISCONTINUED | OUTPATIENT
Start: 2024-09-24 | End: 2024-09-30 | Stop reason: HOSPADM

## 2024-09-24 RX ORDER — CALCIUM CARBONATE 500 MG/1
500 TABLET, CHEWABLE ORAL DAILY PRN
Status: DISCONTINUED | OUTPATIENT
Start: 2024-09-24 | End: 2024-09-24

## 2024-09-24 RX ADMIN — FLUOXETINE HYDROCHLORIDE 20 MG: 20 CAPSULE ORAL at 09:11

## 2024-09-24 RX ADMIN — BUPRENORPHINE AND NALOXONE 8 MG: 8; 2 FILM BUCCAL; SUBLINGUAL at 06:01

## 2024-09-24 RX ADMIN — BUPRENORPHINE AND NALOXONE 8 MG: 8; 2 FILM BUCCAL; SUBLINGUAL at 18:01

## 2024-09-24 RX ADMIN — DIPHENHYDRAMINE HYDROCHLORIDE AND LIDOCAINE HYDROCHLORIDE AND ALUMINUM HYDROXIDE AND MAGNESIUM HYDRO 10 ML: KIT at 21:21

## 2024-09-24 RX ADMIN — QUETIAPINE FUMARATE 150 MG: 100 TABLET ORAL at 21:18

## 2024-09-24 RX ADMIN — OLANZAPINE 5 MG: 5 TABLET, FILM COATED ORAL at 03:33

## 2024-09-24 RX ADMIN — CLOTRIMAZOLE: 1 CREAM TOPICAL at 18:04

## 2024-09-24 RX ADMIN — ALUMINUM HYDROXIDE, MAGNESIUM HYDROXIDE, AND SIMETHICONE 30 ML: 1200; 120; 1200 SUSPENSION ORAL at 12:29

## 2024-09-24 RX ADMIN — HYDROXYZINE HYDROCHLORIDE 100 MG: 50 TABLET, FILM COATED ORAL at 03:33

## 2024-09-24 NOTE — NURSING NOTE
Sleeping for long intervals can be irritable if needs are not met immediately, denies SI at this time will definitely let staff know of any issues

## 2024-09-24 NOTE — PLAN OF CARE
Problem: DISCHARGE PLANNING  Goal: Discharge to home or other facility with appropriate resources  Description: INTERVENTIONS:  - Identify barriers to discharge w/patient and caregiver  - Arrange for needed discharge resources and transportation as appropriate  - Identify discharge learning needs (meds, wound care, etc.)  - Arrange for interpretive services to assist at discharge as needed  - Refer to Case Management Department for coordinating discharge planning if the patient needs post-hospital services based on physician/advanced practitioner order or complex needs related to functional status, cognitive ability, or social support system  Outcome: Progressing   - CM met with patient and went over Intake, HEATHER's, and Tx Plan.    Pt is a 201

## 2024-09-24 NOTE — NURSING NOTE
"Pt reported \"stabbing chest pain\" to a staff member who alerted the provider. Pt had a STAT ECG performed which was WNL. Pt still c/o pain and reports that it worsens when laying down. Pt was given Maalox PRN. Upon follow up pt sleeping, appearing in no distress, however, did wake up to tell writer that the medicine was ineffective. When asked if pt would like something else, pt was irritable and stated, \"no, just to be left alone.\" Provider made aware and asked that pt be placed on medical board for tomorrow morning.   "

## 2024-09-24 NOTE — MALNUTRITION/BMI
This medical record reflects one or more clinical indicators suggestive of malnutrition and/or morbid obesity.    Malnutrition Findings:   Adult Malnutrition type: Chronic illness  Adult Degree of Malnutrition: Other severe protein calorie malnutrition  Malnutrition Characteristics: Inadequate energy, Weight loss                360 Statement: Pt presents with severe protein calorie malnutriion as evidenced by <75% po intake > 1 month and 13 lb (9%) wt loss in 4 months (5/31/24 141 lbs and 9/24/24 128 lbs). Treat with diet and Ensure High Protein BID.    BMI Findings:           Body mass index is 20.05 kg/m².     See Nutrition note dated 9/24/24  for additional details.  Completed nutrition assessment is viewable in the nutrition documentation.

## 2024-09-24 NOTE — CASE MANAGEMENT
CM attempted to meet with patient to complete Intake. Patient observed sleeping. CM will attempt to meet at a later time.

## 2024-09-24 NOTE — DISCHARGE INSTR - OTHER ORDERS
[facility] is a confidential 7 days/week telephone support service manned by trained mental health consumers.??[facility] operates daily but?is not able to accept?calls between?2AM-6AM. ? [facility] provides support, a listening ear and can provide information about available services. [facility] specializes in the concerns of mental health consumers, their families and friends.? However, we are also here for anyone who has a mental health concern, is confused about or just doesn't know anything about mental health or where to get information. To reach [facility], call [phone] accepts calls between 6:00 AM to 10:00 AM and from 4:00 PM to 12:00 AM.    Text CONNECT to 521760 from anywhere in the [place], anytime, about any type of crisis.  A live, trained Crisis Counselor receives the text and lets you know that they are here to listen.  The volunteer Crisis Counselor will help you move from a hot moment to a cool moment.   [facility] Crisis Intervention - licensed telephone and mobile crisis services that provide mental health assessments to all age groups regardless of income or insurance.? Crisis Intervention operates 24-hour/7 days a week. [facility] Crisis Intervention assists consumer who are experiencing a mental health emergency and lack the resources to assist themselves.? Immediate intervention for suicidal and depressed individuals with home visits/outreach being top priority. Crisis can be contacted at [phone].    The National [place] [employer] Mental Illness (ARMINDA) offers various education & support groups for you & your family.  For more information visit their website at    http://www.arminda-lv.org/.   Dial 2-1-1 to get connected/get help.  Free, confidential information & referral available 24/7: Aging Services, Child & Youth Services, Counseling, Education/Training, Food/Shelter/Clothing, Health Services, Parenting, Substance Abuse, Support Groups, Volunteer Opportunities, & much more.   Phone: 2-1-1 or  [phone], Web: www.bt704cjtq.org, Email: 211@[facility]   The [facility] ([address] [place] [id number]) offers persons with a mental illness a safe, healing environment to explore their personal and vocational potential and receive support in achieving their goals. Instead of traditional therapy, the work of the house is the rehabilitation. As members contribute meaningful work to the house, they build confidence and a sense of purpose.  Be a Member - It’s Free   Membership in the [facility] is open to any [facility] resident who is 18 or older and has a history of mental illness. Membership is free for life.      [address] [address]   Hours: Monday - Friday: 8 a.m. - 4 p.m.    With varying hours on holidays    [phone]      Drop-In Center   The Drop-In [facility] ([address] [place] [address]) provides a stress-free atmosphere for persons 18 and older who have experienced mental health issues.   What The Drop-In Center Offers   Activities   Games   Outings   Holiday gatherings   Recovery   Employment   Education   Community Resources   Empowerment   Helps participants achieve their goals   Enhances quality of life   Encourages leadership      The Drop-In Center    [address] [place] [address]   Hours: Monday through Friday: 4 p.m. - 9 p.m.   Saturday: 2 p.m. - 8 p.m.   Closed Sundays   Varying hours on holidays             Contact [phone]      Support & Referral Helpline   Support and Referral Helpline is a 24-hour, 7 days-a-week, listening and referral service provided to [facility].   Please call [phone] or tty [phone] to speak with one of our specialists.   The helpline is possible through partnerships with the [place] Department of Human Services and [facility].         The 988 Suicide &  Crisis Lifeline (formerly known as the National Suicide Prevention Lifeline) provides free and confidential emotional support to people in suicidal crisis or emotional distress 24 hours a day, 7 days a week, across the [place]. The Lifeline is comprised of a national network of over 200 local crisis centers, combining custom local care and resources with national standards and best practices.

## 2024-09-24 NOTE — PLAN OF CARE
Problem: SELF HARM/SUICIDALITY  Goal: Will have no self-injury during hospital stay  Description: INTERVENTIONS:  - Q 15 MINUTES: Routine safety checks  - Q WAKING SHIFT & PRN: Assess risk to determine if routine checks are adequate to maintain patient safety  - Encourage patient to participate actively in care by formulating a plan to combat response to suicidal ideation, identify supports and resources  Outcome: Progressing     Problem: DEPRESSION  Goal: Will be euthymic at discharge  Description: INTERVENTIONS:  - Administer medication as ordered  - Provide emotional support via 1:1 interaction with staff  - Encourage involvement in milieu/groups/activities  - Monitor for social isolation  Outcome: Progressing     Problem: BEHAVIOR  Goal: Pt/Family maintain appropriate behavior and adhere to behavioral management agreement, if implemented  Description: INTERVENTIONS:  - Assess the family dynamic   - Encourage verbalization of thoughts and concerns in a socially appropriate manner  - Assess patient/family's coping skills and non-compliant behavior (including use of illegal substances).  - Utilize positive, consistent limit setting strategies supporting safety of patient, staff and others  - Initiate consult with Case Management, Spiritual Care or other ancillary services as appropriate  - If a patient's/visitor's behavior jeopardizes the safety of the patient, staff, or others, refer to organization procedure.   - Notify Security of behavior or suspected illegal substances which indicate the need for search of the patient and/or belongings  - Encourage participation in the decision making process about a behavioral management agreement; implement if patient meets criteria  Outcome: Progressing     Problem: ANXIETY  Goal: Will report anxiety at manageable levels  Description: INTERVENTIONS:  - Administer medication as ordered  - Teach and encourage coping skills  - Provide emotional support  - Assess  patient/family for anxiety and ability to cope  Outcome: Progressing  Goal: By discharge: Patient will verbalize 2 strategies to deal with anxiety  Description: Interventions:  - Identify any obvious source/trigger to anxiety  - Staff will assist patient in applying identified coping technique/skills  - Encourage attendance of scheduled groups and activities  Outcome: Progressing     Problem: SLEEP DISTURBANCE  Goal: Will exhibit normal sleeping pattern  Description: Interventions:  -  Assess the patients sleep pattern, noting recent changes  - Administer medication as ordered  - Decrease environmental stimuli, including noise, as appropriate during the night  - Encourage the patient to actively participate in unit groups and or exercise during the day to enhance ability to achieve adequate sleep at night  - Assess the patient, in the morning, encouraging a description of sleep experience  Outcome: Progressing     Problem: Ineffective Coping  Goal: Participates in unit activities  Description: Interventions:  - Provide therapeutic environment   - Provide required programming   - Redirect inappropriate behaviors   Outcome: Progressing     Problem: PAIN - ADULT  Goal: Verbalizes/displays adequate comfort level or baseline comfort level  Description: Interventions:  - Encourage patient to monitor pain and request assistance  - Assess pain using appropriate pain scale  - Administer analgesics based on type and severity of pain and evaluate response  - Implement non-pharmacological measures as appropriate and evaluate response  - Consider cultural and social influences on pain and pain management  - Notify physician/advanced practitioner if interventions unsuccessful or patient reports new pain  Outcome: Progressing     Problem: INFECTION - ADULT  Goal: Absence or prevention of progression during hospitalization  Description: INTERVENTIONS:  - Assess and monitor for signs and symptoms of infection  - Monitor  lab/diagnostic results  - Monitor all insertion sites, i.e. indwelling lines, tubes, and drains  - Monitor endotracheal if appropriate and nasal secretions for changes in amount and color  - Dugger appropriate cooling/warming therapies per order  - Administer medications as ordered  - Instruct and encourage patient and family to use good hand hygiene technique  - Identify and instruct in appropriate isolation precautions for identified infection/condition  Outcome: Progressing  Goal: Absence of fever/infection during neutropenic period  Description: INTERVENTIONS:  - Monitor WBC    Outcome: Progressing     Problem: SAFETY ADULT  Goal: Patient will remain free of falls  Description: INTERVENTIONS:  - Educate patient/family on patient safety including physical limitations  - Instruct patient to call for assistance with activity   - Consult OT/PT to assist with strengthening/mobility   - Keep Call bell within reach  - Keep bed low and locked with side rails adjusted as appropriate  - Keep care items and personal belongings within reach  - Initiate and maintain comfort rounds  - Make Fall Risk Sign visible to staff  - Apply yellow socks and bracelet for high fall risk patients  - Consider moving patient to room near nurses station  Outcome: Progressing     Problem: Prexisting or High Potential for Compromised Skin Integrity  Goal: Skin integrity is maintained or improved  Description: INTERVENTIONS:  - Identify patients at risk for skin breakdown  - Assess and monitor skin integrity  - Assess and monitor nutrition and hydration status  - Monitor labs   - Assess for incontinence   - Turn and reposition patient  - Assist with mobility/ambulation  - Relieve pressure over bony prominences  - Avoid friction and shearing  - Provide appropriate hygiene as needed including keeping skin clean and dry  - Evaluate need for skin moisturizer/barrier cream  - Collaborate with interdisciplinary team   - Patient/family teaching  -  Consider wound care consult   Outcome: Progressing

## 2024-09-24 NOTE — NURSING NOTE
Pt is seclusive to room and napping throughout the day.  Following lunch, pt reports feeling as though a pill is stuck and having pain when he swallows (pt pointing to his chest).  Reports no relief after drinking fluids.  Per provider, EKG completed and pt encouraged to drink cup of water.  Pt admits to worsening discomfort when laying down.  Maalox ordered and administered.  Pt to be seen by [facility] for eval if further tx needed such as daily med for heartburn/reflux.    Pt declines soaking feet at this time.  Plans to try to take a nap and will do so upon waking.  Cooperative but scant.  Denies SI, appears disinterested in discussing further.

## 2024-09-24 NOTE — NURSING NOTE
"Patient with irritable affect this evening. Preez approached the medication window, loudly yelling about tooth pain, attempting to speak over patient receiving meds at window. Patient was told he couldn't have requested PRN magic mouthwash, as parameters are q4h and it had been less than 2 hours. Perez was offered PRN Ibuprofen, hot/ice pack and became irate, cursing and stormed away from desk, slamming door. Patient refused HS medications, including Seroquel and Vibramycin, stating, \"I only wanted fucking pain meds.\" Perez was again offered PRN Ibuprofen and again yelled at staff, declining offer. Patient declined to soak feet in warm water, as instructed by [facility] provider.   "

## 2024-09-24 NOTE — PROGRESS NOTES
Reviewed Diagnosis of: Principal Problem:    Bipolar disorder (HCC)  Active Problems:    Hepatitis C virus infection    Gastroesophageal reflux disease    Chronic hepatitis B virus infection (HCC)    Medical clearance for psychiatric admission    Severe stimulant use disorder (HCC)    Opioid use disorder in remission    Elevated LFTs    Toe pain      Reviewed Short Term Goals of: decrease in depressive symptoms, decrease in anxiety symptoms, decrease in suicidal thoughts, decrease in self abusive behaviors, improvement in insight, improvement in self care, sleep improvement, improvement in appetite, mood stabilization       All parties in agreement.       09/24/24 1301   Team Meeting   Meeting Type Tx Team Meeting   Initial Conference Date 09/24/24   Next Conference Date 10/23/24   Team Members Present   Team Members Present Physician;Nurse;   Physician Team Member Dr. Kemp   [facility]   Care Management Team Member Garrett   Patient/Family Present   Patient Present No  (pt declined to participate)   Patient's Family Present No

## 2024-09-24 NOTE — NURSING NOTE
"Patient requesting to speak to this writer. Stating \"I was asleep for night meds and I want my Seroquel so I can sleep.\" This writer explained the medication scheduling and that it was too close to this AM dose of Seroquel. Patient became frustrated and loud about not being able to fall back asleep and started to pace unit with clenched fists. This writer offered patient Atarax for his anxiety and Zyprexa for his mild agitation. At one hour follow up patient alseep in bed with even and unlabored respirations.   "

## 2024-09-24 NOTE — NUTRITION
"   09/24/24 1455   Recommendations/Interventions   Summary Per GI outpt note 5/31/24: \"He reports chronic history of solid food dysphagia and now a 22 pound unintentional weight loss over the last 6 months.  Etiology for his dysphagia includes mechanical obstruction related to esophageal stricture, EOE, esophagitis or esophageal web, versus underlying motility disorder.  I recommend scheduling diagnostic EGD for further evaluation.\"  EGD 7/26/24: + esophagus dilation and bx's normal. Was prescribed protonix as outpt, on admission pt reported not taking.   Interventions/Recommendations Continue current diet order;Supplement adjust   Recommendations to Provider Suggest changing current supplement to Ensure High Protein BID. Please change RD protocol to yes so RD can adjust supplements.   Education Assessment   Education Education not indicated at this time   Patient Nutrition Goals   Goal Adequate intake;Avoid weight loss   Goal Status Initiated   Timeframe to complete goal by next f/u   Nutrition Complexity Risk   Nutrition complexity level Moderate risk   Follow up date 09/30/24       "

## 2024-09-24 NOTE — DISCHARGE INSTR - APPOINTMENTS
You have confirmed and will be discharged to address [address] [place] [address].  You will be contacted at your confirmed phone number of [phone].    Sophia our Behavioral Health Nurse Navigator, will be calling you after your discharge, on the phone number that you provided.  She will be available as an additional support, if needed.   If you wish to speak with Sophia, you may contact her at [phone].

## 2024-09-24 NOTE — PROGRESS NOTES
"Progress Note - Behavioral Health   Name: Perez Stack Jr. 53 y.o. male I MRN: [id number]  Unit/Bed#: -01 I Date of Admission: 9/21/2024   Date of Service: 9/24/2024 I Hospital Day: 3     Assessment & Plan  Bipolar disorder (HCC)  Continue Prozac 20 mg daily  Increase Seroquel 150 mg at bedtime, will titrate further in coming days  Consult nutrition  Hepatitis C virus infection    Gastroesophageal reflux disease    Chronic hepatitis B virus infection (HCC)    Medical clearance for psychiatric admission    Severe stimulant use disorder (HCC)    Opioid use disorder in remission    Elevated LFTs    Toe pain    Low TSH level      Recommended Treatment: Continue with pharmacotherapy, group therapy, milieu therapy and occupational therapy.   Risks, benefits and possible side effects of Medications:   Risks, benefits, alternatives, and possible side effects of patient's psychiatric medications were discussed with patient.    Progress Toward Goals: Progressing. Patient is not at goal. They are not yet ready for discharge. The patient's condition currently requires active psychopharmacological medication management, interdisciplinary coordination with case management, and the utilization of adjunctive milieu and group therapy to augment psychopharmacological efficacy. The patient's risk of morbidity, and progression or decompensation of psychiatric disease, is higher without this current treatment.    Subjective: Patient was seen, chart was reviewed, and case was discussed with the team.  Patient says \" I am keeping out of my head I still have bad thoughts\" patient continues to endorse depressed mood, racing thoughts, anxiety, hopelessness and passive death wishes.  Denies any intent or plan.  Endorses chest discomfort after eating or drinking or while taking pills since yesterday.  EKG was done and received Maalox. Patient is compliant with medications. Patient denied adverse effects to their current psychiatric " medication regimen. Discussed the importance of continuing to take medications as prescribed, as well as the importance of continuing to attend groups on the unit.    Behavior over the last 24 hours:  improved  Sleep: normal  Appetite: normal  Medication side effects: No    Medical ROS: Pertinent items are noted in HPI.all other systems are negative      Scheduled medications:  Current Facility-Administered Medications   Medication Dose Route Frequency Provider Last Rate    aluminum-magnesium hydroxide-simethicone  30 mL Oral Q4H PRN Shan Kemp MD      benztropine  1 mg Oral Q4H PRN Max 6/day Shan Kemp MD      buprenorphine-naloxone  8 mg Sublingual BID Shan Kemp MD      clotrimazole   Topical BID Maile Munoz PA-C      hydrOXYzine HCL  50 mg Oral Q6H PRN Max 4/day Shan Kemp MD      Or    diphenhydrAMINE  50 mg Intramuscular Q6H PRN Shan Kemp MD      doxycycline hyclate  100 mg Oral Q12H ISAI Maile Munoz PA-C      FLUoxetine  20 mg Oral Daily Shan Kemp MD      hydrOXYzine HCL  100 mg Oral Q6H PRN Max 4/day Shan Kemp MD      Or    LORazepam  2 mg Intramuscular Q6H PRN Shan Kemp MD      hydrOXYzine HCL  25 mg Oral Q6H PRN Max 4/day Shan Kemp MD      ibuprofen  400 mg Oral Q4H PRN Shan Kemp MD      ibuprofen  600 mg Oral Q6H PRN Shan Kemp MD      ibuprofen  800 mg Oral Q8H PRN Shan Kemp MD      OLANZapine  10 mg Oral Q3H PRN Max 3/day Shan Kemp MD      Or    OLANZapine  10 mg Intramuscular Q3H PRN Max 3/day Shan Kemp MD      OLANZapine  5 mg Oral Q3H PRN Max 6/day Shan Kemp MD      Or    OLANZapine  5 mg Intramuscular Q3H PRN Max 6/day Shan Kemp  MD      OLANZapine  2.5 mg Oral Q3H PRN Max 8/day Shan Kemp MD      polyethylene glycol  17 g Oral Daily PRN Shan Kemp MD      QUEtiapine  150 mg Oral HS Shan Kemp MD      senna-docusate sodium  1 tablet Oral Daily PRN Shan Kemp MD      traZODone  50 mg Oral HS PRN Shan Kemp MD        PRN:    aluminum-magnesium hydroxide-simethicone    benztropine    hydrOXYzine HCL **OR** diphenhydrAMINE    hydrOXYzine HCL **OR** LORazepam    hydrOXYzine HCL    ibuprofen    ibuprofen    ibuprofen    OLANZapine **OR** OLANZapine    OLANZapine **OR** OLANZapine    OLANZapine    polyethylene glycol    senna-docusate sodium    traZODone    - Observation: routine            - VS: as per unit protocol  - Legal status: 201  - Diet: Regular diet  - Encourage group attendance and milieu therapy  - Dispo: To be determined        Objective    Current Mental Status Evaluation:  Appearance and attitude: appeared as stated age, dressed in hospital attire, with good hygiene  Eye contact: fair  Motor Function: within normal limits, intact gait, No PMA/PMR  Gait/station: normal gait/station  Speech: normal for rate, rhythm, volume, latency, amount  Language: No overt abnormality  Mood/affect: depressed, anxious / Affect was constricted but reactive, mood congruent  Thought Processes: sequential and goal-directed, logical  Thought content: denies suicidal ideation or homicidal ideation; no delusions or first rank symptoms  Associations: intact associations  Perceptual disturbances: denies Auditory/Visual/Tactile Hallucinations  Orientation: oriented to time, person, place and to the situational context  Cognitive Function: intact  Memory: recent and remote memory grossly intact  Intellect: average  Fund of knowledge: aware of current events, aware of past history, and vocabulary average  Impulse control: fair  Insight/judgment:  fair/fair      Vital signs in last 24 hours:    Temp:  [97.6 °F (36.4 °C)-98 °F (36.7 °C)] 98 °F (36.7 °C)  HR:  [79-93] 79  Resp:  [18] 18  BP: (139-154)/(60-90) 139/60    Lab Results: I have reviewed the following results:   Last Laboratory Results with Depakote and/or Tegretol levels:   Lab Results   Component Value Date    WBC 3.30 (L) 09/22/2024    RBC 3.96 09/22/2024    HGB 12.6 09/22/2024    HCT 36.2 (L) 09/22/2024     09/22/2024    RDW 15.3 (H) 09/22/2024    NEUTROABS 1.79 (L) 09/22/2024    SODIUM 146 09/22/2024    K 4.1 09/22/2024     (H) 09/22/2024    CO2 31 09/22/2024    BUN 16 09/22/2024    CREATININE 0.72 09/22/2024    GLUC 89 09/22/2024    GLUF 87 05/03/2023    CALCIUM 9.2 09/22/2024    AST 35 09/22/2024    ALT 38 09/22/2024    ALKPHOS 55 09/22/2024    TP 5.9 (L) 09/22/2024    ALB 3.7 09/22/2024    TBILI 1.12 (H) 09/22/2024       Counseling / Coordination of Care  Patient's progress discussed with staff in treatment team meeting.  Medications, treatment progress and treatment plan reviewed with patient.  Medication education provided to patient.  Supportive therapy provided to patient.  Cognitive techniques utilized during the session.  Reassurance and supportive therapy provided.  Encouraged participation in milieu and group therapy on the unit.  Crisis/safety plan discussed with patient.        This note was completed in part utilizing Dragon dictation Software. Grammatical, translation, syntax errors, random word insertions, spelling mistakes, and incomplete sentences may be an occasional consequence of this system secondary to software limitations with voice recognition, ambient noise, and hardware issues. If you have any questions or concerns about the content, text, or information contained within the body of this dictation, please contact the provider for clarification.

## 2024-09-24 NOTE — CASE MANAGEMENT
Intake    Social Determinants reviewed with patient       Admit Date/Time: 24 at 1420   : 1970  Perez Stack Jr.  Discharge Date: TBD    Treatment Plan:     Reviewed     Confirmed Address:    [facility]:  [place]      Email: [email or web address]                                [address] [facility] [address]    Resides in the home with:     Patient reports he resides with his aunt     Will Return Home at Discharge:     Patient would like to discharge to a inpatient rehab    Confirmed Phone Number:        [phone] (M)     Marital Status:   Children:     Single   No kids     Family History:              Parents:                          Siblings:   Patient reports his parents are           4    Commitment Status:      Status Changes:   201      No Changes     Admitted from:     [facility]     Presenting problem:                  Patient reports that he felt SI but did not have a plan.   As per ED Note:            53-year-old male, concerns for depression, methamphetamine and crack cocaine smoking. Last use just prior to arrival. States the drug use is making him feel depressed, worthless and like he does not want to be in this world. No overt plans for how he would commit suicide.    Past Inpatient Tx:     Patient reports being inpatient several times throughout his life.     Past Suicide Attempts:     1 attempt via OD    Current outpatient:  To be referred     Psychiatrist:     To be referred     Therapist:   To be referred     ACT/ICM/CPS/WRT/SC:     Would like ICM services     Med Hx/Concern:     Chest pain   Knee pain    Hep C   Hep B    Medications:     Suboxone Film 8 mg, Lotrimin 1% Cream, Vibramycin 100 mg, Prozac 20 mg, Seroquel 150 mg    Pharmacy:     [facility]     Spirituality/Synagogue:     n/a     Work/Income:      Education:          Preferred time for appts:   Unemployed      11th grade       Open     Legal:       Probation/[place] Ofc:  No     Access to Firearms:     No      Transportation:     Friends transport or takes the bus     Strength:   Support System:  Cooperative   Family, friends     Goal:  To get sober, and get appropriate meds    Referrals Needed:        Rehab    Transport at Discharge:     To be arranged     IMM:  n/a    ROIs obtained:        Rehab     Insurance:              Emergency Contact:    Perez Tucker (Nephew)    [phone] (M)  No insurance     Audit: 0  PAWSS:    BAT:    UDS:  Positive (Cocaine)    Substance Use:    Freq.  Amount  Last Use  Notes    Methamphetamine   Daily    9/20/24      Crack   Daily     9/20/24

## 2024-09-24 NOTE — PROGRESS NOTES
Patient has been noted to have an irritable affect. Patient has been loud on the unit and observed to be yelling at staff and unwilling to answer questions. Patient has been observed slamming doors and being irritable.       Patient is on a foot soak 3 times a day, and refused evening soak.     Discharge Date: TBD by Treatment Team after continued assessment of patient.    Medications: Suboxone Film 8 mg, Lotrimin 1% Cream, Vibramycin 100 mg, Prozac 20 mg, Seroquel 150 mg       09/24/24 0750   Team Meeting   Meeting Type Daily Rounds   Team Members Present   Team Members Present Physician;Nurse;   Physician Team Member Dr. Holland/ Dr. Kemp/ MEET Rios/ PA Students   Nursing Team Member Aren/ Rishi   Care Management Team Member Garrett/ Tre/ Suri/ Emeli   Patient/Family Present   Patient Present No   Patient's Family Present No

## 2024-09-25 PROBLEM — E43 SEVERE PROTEIN-CALORIE MALNUTRITION (HCC): Status: ACTIVE | Noted: 2024-09-25

## 2024-09-25 PROCEDURE — 99232 SBSQ HOSP IP/OBS MODERATE 35: CPT | Performed by: STUDENT IN AN ORGANIZED HEALTH CARE EDUCATION/TRAINING PROGRAM

## 2024-09-25 RX ORDER — QUETIAPINE FUMARATE 200 MG/1
200 TABLET, FILM COATED ORAL
Status: DISCONTINUED | OUTPATIENT
Start: 2024-09-25 | End: 2024-09-30 | Stop reason: HOSPADM

## 2024-09-25 RX ADMIN — BUPRENORPHINE AND NALOXONE 8 MG: 8; 2 FILM BUCCAL; SUBLINGUAL at 19:00

## 2024-09-25 RX ADMIN — DOXYCYCLINE 100 MG: 100 CAPSULE ORAL at 08:10

## 2024-09-25 RX ADMIN — FLUOXETINE HYDROCHLORIDE 20 MG: 20 CAPSULE ORAL at 08:10

## 2024-09-25 RX ADMIN — BUPRENORPHINE AND NALOXONE 8 MG: 8; 2 FILM BUCCAL; SUBLINGUAL at 06:05

## 2024-09-25 RX ADMIN — DIPHENHYDRAMINE HYDROCHLORIDE AND LIDOCAINE HYDROCHLORIDE AND ALUMINUM HYDROXIDE AND MAGNESIUM HYDRO 10 ML: KIT at 14:34

## 2024-09-25 RX ADMIN — QUETIAPINE 200 MG: 200 TABLET ORAL at 22:21

## 2024-09-25 NOTE — ASSESSMENT & PLAN NOTE
Malnutrition Findings:   Adult Malnutrition type: Chronic illness  Adult Degree of Malnutrition: Other severe protein calorie malnutrition  Malnutrition Characteristics: Inadequate energy, Weight loss                  360 Statement: Pt presents with severe protein calorie malnutriion as evidenced by <75% po intake > 1 month and 13 lb (9%) wt loss in 4 months (5/31/24 141 lbs and 9/24/24 128 lbs). Treat with diet and Ensure High Protein BID.    BMI Findings:           Body mass index is 20.05 kg/m².

## 2024-09-25 NOTE — PLAN OF CARE
Problem: SELF HARM/SUICIDALITY  Goal: Will have no self-injury during hospital stay  Description: INTERVENTIONS:  - Q 15 MINUTES: Routine safety checks  - Q WAKING SHIFT & PRN: Assess risk to determine if routine checks are adequate to maintain patient safety  - Encourage patient to participate actively in care by formulating a plan to combat response to suicidal ideation, identify supports and resources  9/25/2024 0209 by Deepti Jacobs RN  Outcome: Progressing  9/25/2024 0209 by Deepti Jacobs RN  Outcome: Progressing     Problem: DEPRESSION  Goal: Will be euthymic at discharge  Description: INTERVENTIONS:  - Administer medication as ordered  - Provide emotional support via 1:1 interaction with staff  - Encourage involvement in milieu/groups/activities  - Monitor for social isolation  9/25/2024 0209 by Deepti Jacobs RN  Outcome: Progressing  9/25/2024 0209 by Deepti Jacobs RN  Outcome: Progressing     Problem: BEHAVIOR  Goal: Pt/Family maintain appropriate behavior and adhere to behavioral management agreement, if implemented  Description: INTERVENTIONS:  - Assess the family dynamic   - Encourage verbalization of thoughts and concerns in a socially appropriate manner  - Assess patient/family's coping skills and non-compliant behavior (including use of illegal substances).  - Utilize positive, consistent limit setting strategies supporting safety of patient, staff and others  - Initiate consult with Case Management, Spiritual Care or other ancillary services as appropriate  - If a patient's/visitor's behavior jeopardizes the safety of the patient, staff, or others, refer to organization procedure.   - Notify Security of behavior or suspected illegal substances which indicate the need for search of the patient and/or belongings  - Encourage participation in the decision making process about a behavioral management agreement; implement if patient meets criteria  9/25/2024 0209 by Deepti Jacobs RN  Outcome:  Progressing  9/25/2024 0209 by Deepti Jacobs RN  Outcome: Progressing     Problem: ANXIETY  Goal: Will report anxiety at manageable levels  Description: INTERVENTIONS:  - Administer medication as ordered  - Teach and encourage coping skills  - Provide emotional support  - Assess patient/family for anxiety and ability to cope  9/25/2024 0209 by Deepti Jacobs RN  Outcome: Progressing  9/25/2024 0209 by Deepti Jacobs RN  Outcome: Progressing  Goal: By discharge: Patient will verbalize 2 strategies to deal with anxiety  Description: Interventions:  - Identify any obvious source/trigger to anxiety  - Staff will assist patient in applying identified coping technique/skills  - Encourage attendance of scheduled groups and activities  9/25/2024 0209 by Deepti Jacobs RN  Outcome: Progressing  9/25/2024 0209 by Deepti Jacobs RN  Outcome: Progressing     Problem: SLEEP DISTURBANCE  Goal: Will exhibit normal sleeping pattern  Description: Interventions:  -  Assess the patients sleep pattern, noting recent changes  - Administer medication as ordered  - Decrease environmental stimuli, including noise, as appropriate during the night  - Encourage the patient to actively participate in unit groups and or exercise during the day to enhance ability to achieve adequate sleep at night  - Assess the patient, in the morning, encouraging a description of sleep experience  9/25/2024 0209 by Deepti Jacobs RN  Outcome: Progressing  9/25/2024 0209 by Deepti Jacobs RN  Outcome: Progressing     Problem: DISCHARGE PLANNING  Goal: Discharge to home or other facility with appropriate resources  Description: INTERVENTIONS:  - Identify barriers to discharge w/patient and caregiver  - Arrange for needed discharge resources and transportation as appropriate  - Identify discharge learning needs (meds, wound care, etc.)  - Arrange for interpretive services to assist at discharge as needed  - Refer to Case Management Department for coordinating discharge  planning if the patient needs post-hospital services based on physician/advanced practitioner order or complex needs related to functional status, cognitive ability, or social support system  Outcome: Progressing     Problem: Ineffective Coping  Goal: Participates in unit activities  Description: Interventions:  - Provide therapeutic environment   - Provide required programming   - Redirect inappropriate behaviors   9/25/2024 0209 by Deepti aJcobs RN  Outcome: Progressing  9/25/2024 0209 by Deepti Jacobs RN  Outcome: Progressing     Problem: PAIN - ADULT  Goal: Verbalizes/displays adequate comfort level or baseline comfort level  Description: Interventions:  - Encourage patient to monitor pain and request assistance  - Assess pain using appropriate pain scale  - Administer analgesics based on type and severity of pain and evaluate response  - Implement non-pharmacological measures as appropriate and evaluate response  - Consider cultural and social influences on pain and pain management  - Notify physician/advanced practitioner if interventions unsuccessful or patient reports new pain  9/25/2024 0209 by Deepti Jacobs RN  Outcome: Progressing  9/25/2024 0209 by Deepti Jacobs RN  Outcome: Progressing     Problem: INFECTION - ADULT  Goal: Absence or prevention of progression during hospitalization  Description: INTERVENTIONS:  - Assess and monitor for signs and symptoms of infection  - Monitor lab/diagnostic results  - Monitor all insertion sites, i.e. indwelling lines, tubes, and drains  - Monitor endotracheal if appropriate and nasal secretions for changes in amount and color  - [place] appropriate cooling/warming therapies per order  - Administer medications as ordered  - Instruct and encourage patient and family to use good hand hygiene technique  - Identify and instruct in appropriate isolation precautions for identified infection/condition  9/25/2024 0209 by Deepti Jacobs RN  Outcome: Progressing  9/25/2024 0209 by  Deepti Jacobs RN  Outcome: Progressing  Goal: Absence of fever/infection during neutropenic period  Description: INTERVENTIONS:  - Monitor WBC    9/25/2024 0209 by Deepti Jacobs RN  Outcome: Progressing  9/25/2024 0209 by Deepti Jacobs RN  Outcome: Progressing     Problem: SAFETY ADULT  Goal: Patient will remain free of falls  Description: INTERVENTIONS:  - Educate patient/family on patient safety including physical limitations  - Instruct patient to call for assistance with activity, if needed  - Keep bed low  - Keep care items and personal belongings within reach  - Initiate and maintain comfort rounds  - Make Fall Risk Sign visible to staff  - Offer Toileting every 2 Hours, in advance of need  - Apply yellow socks and bracelet for high fall risk patients  - Consider moving patient to room near nurses station  9/25/2024 0209 by Deepti Jacobs RN  Outcome: Progressing  9/25/2024 0209 by Deepti Jaocbs RN  Outcome: Progressing     Problem: Prexisting or High Potential for Compromised Skin Integrity  Goal: Skin integrity is maintained or improved  Description: INTERVENTIONS:  - Identify patients at risk for skin breakdown  - Assess and monitor skin integrity  - Assess and monitor nutrition and hydration status  - Monitor labs   - Assess for incontinence   - Turn and reposition patient  - Assist with mobility/ambulation  - Relieve pressure over bony prominences  - Avoid friction and shearing  - Provide appropriate hygiene as needed including keeping skin clean and dry  - Evaluate need for skin moisturizer/barrier cream  - Collaborate with interdisciplinary team   - Patient/family teaching  - Consider wound care consult   9/25/2024 0209 by Deepti Jacobs RN  Outcome: Progressing  9/25/2024 0209 by Deepti Jacobs RN  Outcome: Progressing     Problem: Nutrition/Hydration-ADULT  Goal: Nutrient/Hydration intake appropriate for improving, restoring or maintaining nutritional needs  Description: Monitor and assess patient's  nutrition/hydration status for malnutrition. Collaborate with interdisciplinary team and initiate plan and interventions as ordered.  Monitor patient's weight and dietary intake as ordered or per policy. Utilize nutrition screening tool and intervene as necessary. Determine patient's food preferences and provide high-protein, high-caloric foods as appropriate.     INTERVENTIONS:  - Monitor oral intake, urinary output, labs, and treatment plans  - Assess nutrition and hydration status and recommend course of action  - Evaluate amount of meals eaten  - Assist patient with eating if necessary   - Allow adequate time for meals  - Recommend/ encourage appropriate diets, oral nutritional supplements, and vitamin/mineral supplements  - Order, calculate, and assess calorie counts as needed  - Recommend, monitor, and adjust tube feedings and TPN/PPN based on assessed needs  - Assess need for intravenous fluids  - Provide specific nutrition/hydration education as appropriate  - Include patient/family/caregiver in decisions related to nutrition  9/25/2024 0209 by Deepti Jacobs RN  Outcome: Progressing  9/25/2024 0209 by Deepti Jacobs RN  Outcome: Progressing

## 2024-09-25 NOTE — NURSING NOTE
"Writer met with patient in patient's room at initial of shift - Patient was observed to be withdrawn to self at time of assessment, in darkened room alone with lights off. Patient denied having auditory or visual hallucinations. Denied suicidal or homicidal ideation. Patient reported feeling safe inside of hospital, however denied that he would feel/be safe outside of the hospital setting. Patient rated 10/10 ongoing generalized body pain, however calm affect did not appear congruent with reported level of pain. Patient  stated that he will accept offer for pain medication later with HS medications. Mood not rated by patient, however patient described mood as \"feeling like shit, feeling like crap\", and he associated his mood with recent history of drug abuse. Affect flat.     2225: Patient earlier stated that he would accept offer for pain medication with HS medications.  PRN ibuprofen (MOTRIN) tablet 800 mg offered for pain earlier reported, however patient declined/refused medication. Patient took scheduled HS Quetiapine due, however refused scheduled dose of doxycycline hyclate (VIBRAMYCIN) capsule 100 mg.      "
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
## 2024-09-25 NOTE — PLAN OF CARE
Patient has attended one therapeutic group during previous two days.    Problem: DEPRESSION  Goal: Will be euthymic at discharge  Description: INTERVENTIONS:  - Administer medication as ordered  - Provide emotional support via 1:1 interaction with staff  - Encourage involvement in milieu/groups/activities  - Monitor for social isolation  Outcome: Progressing     Problem: Ineffective Coping  Goal: Participates in unit activities  Description: Interventions:  - Provide therapeutic environment   - Provide required programming   - Redirect inappropriate behaviors   Outcome: Progressing

## 2024-09-26 ENCOUNTER — APPOINTMENT (INPATIENT)
Dept: RADIOLOGY | Facility: HOSPITAL | Age: 54
DRG: 752 | End: 2024-09-26
Payer: COMMERCIAL

## 2024-09-26 PROCEDURE — 99232 SBSQ HOSP IP/OBS MODERATE 35: CPT | Performed by: STUDENT IN AN ORGANIZED HEALTH CARE EDUCATION/TRAINING PROGRAM

## 2024-09-26 PROCEDURE — 73030 X-RAY EXAM OF SHOULDER: CPT

## 2024-09-26 RX ORDER — FAMOTIDINE 20 MG/1
20 TABLET, FILM COATED ORAL 2 TIMES DAILY
Status: DISCONTINUED | OUTPATIENT
Start: 2024-09-26 | End: 2024-09-30 | Stop reason: HOSPADM

## 2024-09-26 RX ORDER — QUETIAPINE FUMARATE 25 MG/1
50 TABLET, FILM COATED ORAL DAILY
Status: DISCONTINUED | OUTPATIENT
Start: 2024-09-26 | End: 2024-09-27

## 2024-09-26 RX ADMIN — BUPRENORPHINE AND NALOXONE 8 MG: 8; 2 FILM BUCCAL; SUBLINGUAL at 06:28

## 2024-09-26 RX ADMIN — IBUPROFEN 800 MG: 800 TABLET, FILM COATED ORAL at 07:52

## 2024-09-26 RX ADMIN — FAMOTIDINE 20 MG: 20 TABLET, FILM COATED ORAL at 10:09

## 2024-09-26 RX ADMIN — BUPRENORPHINE AND NALOXONE 8 MG: 8; 2 FILM BUCCAL; SUBLINGUAL at 18:06

## 2024-09-26 RX ADMIN — QUETIAPINE 200 MG: 200 TABLET ORAL at 21:41

## 2024-09-26 RX ADMIN — FLUOXETINE HYDROCHLORIDE 20 MG: 20 CAPSULE ORAL at 09:00

## 2024-09-26 RX ADMIN — DOXYCYCLINE 100 MG: 100 CAPSULE ORAL at 21:42

## 2024-09-26 RX ADMIN — QUETIAPINE FUMARATE 50 MG: 25 TABLET ORAL at 14:34

## 2024-09-26 RX ADMIN — CLOTRIMAZOLE: 1 CREAM TOPICAL at 19:53

## 2024-09-26 NOTE — NURSING NOTE
Patient in room, in bed lights out, Patient reports anger towards some people,  anxiety and depression. Patient informed that he has an xray ordered. Patient denies SI HI and AVH. Patient stated that he will do foot/ wound care after his shower. Patient has R shoulder pain declines hot/ cold treatment for pain at this time. Received motrin this AM

## 2024-09-26 NOTE — PLAN OF CARE
Problem: SELF HARM/SUICIDALITY  Goal: Will have no self-injury during hospital stay  Description: INTERVENTIONS:  - Q 15 MINUTES: Routine safety checks  - Q WAKING SHIFT & PRN: Assess risk to determine if routine checks are adequate to maintain patient safety  - Encourage patient to participate actively in care by formulating a plan to combat response to suicidal ideation, identify supports and resources  Outcome: Progressing     Problem: DEPRESSION  Goal: Will be euthymic at discharge  Description: INTERVENTIONS:  - Administer medication as ordered  - Provide emotional support via 1:1 interaction with staff  - Encourage involvement in milieu/groups/activities  - Monitor for social isolation  Outcome: Progressing     Problem: BEHAVIOR  Goal: Pt/Family maintain appropriate behavior and adhere to behavioral management agreement, if implemented  Description: INTERVENTIONS:  - Assess the family dynamic   - Encourage verbalization of thoughts and concerns in a socially appropriate manner  - Assess patient/family's coping skills and non-compliant behavior (including use of illegal substances).  - Utilize positive, consistent limit setting strategies supporting safety of patient, staff and others  - Initiate consult with Case Management, Spiritual Care or other ancillary services as appropriate  - If a patient's/visitor's behavior jeopardizes the safety of the patient, staff, or others, refer to organization procedure.   - Notify Security of behavior or suspected illegal substances which indicate the need for search of the patient and/or belongings  - Encourage participation in the decision making process about a behavioral management agreement; implement if patient meets criteria  Outcome: Progressing     Problem: ANXIETY  Goal: Will report anxiety at manageable levels  Description: INTERVENTIONS:  - Administer medication as ordered  - Teach and encourage coping skills  - Provide emotional support  - Assess  patient/family for anxiety and ability to cope  Outcome: Progressing  Goal: By discharge: Patient will verbalize 2 strategies to deal with anxiety  Description: Interventions:  - Identify any obvious source/trigger to anxiety  - Staff will assist patient in applying identified coping technique/skills  - Encourage attendance of scheduled groups and activities  Outcome: Progressing     Problem: SLEEP DISTURBANCE  Goal: Will exhibit normal sleeping pattern  Description: Interventions:  -  Assess the patients sleep pattern, noting recent changes  - Administer medication as ordered  - Decrease environmental stimuli, including noise, as appropriate during the night  - Encourage the patient to actively participate in unit groups and or exercise during the day to enhance ability to achieve adequate sleep at night  - Assess the patient, in the morning, encouraging a description of sleep experience  Outcome: Progressing     Problem: DISCHARGE PLANNING  Goal: Discharge to home or other facility with appropriate resources  Description: INTERVENTIONS:  - Identify barriers to discharge w/patient and caregiver  - Arrange for needed discharge resources and transportation as appropriate  - Identify discharge learning needs (meds, wound care, etc.)  - Arrange for interpretive services to assist at discharge as needed  - Refer to Case Management Department for coordinating discharge planning if the patient needs post-hospital services based on physician/advanced practitioner order or complex needs related to functional status, cognitive ability, or social support system  Outcome: Progressing     Problem: Ineffective Coping  Goal: Participates in unit activities  Description: Interventions:  - Provide therapeutic environment   - Provide required programming   - Redirect inappropriate behaviors   Outcome: Progressing     Problem: PAIN - ADULT  Goal: Verbalizes/displays adequate comfort level or baseline comfort level  Description:  Interventions:  - Encourage patient to monitor pain and request assistance  - Assess pain using appropriate pain scale  - Administer analgesics based on type and severity of pain and evaluate response  - Implement non-pharmacological measures as appropriate and evaluate response  - Consider cultural and social influences on pain and pain management  - Notify physician/advanced practitioner if interventions unsuccessful or patient reports new pain  Outcome: Not Progressing     Problem: INFECTION - ADULT  Goal: Absence or prevention of progression during hospitalization  Description: INTERVENTIONS:  - Assess and monitor for signs and symptoms of infection  - Monitor lab/diagnostic results  - Monitor all insertion sites, i.e. indwelling lines, tubes, and drains  - Monitor endotracheal if appropriate and nasal secretions for changes in amount and color  - [place] appropriate cooling/warming therapies per order  - Administer medications as ordered  - Instruct and encourage patient and family to use good hand hygiene technique  - Identify and instruct in appropriate isolation precautions for identified infection/condition  Outcome: Progressing  Goal: Absence of fever/infection during neutropenic period  Description: INTERVENTIONS:  - Monitor WBC    Outcome: Progressing     Problem: PAIN - ADULT  Goal: Verbalizes/displays adequate comfort level or baseline comfort level  Description: Interventions:  - Encourage patient to monitor pain and request assistance  - Assess pain using appropriate pain scale  - Administer analgesics based on type and severity of pain and evaluate response  - Implement non-pharmacological measures as appropriate and evaluate response  - Consider cultural and social influences on pain and pain management  - Notify physician/advanced practitioner if interventions unsuccessful or patient reports new pain  Outcome: Not Progressing     Problem: INFECTION - ADULT  Goal: Absence or prevention of  progression during hospitalization  Description: INTERVENTIONS:  - Assess and monitor for signs and symptoms of infection  - Monitor lab/diagnostic results  - Monitor all insertion sites, i.e. indwelling lines, tubes, and drains  - Monitor endotracheal if appropriate and nasal secretions for changes in amount and color  - [place] appropriate cooling/warming therapies per order  - Administer medications as ordered  - Instruct and encourage patient and family to use good hand hygiene technique  - Identify and instruct in appropriate isolation precautions for identified infection/condition  Outcome: Progressing  Goal: Absence of fever/infection during neutropenic period  Description: INTERVENTIONS:  - Monitor WBC    Outcome: Progressing     Problem: SAFETY ADULT  Goal: Patient will remain free of falls  Description: INTERVENTIONS:  - Educate patient/family on patient safety including physical limitations  - Instruct patient to call for assistance with activity   - Consult OT/PT to assist with strengthening/mobility   - Keep Call bell within reach  - Keep bed low and locked with side rails adjusted as appropriate  - Keep care items and personal belongings within reach  - Initiate and maintain comfort rounds  - Make Fall Risk Sign visible to staff  - Offer Toileting every  Hours, in advance of need  - Initiate/Maintain alarm  - Obtain necessary fall risk management equipment:   Problem: Prexisting or High Potential for Compromised Skin Integrity  Goal: Skin integrity is maintained or improved  Description: INTERVENTIONS:  - Identify patients at risk for skin breakdown  - Assess and monitor skin integrity  - Assess and monitor nutrition and hydration status  - Monitor labs   - Assess for incontinence   - Turn and reposition patient  - Assist with mobility/ambulation  - Relieve pressure over bony prominences  - Avoid friction and shearing  - Provide appropriate hygiene as needed including keeping skin clean and dry  -  Evaluate need for skin moisturizer/barrier cream  - Collaborate with interdisciplinary team   - Patient/family teaching  - Consider wound care consult   Outcome: Progressing     Problem: Nutrition/Hydration-ADULT  Goal: Nutrient/Hydration intake appropriate for improving, restoring or maintaining nutritional needs  Description: Monitor and assess patient's nutrition/hydration status for malnutrition. Collaborate with interdisciplinary team and initiate plan and interventions as ordered.  Monitor patient's weight and dietary intake as ordered or per policy. Utilize nutrition screening tool and intervene as necessary. Determine patient's food preferences and provide high-protein, high-caloric foods as appropriate.     INTERVENTIONS:  - Monitor oral intake, urinary output, labs, and treatment plans  - Assess nutrition and hydration status and recommend course of action  - Evaluate amount of meals eaten  - Assist patient with eating if necessary   - Allow adequate time for meals  - Recommend/ encourage appropriate diets, oral nutritional supplements, and vitamin/mineral supplements  - Order, calculate, and assess calorie counts as needed  - Recommend, monitor, and adjust tube feedings and TPN/PPN based on assessed needs  - Assess need for intravenous fluids  - Provide specific nutrition/hydration education as appropriate  - Include patient/family/caregiver in decisions related to nutrition  Outcome: Progressing     - Apply yellow socks and bracelet for high fall risk patients  - Consider moving patient to room near nurses station  Outcome: Progressing

## 2024-09-26 NOTE — NURSING NOTE
"Pt ambulating in hallway on approach. Calm and cooperative during assessment, however, scant in conversation. Continues to appear mildly anxious and depressed during conversation. Continues to deny SI/HI/AVH. Pt endorses pain; Offered medication by this writer but patient refused. Pt states \"I just had my Seroquel.\" Plan of care ongoing. Pt denies any other needs or concerns at this time.  "

## 2024-09-26 NOTE — NURSING NOTE
Patient is requested compression stocking for reported left knee arthritis. One was provided on previous shift. On-call provider contacted for approval before it was given to patient.

## 2024-09-26 NOTE — PROGRESS NOTES
09/26/24 0830   Team Meeting   Meeting Type Daily Rounds   Team Members Present   Team Members Present Physician;Nurse;   Physician Team Member Dr. Kemp / MEET Wilson / MEET Rios / PA Fidencio   Nursing Team Member Jewel / Nursing Students   Care Management Team Member Suri / Emeli / Tre   Patient/Family Present   Patient Present No   Patient's Family Present No       Treatment Team Rounds Completed  Medical and Psychiatric Review Completed  D/C: Pt is reported to be cooperative but has an irritable edge. Pt is tentatively scheduled to discharge on Wednesday 10/2/2024.

## 2024-09-26 NOTE — ASSESSMENT & PLAN NOTE
Continue Prozac 20 mg daily, increase to 40 mg tomorrow  Seroquel to 200 mg mg at bedtime, will start 50 mg daily  Consult nutrition

## 2024-09-26 NOTE — PROGRESS NOTES
Progress Note - Behavioral Health   Name: Perez Stack Jr. 53 y.o. male I MRN: [id number]  Unit/Bed#: -01 I Date of Admission: 9/21/2024   Date of Service: 9/26/2024 I Hospital Day: 5     Assessment & Plan  Bipolar disorder (HCC)  Continue Prozac 20 mg daily, increase to 40 mg tomorrow  Seroquel to 200 mg mg at bedtime, will start 50 mg daily  Consult nutrition  Hepatitis C virus infection    Gastroesophageal reflux disease    Chronic hepatitis B virus infection (HCC)    Medical clearance for psychiatric admission    Severe stimulant use disorder (HCC)    Opioid use disorder in remission    Elevated LFTs    Toe pain    Low TSH level    Severe protein-calorie malnutrition (HCC)  Malnutrition Findings:   Adult Malnutrition type: Chronic illness  Adult Degree of Malnutrition: Other severe protein calorie malnutrition  Malnutrition Characteristics: Inadequate energy, Weight loss                  360 Statement: Pt presents with severe protein calorie malnutriion as evidenced by <75% po intake > 1 month and 13 lb (9%) wt loss in 4 months (5/31/24 141 lbs and 9/24/24 128 lbs). Treat with diet and Ensure High Protein BID.    BMI Findings:           Body mass index is 20.05 kg/m².       Recommended Treatment: Continue with pharmacotherapy, group therapy, milieu therapy and occupational therapy.   Risks, benefits and possible side effects of Medications:   Risks, benefits, alternatives, and possible side effects of patient's psychiatric medications were discussed with patient.    Progress Toward Goals: Progressing. Patient is not at goal. They are not yet ready for discharge. The patient's condition currently requires active psychopharmacological medication management, interdisciplinary coordination with case management, and the utilization of adjunctive milieu and group therapy to augment psychopharmacological efficacy. The patient's risk of morbidity, and progression or decompensation of psychiatric disease, is higher  "without this current treatment.    Subjective: Patient was seen, chart was reviewed, and case was discussed with the team.  Patient appears withdrawn, seclusive, irritable, anxious and depressed.  Patient continues to endorse depressed mood, irritability, racing thoughts, anxiety and fleeting suicidal ideations.  Denies any intent or plan.  He also endorses hatred towards others.  Denies any intent to hurt anybody \" I am trying not to act out.  I am trying to stay out of my head\". Patient is compliant with medications. Patient denied adverse effects to their current psychiatric medication regimen. Discussed the importance of continuing to take medications as prescribed, as well as the importance of continuing to attend groups on the unit.    Behavior over the last 24 hours:  unchanged  Sleep: normal  Appetite: normal  Medication side effects: No    Medical ROS: Pertinent items are noted in HPI.all other systems are negative      Scheduled medications:  Current Facility-Administered Medications   Medication Dose Route Frequency Provider Last Rate    aluminum-magnesium hydroxide-simethicone  30 mL Oral Q4H PRN Shan Kemp MD      benztropine  1 mg Oral Q4H PRN Max 6/day Shan Kemp MD      buprenorphine-naloxone  8 mg Sublingual BID Shan Kemp MD      clotrimazole   Topical BID Maile Munoz PA-C      Diclofenac Sodium  4 g Topical 4x Daily Toni Mcclendon PA-C      hydrOXYzine HCL  50 mg Oral Q6H PRN Max 4/day Shan Kemp MD      Or    diphenhydrAMINE  50 mg Intramuscular Q6H PRN Shan Kemp MD      diphenhydramine, lidocaine, Al/Mg hydroxide, simethicone  10 mL Swish & Spit Q4H PRN Raquel Perez PA-C      doxycycline hyclate  100 mg Oral Q12H ISAI Maile Munoz PA-C      famotidine  20 mg Oral BID Toni Mcclendon PA-C      [START ON 9/27/2024] FLUoxetine  40 mg Oral Daily Shan Astorga " MD Justo      hydrOXYzine HCL  100 mg Oral Q6H PRN Max 4/day [facility] Kemp [facility]      Or    LORazepam  2 mg Intramuscular Q6H PRN Clay Gauri Kemp MD      hydrOXYzine HCL  25 mg Oral Q6H PRN Max 4/day [facility]      ibuprofen  400 mg Oral Q4H PRN [facility]      ibuprofen  600 mg Oral Q6H PRN [facility] Just[facility]      ibuprofen  800 mg Oral Q8H PRN [facility]      OLANZapine  10 mg Oral Q3H PRN Max 3/day [facility] Kemp MD      Or    OLANZapine  10 mg Intramuscular Q3H PRN Max 3/day [facility]      OLANZapine  5 mg Oral Q3H PRN Max 6/day [facility] Kemp MD      Or    OLANZapine  5 mg Intramuscular Q3H PRN Max 6/day [facility] MD Justo      OLANZapine  2.5 mg Oral Q3H PRN Max 8/day [facility]      polyethylene glycol  17 g Oral Daily PRN Shan Kemp MD      QUEtiapine  200 mg Oral HS Clay Gauri Kemp MD      QUEtiapine  50 mg Oral Daily Clay Gauri Kemp MD      senna-docusate sodium  1 tablet Oral Daily PRN Clay Gauri Kemp MD      traZODone  50 mg Oral HS PRN [facility] K[facility] MD        PRN:    aluminum-magnesium hydroxide-simethicone    benztropine    hydrOXYzine HCL **OR** diphenhydrAMINE    diphenhydramine, lidocaine, Al/Mg hydroxide, simethicone    hydrOXYzine HCL **OR** LORazepam    hydrOXYzine HCL    ibuprofen    ibuprofen    ibuprofen    OLANZapine **OR** OLANZapine    OLANZapine **OR** OLANZapine    OLANZapine    polyethylene glycol    senna-docusate sodium    traZODone    - Observation: routine            - VS: as per unit protocol  - Legal status: 201  - Diet: Regular diet  - Encourage group attendance and milieu therapy  - Dispo: To be determined        Objective    Current Mental Status Evaluation:  Appearance  and attitude: appeared as stated age, dressed in hospital attire, with good hygiene  Eye contact: fair  Motor Function: within normal limits, intact gait, No PMA/PMR  Gait/station: normal gait/station  Speech: normal for rate, rhythm, volume, latency, amount  Language: No overt abnormality  Mood/affect: depressed, anxious, irritable, angry / Affect was constricted but reactive, mood congruent, blunted, labile  Thought Processes: sequential and goal-directed, logical  Thought content: denies suicidal ideation or homicidal ideation; no delusions or first rank symptoms  Associations: circumstantial associations  Perceptual disturbances: denies Auditory/Visual/Tactile Hallucinations  Orientation: oriented to time, person, place and to the situational context  Cognitive Function: intact  Memory: recent and remote memory grossly intact  Intellect: average  Fund of knowledge: aware of current events, aware of past history, and vocabulary average  Impulse control: poor  Insight/judgment: fair/fair      Vital signs in last 24 hours:    Temp:  [97.8 °F (36.6 °C)] 97.8 °F (36.6 °C)  HR:  [81-86] 81  Resp:  [18] 18  BP: (123-126)/(69-84) 123/84    Lab Results: I have reviewed the following results:   Most Recent Labs:   Lab Results   Component Value Date    WBC 3.30 (L) 09/22/2024    RBC 3.96 09/22/2024    HGB 12.6 09/22/2024    HCT 36.2 (L) 09/22/2024     09/22/2024    RDW 15.3 (H) 09/22/2024    NEUTROABS 1.79 (L) 09/22/2024    SODIUM 146 09/22/2024    K 4.1 09/22/2024     (H) 09/22/2024    CO2 31 09/22/2024    BUN 16 09/22/2024    CREATININE 0.72 09/22/2024    GLUC 89 09/22/2024    GLUF 87 05/03/2023    CALCIUM 9.2 09/22/2024    AST 35 09/22/2024    ALT 38 09/22/2024    ALKPHOS 55 09/22/2024    TP 5.9 (L) 09/22/2024    ALB 3.7 09/22/2024    TBILI 1.12 (H) 09/22/2024    CHOLESTEROL 100 09/22/2024    HDL 42 09/22/2024    TRIG 51 09/22/2024    LDLCALC 48 09/22/2024    NONHDLC 58 09/22/2024    AMMONIA 38 (H)  04/06/2014    VHH1VUZRSWFG 0.462 09/22/2024    FREET4 0.82 10/20/2015    T3FREE 2.22 (L) 10/20/2015    RPR Non-Reactive 03/17/2016    HGBA1C 4.2 09/22/2024    EAG 74 09/22/2024       Counseling / Coordination of Care  Patient's progress discussed with staff in treatment team meeting.  Medications, treatment progress and treatment plan reviewed with patient.  Medication education provided to patient.  Supportive therapy provided to patient.  Cognitive techniques utilized during the session.  Reassurance and supportive therapy provided.  Encouraged participation in milieu and group therapy on the unit.  Crisis/safety plan discussed with patient.        This note was completed in part utilizing Dragon dictation Software. Grammatical, translation, syntax errors, random word insertions, spelling mistakes, and incomplete sentences may be an occasional consequence of this system secondary to software limitations with voice recognition, ambient noise, and hardware issues. If you have any questions or concerns about the content, text, or information contained within the body of this dictation, please contact the provider for clarification.

## 2024-09-27 LAB
ATRIAL RATE: 71 BPM
ATRIAL RATE: 79 BPM
P AXIS: 71 DEGREES
P AXIS: 73 DEGREES
PR INTERVAL: 146 MS
PR INTERVAL: 156 MS
QRS AXIS: 35 DEGREES
QRS AXIS: 35 DEGREES
QRSD INTERVAL: 86 MS
QRSD INTERVAL: 88 MS
QT INTERVAL: 374 MS
QT INTERVAL: 394 MS
QTC INTERVAL: 428 MS
QTC INTERVAL: 428 MS
T WAVE AXIS: 45 DEGREES
T WAVE AXIS: 52 DEGREES
VENTRICULAR RATE: 71 BPM
VENTRICULAR RATE: 79 BPM

## 2024-09-27 PROCEDURE — 93010 ELECTROCARDIOGRAM REPORT: CPT | Performed by: INTERNAL MEDICINE

## 2024-09-27 PROCEDURE — 99232 SBSQ HOSP IP/OBS MODERATE 35: CPT | Performed by: STUDENT IN AN ORGANIZED HEALTH CARE EDUCATION/TRAINING PROGRAM

## 2024-09-27 RX ORDER — QUETIAPINE FUMARATE 25 MG/1
50 TABLET, FILM COATED ORAL 2 TIMES DAILY
Status: DISCONTINUED | OUTPATIENT
Start: 2024-09-27 | End: 2024-09-30 | Stop reason: HOSPADM

## 2024-09-27 RX ADMIN — QUETIAPINE FUMARATE 50 MG: 25 TABLET ORAL at 15:46

## 2024-09-27 RX ADMIN — FAMOTIDINE 20 MG: 20 TABLET, FILM COATED ORAL at 09:37

## 2024-09-27 RX ADMIN — DOXYCYCLINE 100 MG: 100 CAPSULE ORAL at 22:34

## 2024-09-27 RX ADMIN — DIPHENHYDRAMINE HYDROCHLORIDE AND LIDOCAINE HYDROCHLORIDE AND ALUMINUM HYDROXIDE AND MAGNESIUM HYDRO 10 ML: KIT at 13:30

## 2024-09-27 RX ADMIN — QUETIAPINE 200 MG: 200 TABLET ORAL at 22:33

## 2024-09-27 RX ADMIN — BUPRENORPHINE AND NALOXONE 8 MG: 8; 2 FILM BUCCAL; SUBLINGUAL at 06:29

## 2024-09-27 RX ADMIN — FLUOXETINE HYDROCHLORIDE 40 MG: 20 CAPSULE ORAL at 09:37

## 2024-09-27 RX ADMIN — QUETIAPINE FUMARATE 50 MG: 25 TABLET ORAL at 09:37

## 2024-09-27 RX ADMIN — DOXYCYCLINE 100 MG: 100 CAPSULE ORAL at 09:37

## 2024-09-27 RX ADMIN — BUPRENORPHINE AND NALOXONE 8 MG: 8; 2 FILM BUCCAL; SUBLINGUAL at 18:19

## 2024-09-27 NOTE — NURSING NOTE
Pt very entitled, pleasant and cooperative, when needs are met  immediately, denies SI , AVH, will continue to monitor physical symptoms as well as emotional issues

## 2024-09-27 NOTE — ASSESSMENT & PLAN NOTE
Continue Prozac 40 mg daily,  Seroquel to 200 mg mg at bedtime, will start 50 mg BID  Consult nutrition

## 2024-09-27 NOTE — PLAN OF CARE
Problem: SELF HARM/SUICIDALITY  Goal: Will have no self-injury during hospital stay  Description: INTERVENTIONS:  - Q 15 MINUTES: Routine safety checks  - Q WAKING SHIFT & PRN: Assess risk to determine if routine checks are adequate to maintain patient safety  - Encourage patient to participate actively in care by formulating a plan to combat response to suicidal ideation, identify supports and resources  9/27/2024 0220 by Deepti Jacobs RN  Outcome: Progressing  9/27/2024 0220 by Deepti Jacobs RN  Outcome: Progressing     Problem: DEPRESSION  Goal: Will be euthymic at discharge  Description: INTERVENTIONS:  - Administer medication as ordered  - Provide emotional support via 1:1 interaction with staff  - Encourage involvement in milieu/groups/activities  - Monitor for social isolation  9/27/2024 0220 by Deepti Jacobs RN  Outcome: Progressing  9/27/2024 0220 by Deepti Jacobs RN  Outcome: Progressing     Problem: BEHAVIOR  Goal: Pt/Family maintain appropriate behavior and adhere to behavioral management agreement, if implemented  Description: INTERVENTIONS:  - Assess the family dynamic   - Encourage verbalization of thoughts and concerns in a socially appropriate manner  - Assess patient/family's coping skills and non-compliant behavior (including use of illegal substances).  - Utilize positive, consistent limit setting strategies supporting safety of patient, staff and others  - Initiate consult with Case Management, Spiritual Care or other ancillary services as appropriate  - If a patient's/visitor's behavior jeopardizes the safety of the patient, staff, or others, refer to organization procedure.   - Notify Security of behavior or suspected illegal substances which indicate the need for search of the patient and/or belongings  - Encourage participation in the decision making process about a behavioral management agreement; implement if patient meets criteria  9/27/2024 0220 by Deepti Jacobs RN  Outcome:  Progressing  9/27/2024 0220 by Deepti Jacobs RN  Outcome: Progressing     Problem: ANXIETY  Goal: Will report anxiety at manageable levels  Description: INTERVENTIONS:  - Administer medication as ordered  - Teach and encourage coping skills  - Provide emotional support  - Assess patient/family for anxiety and ability to cope  9/27/2024 0220 by Deepti Jacobs RN  Outcome: Progressing  9/27/2024 0220 by Deepti Jacobs RN  Outcome: Progressing  Goal: By discharge: Patient will verbalize 2 strategies to deal with anxiety  Description: Interventions:  - Identify any obvious source/trigger to anxiety  - Staff will assist patient in applying identified coping technique/skills  - Encourage attendance of scheduled groups and activities  9/27/2024 0220 by Deepti Jacobs RN  Outcome: Progressing  9/27/2024 0220 by Deepti Jacobs RN  Outcome: Progressing     Problem: SLEEP DISTURBANCE  Goal: Will exhibit normal sleeping pattern  Description: Interventions:  -  Assess the patients sleep pattern, noting recent changes  - Administer medication as ordered  - Decrease environmental stimuli, including noise, as appropriate during the night  - Encourage the patient to actively participate in unit groups and or exercise during the day to enhance ability to achieve adequate sleep at night  - Assess the patient, in the morning, encouraging a description of sleep experience  9/27/2024 0220 by Deepti Jacobs RN  Outcome: Progressing  9/27/2024 0220 by Deepti Jacobs RN  Outcome: Progressing     Problem: DISCHARGE PLANNING  Goal: Discharge to home or other facility with appropriate resources  Description: INTERVENTIONS:  - Identify barriers to discharge w/patient and caregiver  - Arrange for needed discharge resources and transportation as appropriate  - Identify discharge learning needs (meds, wound care, etc.)  - Arrange for interpretive services to assist at discharge as needed  - Refer to Case Management Department for coordinating discharge  planning if the patient needs post-hospital services based on physician/advanced practitioner order or complex needs related to functional status, cognitive ability, or social support system  Outcome: Progressing     Problem: Ineffective Coping  Goal: Participates in unit activities  Description: Interventions:  - Provide therapeutic environment   - Provide required programming   - Redirect inappropriate behaviors   9/27/2024 0220 by Deepti Jacobs RN  Outcome: Progressing  9/27/2024 0220 by Deepti Jacobs RN  Outcome: Progressing     Problem: PAIN - ADULT  Goal: Verbalizes/displays adequate comfort level or baseline comfort level  Description: Interventions:  - Encourage patient to monitor pain and request assistance  - Assess pain using appropriate pain scale  - Administer analgesics based on type and severity of pain and evaluate response  - Implement non-pharmacological measures as appropriate and evaluate response  - Consider cultural and social influences on pain and pain management  - Notify physician/advanced practitioner if interventions unsuccessful or patient reports new pain  9/27/2024 0220 by Deepti Jacobs RN  Outcome: Progressing  9/27/2024 0220 by Deepti Jacobs RN  Outcome: Progressing     Problem: INFECTION - ADULT  Goal: Absence or prevention of progression during hospitalization  Description: INTERVENTIONS:  - Assess and monitor for signs and symptoms of infection  - Monitor lab/diagnostic results  - Monitor all insertion sites, i.e. indwelling lines, tubes, and drains  - Monitor endotracheal if appropriate and nasal secretions for changes in amount and color  - [place] appropriate cooling/warming therapies per order  - Administer medications as ordered  - Instruct and encourage patient and family to use good hand hygiene technique  - Identify and instruct in appropriate isolation precautions for identified infection/condition  9/27/2024 0220 by Deepti Jacobs RN  Outcome: Progressing  9/27/2024 0220 by  Deepti Jacobs RN  Outcome: Progressing  Goal: Absence of fever/infection during neutropenic period  Description: INTERVENTIONS:  - Monitor WBC    9/27/2024 0220 by Deepti Jacobs RN  Outcome: Progressing  9/27/2024 0220 by Deepti Jacobs RN  Outcome: Progressing     Problem: SAFETY ADULT  Goal: Patient will remain free of falls  Description: INTERVENTIONS:  - Educate patient/family on patient safety including physical limitations  - Instruct patient to call for assistance with activity   - Keep bed low   - Keep care items and personal belongings within reach  - Initiate and maintain comfort rounds  - Make Fall Risk Sign visible to staff  - Apply yellow socks and bracelet for high fall risk patients  - Consider moving patient to room near nurses station  9/27/2024 0220 by Deepti Jacobs RN  Outcome: Progressing  9/27/2024 0220 by Deepti Jacobs RN  Outcome: Progressing     Problem: Prexisting or High Potential for Compromised Skin Integrity  Goal: Skin integrity is maintained or improved  Description: INTERVENTIONS:  - Identify patients at risk for skin breakdown  - Assess and monitor skin integrity  - Assess and monitor nutrition and hydration status  - Monitor labs   - Assess for incontinence   - Turn and reposition patient  - Assist with mobility/ambulation  - Relieve pressure over bony prominences  - Avoid friction and shearing  - Provide appropriate hygiene as needed including keeping skin clean and dry  - Evaluate need for skin moisturizer/barrier cream  - Collaborate with interdisciplinary team   - Patient/family teaching  - Consider wound care consult   9/27/2024 0220 by Deepti Jacobs RN  Outcome: Progressing  9/27/2024 0220 by Deepti Jacobs RN  Outcome: Progressing     Problem: Nutrition/Hydration-ADULT  Goal: Nutrient/Hydration intake appropriate for improving, restoring or maintaining nutritional needs  Description: Monitor and assess patient's nutrition/hydration status for malnutrition. Collaborate with  interdisciplinary team and initiate plan and interventions as ordered.  Monitor patient's weight and dietary intake as ordered or per policy. Utilize nutrition screening tool and intervene as necessary. Determine patient's food preferences and provide high-protein, high-caloric foods as appropriate.     INTERVENTIONS:  - Monitor oral intake, urinary output, labs, and treatment plans  - Assess nutrition and hydration status and recommend course of action  - Evaluate amount of meals eaten  - Assist patient with eating if necessary   - Allow adequate time for meals  - Recommend/ encourage appropriate diets, oral nutritional supplements, and vitamin/mineral supplements  - Order, calculate, and assess calorie counts as needed  - Recommend, monitor, and adjust tube feedings and TPN/PPN based on assessed needs  - Assess need for intravenous fluids  - Provide specific nutrition/hydration education as appropriate  - Include patient/family/caregiver in decisions related to nutrition  9/27/2024 0220 by Deepti Jacobs RN  Outcome: Progressing  9/27/2024 0220 by Deepti Jacobs RN  Outcome: Progressing

## 2024-09-27 NOTE — PLAN OF CARE
Patient attends select groups and other unit activities.     Problem: DEPRESSION  Goal: Will be euthymic at discharge  Description: INTERVENTIONS:  - Administer medication as ordered  - Provide emotional support via 1:1 interaction with staff  - Encourage involvement in milieu/groups/activities  - Monitor for social isolation  Outcome: Progressing     Problem: Ineffective Coping  Goal: Participates in unit activities  Description: Interventions:  - Provide therapeutic environment   - Provide required programming   - Redirect inappropriate behaviors   Outcome: Progressing

## 2024-09-27 NOTE — NURSING NOTE
"Pt reports missing black shirt from laundry room. This writer was able to find clothing. Pt reports doing \"okay\". Reports having racing/fleeting SI, no plan or intent. Denies HI or hallucination. States \"I'm just trying to stay out of my bed. The thoughts get worse when I lay down\". Pt complaint with medication. Speaking with provider about ordering a \"sleeve\".   "

## 2024-09-27 NOTE — PROGRESS NOTES
Progress Note - Behavioral Health   Name: Perez Stack Jr. 53 y.o. male I MRN: [id number]  Unit/Bed#: -01 I Date of Admission: 9/21/2024   Date of Service: 9/27/2024 I Hospital Day: 6     Assessment & Plan  Bipolar disorder (HCC)  Continue Prozac 40 mg daily,  Seroquel to 200 mg mg at bedtime, will start 50 mg BID  Consult nutrition  Hepatitis C virus infection    Gastroesophageal reflux disease    Chronic hepatitis B virus infection (HCC)    Medical clearance for psychiatric admission    Severe stimulant use disorder (HCC)    Opioid use disorder in remission    Elevated LFTs    Toe pain    Low TSH level    Severe protein-calorie malnutrition (HCC)  Malnutrition Findings:   Adult Malnutrition type: Chronic illness  Adult Degree of Malnutrition: Other severe protein calorie malnutrition  Malnutrition Characteristics: Inadequate energy, Weight loss                  360 Statement: Pt presents with severe protein calorie malnutriion as evidenced by <75% po intake > 1 month and 13 lb (9%) wt loss in 4 months (5/31/24 141 lbs and 9/24/24 128 lbs). Treat with diet and Ensure High Protein BID.    BMI Findings:           Body mass index is 20.05 kg/m².       Recommended Treatment: Continue with pharmacotherapy, group therapy, milieu therapy and occupational therapy.   Risks, benefits and possible side effects of Medications:   Risks, benefits, alternatives, and possible side effects of patient's psychiatric medications were discussed with patient.    Progress Toward Goals: Progressing. Patient is not at goal. They are not yet ready for discharge. The patient's condition currently requires active psychopharmacological medication management, interdisciplinary coordination with case management, and the utilization of adjunctive milieu and group therapy to augment psychopharmacological efficacy. The patient's risk of morbidity, and progression or decompensation of psychiatric disease, is higher without this current  treatment.    Subjective: Patient was seen, chart was reviewed, and case was discussed with the team.  Patient appears irritable, loud, labile and anxious.  Patient continues to endorse depressed mood, irritable, mood swings, and fleeting suicidal ideations.  Denies any intent or plan.  He feels safe in the hospital.  He denies any hallucinations.  Sleep has improved. Patient is compliant with medications. Patient denied adverse effects to their current psychiatric medication regimen. Discussed the importance of continuing to take medications as prescribed, as well as the importance of continuing to attend groups on the unit.    Behavior over the last 24 hours:  improved  Sleep: normal  Appetite: normal  Medication side effects: No    Medical ROS: Pertinent items are noted in HPI.all other systems are negative      Scheduled medications:  Current Facility-Administered Medications   Medication Dose Route Frequency Provider Last Rate    aluminum-magnesium hydroxide-simethicone  30 mL Oral Q4H PRN Shan Kemp MD      benztropine  1 mg Oral Q4H PRN Max 6/day Shan Kemp MD      buprenorphine-naloxone  8 mg Sublingual BID Shan Kemp MD      clotrimazole   Topical BID Maile Munoz PA-C      Diclofenac Sodium  4 g Topical 4x Daily Toni Mcclendon PA-C      hydrOXYzine HCL  50 mg Oral Q6H PRN Max 4/day Shan Kemp MD      Or    diphenhydrAMINE  50 mg Intramuscular Q6H PRN Shan Kemp MD      diphenhydramine, lidocaine, Al/Mg hydroxide, simethicone  10 mL Swish & Spit Q4H PRN Raquel Perez PA-C      doxycycline hyclate  100 mg Oral Q12H [facility] Maile Munoz PA-C      famotidine  20 mg Oral BID Toni Mcclendon PA-C      FLUoxetine  40 mg Oral Daily Shan Kemp MD      hydrOXYzine HCL  100 mg Oral Q6H PRN Max 4/day Shan Kemp MD      Or    LORazepam  2 mg  Intramuscular Q6H PRN Shan Kemp MD      hydrOXYzine HCL  25 mg Oral Q6H PRN Max 4/day Clay Gauri Kemp MD      ibuprofen  400 mg Oral Q4H PRN Shan Kemp MD      ibuprofen  600 mg Oral Q6H PRN Shan Kemp MD      ibuprofen  800 mg Oral Q8H PRN Clay Guari Kemp MD      OLANZapine  10 mg Oral Q3H PRN Max 3/[facility]      Or    OLANZapine  10 mg Intramuscular Q3H PRN Max 3/day Clay Gauri Kemp MD      OLANZapine  5 mg Oral Q3H PRN Max 6/[facility]      Or    OLANZapine  5 mg Intramuscular Q3H PRN Max 6/[facility]      OLANZapine  2.5 mg Oral Q3H PRN Max 8/[facility]      polyethylene glycol  17 g Oral Daily PRN Shan Kemp MD      QUEtiapine  200 mg Oral HS Clay Gauri Kemp MD      QUEtiapine  50 mg Oral Daily lCay Gauri Kemp MD      senna-docusate sodium  1 tablet Oral Daily PRN Shan Kemp MD      traZODone  50 mg Oral HS PRN Shan Kemp MD        PRN:    aluminum-magnesium hydroxide-simethicone    benztropine    hydrOXYzine HCL **OR** diphenhydrAMINE    diphenhydramine, lidocaine, Al/Mg hydroxide, simethicone    hydrOXYzine HCL **OR** LORazepam    hydrOXYzine HCL    ibuprofen    ibuprofen    ibuprofen    OLANZapine **OR** OLANZapine    OLANZapine **OR** OLANZapine    OLANZapine    polyethylene glycol    senna-docusate sodium    traZODone    - Observation: routine            - VS: as per unit protocol  - Legal status: 201  - Diet: Regular diet  - Encourage group attendance and milieu therapy  - Dispo: To be determined        Objective    Current Mental Status Evaluation:  Appearance and attitude: appeared as stated age, dressed in hospital attire, with good hygiene  Eye contact: fair  Motor Function: within  normal limits, intact gait, No PMA/PMR  Gait/station: normal gait/station  Speech: normal for rate, rhythm, volume, latency, amount  Language: No overt abnormality  Mood/affect: dysphoric, angry, less irritable / Affect was constricted but reactive, mood congruent, labile  Thought Processes: sequential and goal-directed, logical, coherent, organized  Thought content: denies suicidal ideation or homicidal ideation; no delusions or first rank symptoms  Associations: intact associations  Perceptual disturbances: denies Auditory/Visual/Tactile Hallucinations  Orientation: oriented to time, person, place and to the situational context  Cognitive Function: intact  Memory: recent and remote memory grossly intact  Intellect: average  Fund of knowledge: aware of current events, aware of past history, and vocabulary average  Impulse control: fair  Insight/judgment: fair/fair      Vital signs in last 24 hours:    Temp:  [97 °F (36.1 °C)-97.1 °F (36.2 °C)] 97 °F (36.1 °C)  HR:  [83-90] 83  Resp:  [16-17] 16  BP: (125-127)/(73-83) 127/73    Lab Results: I have reviewed the following results:   Most Recent Labs:   Lab Results   Component Value Date    WBC 3.30 (L) 09/22/2024    RBC 3.96 09/22/2024    HGB 12.6 09/22/2024    HCT 36.2 (L) 09/22/2024     09/22/2024    RDW 15.3 (H) 09/22/2024    NEUTROABS 1.79 (L) 09/22/2024    SODIUM 146 09/22/2024    K 4.1 09/22/2024     (H) 09/22/2024    CO2 31 09/22/2024    BUN 16 09/22/2024    CREATININE 0.72 09/22/2024    GLUC 89 09/22/2024    GLUF 87 05/03/2023    CALCIUM 9.2 09/22/2024    AST 35 09/22/2024    ALT 38 09/22/2024    ALKPHOS 55 09/22/2024    TP 5.9 (L) 09/22/2024    ALB 3.7 09/22/2024    TBILI 1.12 (H) 09/22/2024    CHOLESTEROL 100 09/22/2024    HDL 42 09/22/2024    TRIG 51 09/22/2024    LDLCALC 48 09/22/2024    NONHDLC 58 09/22/2024    AMMONIA 38 (H) 04/06/2014    FXB7EIEYWCAE 0.462 09/22/2024    FREET4 0.82 10/20/2015    T3FREE 2.22 (L) 10/20/2015    RPR  Non-Reactive 03/17/2016    HGBA1C 4.2 09/22/2024    EAG 74 09/22/2024       Counseling / Coordination of Care  Patient's progress discussed with staff in treatment team meeting.  Medications, treatment progress and treatment plan reviewed with patient.  Medication education provided to patient.  Supportive therapy provided to patient.  Cognitive techniques utilized during the session.  Reassurance and supportive therapy provided.  Encouraged participation in milieu and group therapy on the unit.  Crisis/safety plan discussed with patient.        This note was completed in part utilizing Dragon dictation Software. Grammatical, translation, syntax errors, random word insertions, spelling mistakes, and incomplete sentences may be an occasional consequence of this system secondary to software limitations with voice recognition, ambient noise, and hardware issues. If you have any questions or concerns about the content, text, or information contained within the body of this dictation, please contact the provider for clarification.

## 2024-09-27 NOTE — PROGRESS NOTES
09/27/24 0750   Team Meeting   Meeting Type Daily Rounds   Team Members Present   Team Members Present Physician;Nurse;;Other (Discipline and Name)   Physician Team Member Dr. Kemp / Dr. Ness / MEET Rios / NELL Nicolas   Nursing Team Member Jewel / Katlin   Care Management Team Member Suri / Emeli / Tre   Other (Discipline and Name) Sigmund (Group Facilitator)   Patient/Family Present   Patient Present No   Patient's Family Present No       Treatment Team Rounds Completed  Medical and Psychiatric Review Completed  D/C: Pt is reported to be withdrawn and reporting anxiety and depression. Pt is scheduled to discharge on 10/2/2024.

## 2024-09-27 NOTE — CASE MANAGEMENT
CM met with Pt to discuss discharge planning for next week. CM introduced self and stated that they will be working with him moving forward. CM stated that they will call the county when they are closer to a discharge date to see what D&A treatment he can get. Pt verbalized understanding.

## 2024-09-28 PROCEDURE — 99232 SBSQ HOSP IP/OBS MODERATE 35: CPT | Performed by: PSYCHIATRY & NEUROLOGY

## 2024-09-28 RX ADMIN — QUETIAPINE FUMARATE 50 MG: 25 TABLET ORAL at 08:16

## 2024-09-28 RX ADMIN — DIPHENHYDRAMINE HYDROCHLORIDE AND LIDOCAINE HYDROCHLORIDE AND ALUMINUM HYDROXIDE AND MAGNESIUM HYDRO 10 ML: KIT at 20:44

## 2024-09-28 RX ADMIN — QUETIAPINE FUMARATE 50 MG: 25 TABLET ORAL at 17:34

## 2024-09-28 RX ADMIN — DOXYCYCLINE 100 MG: 100 CAPSULE ORAL at 08:17

## 2024-09-28 RX ADMIN — BUPRENORPHINE AND NALOXONE 8 MG: 8; 2 FILM BUCCAL; SUBLINGUAL at 08:17

## 2024-09-28 RX ADMIN — FAMOTIDINE 20 MG: 20 TABLET, FILM COATED ORAL at 17:34

## 2024-09-28 RX ADMIN — BUPRENORPHINE AND NALOXONE 8 MG: 8; 2 FILM BUCCAL; SUBLINGUAL at 17:35

## 2024-09-28 RX ADMIN — QUETIAPINE 200 MG: 200 TABLET ORAL at 22:44

## 2024-09-28 RX ADMIN — IBUPROFEN 800 MG: 800 TABLET, FILM COATED ORAL at 23:06

## 2024-09-28 RX ADMIN — FAMOTIDINE 20 MG: 20 TABLET, FILM COATED ORAL at 08:17

## 2024-09-28 RX ADMIN — FLUOXETINE HYDROCHLORIDE 40 MG: 20 CAPSULE ORAL at 08:16

## 2024-09-28 NOTE — NURSING NOTE
Pt c/o fleeting SI due to medical issues and housing issues, will not act on feelings will come to staff if feeling unsafe, periods of irritability

## 2024-09-28 NOTE — PLAN OF CARE
Problem: SELF HARM/SUICIDALITY  Goal: Will have no self-injury during hospital stay  Description: INTERVENTIONS:  - Q 15 MINUTES: Routine safety checks  - Q WAKING SHIFT & PRN: Assess risk to determine if routine checks are adequate to maintain patient safety  - Encourage patient to participate actively in care by formulating a plan to combat response to suicidal ideation, identify supports and resources  Outcome: Progressing     Problem: DEPRESSION  Goal: Will be euthymic at discharge  Description: INTERVENTIONS:  - Administer medication as ordered  - Provide emotional support via 1:1 interaction with staff  - Encourage involvement in milieu/groups/activities  - Monitor for social isolation  Outcome: Progressing     Problem: BEHAVIOR  Goal: Pt/Family maintain appropriate behavior and adhere to behavioral management agreement, if implemented  Description: INTERVENTIONS:  - Assess the family dynamic   - Encourage verbalization of thoughts and concerns in a socially appropriate manner  - Assess patient/family's coping skills and non-compliant behavior (including use of illegal substances).  - Utilize positive, consistent limit setting strategies supporting safety of patient, staff and others  - Initiate consult with Case Management, Spiritual Care or other ancillary services as appropriate  - If a patient's/visitor's behavior jeopardizes the safety of the patient, staff, or others, refer to organization procedure.   - Notify Security of behavior or suspected illegal substances which indicate the need for search of the patient and/or belongings  - Encourage participation in the decision making process about a behavioral management agreement; implement if patient meets criteria  Outcome: Progressing     Problem: ANXIETY  Goal: Will report anxiety at manageable levels  Description: INTERVENTIONS:  - Administer medication as ordered  - Teach and encourage coping skills  - Provide emotional support  - Assess  patient/family for anxiety and ability to cope  Outcome: Progressing  Goal: By discharge: Patient will verbalize 2 strategies to deal with anxiety  Description: Interventions:  - Identify any obvious source/trigger to anxiety  - Staff will assist patient in applying identified coping technique/skills  - Encourage attendance of scheduled groups and activities  Outcome: Progressing     Problem: SLEEP DISTURBANCE  Goal: Will exhibit normal sleeping pattern  Description: Interventions:  -  Assess the patients sleep pattern, noting recent changes  - Administer medication as ordered  - Decrease environmental stimuli, including noise, as appropriate during the night  - Encourage the patient to actively participate in unit groups and or exercise during the day to enhance ability to achieve adequate sleep at night  - Assess the patient, in the morning, encouraging a description of sleep experience  Outcome: Progressing     Problem: PAIN - ADULT  Goal: Verbalizes/displays adequate comfort level or baseline comfort level  Description: Interventions:  - Encourage patient to monitor pain and request assistance  - Assess pain using appropriate pain scale  - Administer analgesics based on type and severity of pain and evaluate response  - Implement non-pharmacological measures as appropriate and evaluate response  - Consider cultural and social influences on pain and pain management  - Notify physician/advanced practitioner if interventions unsuccessful or patient reports new pain  Outcome: Progressing     Problem: INFECTION - ADULT  Goal: Absence or prevention of progression during hospitalization  Description: INTERVENTIONS:  - Assess and monitor for signs and symptoms of infection  - Monitor lab/diagnostic results  - Monitor all insertion sites, i.e. indwelling lines, tubes, and drains  - Monitor endotracheal if appropriate and nasal secretions for changes in amount and color  - [place] appropriate cooling/warming therapies  per order  - Administer medications as ordered  - Instruct and encourage patient and family to use good hand hygiene technique  - Identify and instruct in appropriate isolation precautions for identified infection/condition  Outcome: Progressing  Goal: Absence of fever/infection during neutropenic period  Description: INTERVENTIONS:  - Monitor WBC    Outcome: Progressing     Problem: SAFETY ADULT  Goal: Patient will remain free of falls  Description: INTERVENTIONS:  - Educate patient/family on patient safety including physical limitations  - Instruct patient to call for assistance with activity   - Consult OT/PT to assist with strengthening/mobility   - Keep Call bell within reach  - Keep bed low and locked with side rails adjusted as appropriate  - Keep care items and personal belongings within reach  - Initiate and maintain comfort rounds  - Make Fall Risk Sign visible to staff  - Apply yellow socks and bracelet for high fall risk patients  - Consider moving patient to room near nurses station  Outcome: Progressing     Problem: Prexisting or High Potential for Compromised Skin Integrity  Goal: Skin integrity is maintained or improved  Description: INTERVENTIONS:  - Identify patients at risk for skin breakdown  - Assess and monitor skin integrity  - Assess and monitor nutrition and hydration status  - Monitor labs   - Assess for incontinence   - Turn and reposition patient  - Assist with mobility/ambulation  - Relieve pressure over bony prominences  - Avoid friction and shearing  - Provide appropriate hygiene as needed including keeping skin clean and dry  - Evaluate need for skin moisturizer/barrier cream  - Collaborate with interdisciplinary team   - Patient/family teaching  - Consider wound care consult   Outcome: Progressing     Problem: Nutrition/Hydration-ADULT  Goal: Nutrient/Hydration intake appropriate for improving, restoring or maintaining nutritional needs  Description: Monitor and assess patient's  nutrition/hydration status for malnutrition. Collaborate with interdisciplinary team and initiate plan and interventions as ordered.  Monitor patient's weight and dietary intake as ordered or per policy. Utilize nutrition screening tool and intervene as necessary. Determine patient's food preferences and provide high-protein, high-caloric foods as appropriate.     INTERVENTIONS:  - Monitor oral intake, urinary output, labs, and treatment plans  - Assess nutrition and hydration status and recommend course of action  - Evaluate amount of meals eaten  - Assist patient with eating if necessary   - Allow adequate time for meals  - Recommend/ encourage appropriate diets, oral nutritional supplements, and vitamin/mineral supplements  - Order, calculate, and assess calorie counts as needed  - Recommend, monitor, and adjust tube feedings and TPN/PPN based on assessed needs  - Assess need for intravenous fluids  - Provide specific nutrition/hydration education as appropriate  - Include patient/family/caregiver in decisions related to nutrition  Outcome: Progressing

## 2024-09-28 NOTE — PROGRESS NOTES
Progress Note - Behavioral Health   Name: Perez Stack Jr. 53 y.o. male I MRN: [id number]  Unit/Bed#: -01 I Date of Admission: 9/21/2024   Date of Service: 9/28/2024 I Hospital Day: 7        Assessment & Plan  Bipolar disorder (HCC)  Continue Prozac 40 mg daily,  Seroquel to 200 mg mg at bedtime, 50 mg BID increased 9/27  Consult nutrition  Hepatitis C virus infection    Gastroesophageal reflux disease    Chronic hepatitis B virus infection (HCC)    Medical clearance for psychiatric admission    Severe stimulant use disorder (HCC)    Opioid use disorder in remission    Elevated LFTs    Toe pain    Low TSH level    Severe protein-calorie malnutrition (HCC)  Malnutrition Findings:   Adult Malnutrition type: Chronic illness  Adult Degree of Malnutrition: Other severe protein calorie malnutrition  Malnutrition Characteristics: Inadequate energy, Weight loss                  360 Statement: Pt presents with severe protein calorie malnutriion as evidenced by <75% po intake > 1 month and 13 lb (9%) wt loss in 4 months (5/31/24 141 lbs and 9/24/24 128 lbs). Treat with diet and Ensure High Protein BID.    BMI Findings:           Body mass index is 20.05 kg/m².        Current medications:  Current Facility-Administered Medications   Medication Dose Route Frequency Provider Last Rate    aluminum-magnesium hydroxide-simethicone  30 mL Oral Q4H PRN Shan Kemp MD      benztropine  1 mg Oral Q4H PRN Max 6/day Shan Kemp MD      buprenorphine-naloxone  8 mg Sublingual BID Shan Kemp MD      clotrimazole   Topical BID Maile Munoz PA-C      Diclofenac Sodium  4 g Topical 4x Daily Toni Mcclendon PA-C      hydrOXYzine HCL  50 mg Oral Q6H PRN Max 4/day Shan Kemp MD      Or    diphenhydrAMINE  50 mg Intramuscular Q6H PRN Shan Kemp MD      diphenhydramine, lidocaine, Al/Mg hydroxide, simethicone  10 mL Swish &  Spit Q4H PRN Raquel Perez PA-C      doxycycline hyclate  100 mg Oral Q12H [facility] Maile Munoz PA-C      famotidine  20 mg Oral BID Toni Mcclendon PA-C      FLUoxetine  40 mg Oral Daily Shan Kemp MD      hydrOXYzine HCL  100 mg Oral Q6H PRN Max 4/day Shan Kemp MD      Or    LORazepam  2 mg Intramuscular Q6H PRN Shan Kemp MD      hydrOXYzine HCL  25 mg Oral Q6H PRN Max 4/day Shan Kemp MD      ibuprofen  400 mg Oral Q4H PRN Shan Kemp MD      ibuprofen  600 mg Oral Q6H PRN Shan Kemp MD      ibuprofen  800 mg Oral Q8H PRN Shan Kemp MD      OLANZapine  10 mg Oral Q3H PRN Max 3/day Shan Kemp MD      Or    OLANZapine  10 mg Intramuscular Q3H PRN Max 3/day Shan Kemp MD      OLANZapine  5 mg Oral Q3H PRN Max 6/day Shan Kemp MD      Or    OLANZapine  5 mg Intramuscular Q3H PRN Max 6/day Shan Kemp MD      OLANZapine  2.5 mg Oral Q3H PRN Max 8/day Shan Kemp MD      polyethylene glycol  17 g Oral Daily PRN Shan Kemp MD      QUEtiapine  200 mg Oral HS Shan Kemp MD      QUEtiapine  50 mg Oral BID Shan Kemp MD      senna-docusate sodium  1 tablet Oral Daily PRN Shan Kemp MD      traZODone  50 mg Oral HS PRN Shan Kemp MD         Risks / Benefits of Treatment:    Risks, benefits, and possible side effects of medications explained to patient and patient verbalizes understanding and agreement for treatment.    Subjective:    Behavior over the last 24 hours: unchanged.     Seen in his room and resting in bed.  Complaining of daytime tiredness.  Reports improvement, slowly, and depression.  Not having suicidal thoughts or passive death wish quite here in the  hospital.  Feels if he were were not in the hospital he would be having those thoughts.  Motivated for rehab for drug and alcohol post discharge.    Sleep: slept off and on  Appetite: fair  Medication side effects: No   ROS: no complaints    Mental Status Evaluation:    Appearance:  age appropriate   Behavior:  pleasant, cooperative   Speech:  slow, soft   Mood:  depressed   Affect:  flat   Thought Process:  goal directed   Associations: intact associations   Thought Content:  no overt delusions   Perceptual Disturbances: none   Risk Potential: Suicidal ideation - None  Homicidal ideation - None  Potential for aggression - No   Sensorium:  oriented to person, place, and time/date   Memory:  recent and remote memory grossly intact   Consciousness:  alert and awake   Attention/Concentration: decreased concentration and decreased attention span   Insight:  fair   Judgment: improved   Gait/Station: normal gait/station, normal balance   Motor Activity: no abnormal movements     Vital signs in last 24 hours:    Temp:  [97.3 °F (36.3 °C)-98.1 °F (36.7 °C)] 97.3 °F (36.3 °C)  HR:  [71-92] 71  Resp:  [16] 16  BP: (110-121)/(64-79) 110/64         Laboratory results: I have personally reviewed all pertinent laboratory/tests results    Results from the past 24 hours: No results found for this or any previous visit (from the past 24 hour(s)).    Progress Toward Goals: progressing slowly    Counseling / Coordination of Care:    Patient's progress discussed with staff in treatment team meeting.  Patient's progress reviewed with nursing staff.      CLIF Mccartney 09/28/24

## 2024-09-28 NOTE — NURSING NOTE
Patient withdrawn to room this morning. Reports mild anxiety and depression this morning. Denies SI/HI/AVH. Reports feeling safe in the hospital. Patient denies any unmet needs at this time.

## 2024-09-28 NOTE — ASSESSMENT & PLAN NOTE
Continue Prozac 40 mg daily,  Seroquel to 200 mg mg at bedtime, 50 mg BID increased 9/27  Consult nutrition

## 2024-09-28 NOTE — TREATMENT TEAM
09/28/24 0800   Team Meeting   Meeting Type Daily Rounds   Team Members Present   Team Members Present Physician;Nurse   Physician Team Member Ivy Medrano   Nursing Team Member Rizwan   Patient/Family Present   Patient Present No   Patient's Family Present No     Daily Rounds: Pt reports fleeting SI.  Denies HI/AVH.  Refused Voltaren gel for right shoulder pain.

## 2024-09-29 PROCEDURE — 99232 SBSQ HOSP IP/OBS MODERATE 35: CPT | Performed by: PSYCHIATRY & NEUROLOGY

## 2024-09-29 RX ORDER — OLANZAPINE 5 MG/1
5 TABLET ORAL
Status: DISCONTINUED | OUTPATIENT
Start: 2024-09-29 | End: 2024-09-29

## 2024-09-29 RX ADMIN — QUETIAPINE 200 MG: 200 TABLET ORAL at 23:01

## 2024-09-29 RX ADMIN — QUETIAPINE FUMARATE 50 MG: 25 TABLET ORAL at 17:34

## 2024-09-29 RX ADMIN — FAMOTIDINE 20 MG: 20 TABLET, FILM COATED ORAL at 17:34

## 2024-09-29 RX ADMIN — QUETIAPINE FUMARATE 50 MG: 25 TABLET ORAL at 08:21

## 2024-09-29 RX ADMIN — FAMOTIDINE 20 MG: 20 TABLET, FILM COATED ORAL at 08:21

## 2024-09-29 RX ADMIN — FLUOXETINE HYDROCHLORIDE 40 MG: 20 CAPSULE ORAL at 08:22

## 2024-09-29 RX ADMIN — BUPRENORPHINE AND NALOXONE 8 MG: 8; 2 FILM BUCCAL; SUBLINGUAL at 17:34

## 2024-09-29 RX ADMIN — BUPRENORPHINE AND NALOXONE 8 MG: 8; 2 FILM BUCCAL; SUBLINGUAL at 06:19

## 2024-09-29 NOTE — NURSING NOTE
"Pt approached medication window clutching his face/jaw with both hands and requested Motrin for dental pain. Pt reports pain 10/10 on NPS and as such was offered Motrin 800mg. Pt accepted but was first educated on potential GI effects and length of time between possible doses. Pt stated \"I don't care. It hurts so bad\". PRN Motrin 800mg given PO at 2306. Next shift made aware for follow up.  "

## 2024-09-29 NOTE — PROGRESS NOTES
Progress Note - Behavioral Health   Name: Perez Stack Jr. 53 y.o. male I MRN: [id number]  Unit/Bed#: -01 I Date of Admission: 9/21/2024   Date of Service: 9/29/2024 I Hospital Day: 8        Assessment & Plan  Bipolar disorder (HCC)  Continue Prozac 40 mg daily,  Seroquel to 200 mg mg at bedtime, 50 mg BID increased 9/27  Consult nutrition  Hepatitis C virus infection    Gastroesophageal reflux disease    Chronic hepatitis B virus infection (HCC)    Medical clearance for psychiatric admission    Severe stimulant use disorder (HCC)    Opioid use disorder in remission    Elevated LFTs    Toe pain    Low TSH level    Severe protein-calorie malnutrition (HCC)  Malnutrition Findings:   Adult Malnutrition type: Chronic illness  Adult Degree of Malnutrition: Other severe protein calorie malnutrition  Malnutrition Characteristics: Inadequate energy, Weight loss                  360 Statement: Pt presents with severe protein calorie malnutriion as evidenced by <75% po intake > 1 month and 13 lb (9%) wt loss in 4 months (5/31/24 141 lbs and 9/24/24 128 lbs). Treat with diet and Ensure High Protein BID.    BMI Findings:           Body mass index is 20.05 kg/m².        Current medications:  Current Facility-Administered Medications   Medication Dose Route Frequency Provider Last Rate    aluminum-magnesium hydroxide-simethicone  30 mL Oral Q4H PRN Shan Kemp MD      benztropine  1 mg Oral Q4H PRN Max 6/day Shan Kemp MD      buprenorphine-naloxone  8 mg Sublingual BID Shan Kemp MD      clotrimazole   Topical BID Maile Munoz PA-C      Diclofenac Sodium  4 g Topical 4x Daily Toni Mcclendon PA-C      hydrOXYzine HCL  50 mg Oral Q6H PRN Max 4/day Shan Kemp MD      Or    diphenhydrAMINE  50 mg Intramuscular Q6H PRN Shan Kemp MD      diphenhydramine, lidocaine, Al/Mg hydroxide, simethicone  10 mL Swish &  Spit Q4H PRN Raquel Perez PA-C      famotidine  20 mg Oral BID Toni Mcclendon PA-C      FLUoxetine  40 mg Oral Daily Shan Kemp MD      hydrOXYzine HCL  100 mg Oral Q6H PRN Max 4/day Shan Kemp MD      Or    LORazepam  2 mg Intramuscular Q6H PRN Shan Kemp MD      hydrOXYzine HCL  25 mg Oral Q6H PRN Max 4/day Shan Kemp MD      ibuprofen  400 mg Oral Q4H PRN Shan Kemp MD      ibuprofen  600 mg Oral Q6H PRN Shan Kemp MD      ibuprofen  800 mg Oral Q8H PRN Shan Kemp MD      OLANZapine  10 mg Oral Q3H PRN Max 3/day Shan Kemp MD      Or    OLANZapine  10 mg Intramuscular Q3H PRN Max 3/day Shan Kemp MD      OLANZapine  5 mg Oral Q3H PRN Max 6/day Shan Kemp MD      Or    OLANZapine  5 mg Intramuscular Q3H PRN Max 6/day Clay Gauri Kemp MD      OLANZapine  2.5 mg Oral Q3H PRN Max 8/day Clay Gauri Kemp MD      polyethylene glycol  17 g Oral Daily PRN Shan Kemp MD      QUEtiapine  200 mg Oral HS Shan Kemp MD      QUEtiapine  50 mg Oral BID Shan Kemp MD      senna-docusate sodium  1 tablet Oral Daily PRN Shan Kemp MD      traZODone  50 mg Oral HS PRN Shan Kemp MD         Risks / Benefits of Treatment:    Risks, benefits, and possible side effects of medications explained to patient and patient verbalizes understanding and agreement for treatment.    Subjective:    Behavior over the last 24 hours: slowly improving.     Seen in his room and resting in bed.  Presents as flat and depressed.  Staff note some irritability.  Somewhat more guarded today and limited in conversation.  Reports pain today bothering him.  Continues to report improvements in depression and anxiety.   Denying hallucinations.  Denying suicidal thoughts or passive death wish.    Sleep: slept off and on  Appetite: fair  Medication side effects: No   ROS: reports body aches, all other systems are negative    Mental Status Evaluation:    Appearance:  dressed in hospital attire   Behavior:  cooperative, calm, guarded   Speech:  scant   Mood:  dysphoric, irritable   Affect:  blunted   Thought Process:  coherent, goal directed   Associations: intact associations   Thought Content:  no overt delusions   Perceptual Disturbances: none   Risk Potential: Suicidal ideation - None  Homicidal ideation - None  Potential for aggression - No   Sensorium:  oriented to person, place, and time/date   Memory:  recent and remote memory grossly intact   Consciousness:  alert and awake   Attention/Concentration: decreased concentration and decreased attention span   Insight:  limited   Judgment: limited   Gait/Station: normal gait/station, normal balance   Motor Activity: no abnormal movements     Vital signs in last 24 hours:    Temp:  [97.8 °F (36.6 °C)-98.8 °F (37.1 °C)] 98.8 °F (37.1 °C)  HR:  [72-79] 72  Resp:  [18] 18  BP: (104-118)/(65-74) 104/65         Laboratory results: I have personally reviewed all pertinent laboratory/tests results    Results from the past 24 hours: No results found for this or any previous visit (from the past 24 hour(s)).    Progress Toward Goals: limited improvement, depression is improving, poor motivation, still motivated for rehab posthospitalization    Counseling / Coordination of Care:    Patient's progress discussed with staff in treatment team meeting.  Medications, treatment progress and treatment plan reviewed with patient.  Reassurance and supportive therapy provided.        CLIF Mccartney 09/29/24

## 2024-09-29 NOTE — NURSING NOTE
"Patient demanding remote for television from [place], when the staff member told patient he would need to wait until they make another round he came to the nurses station and stated, \"no one fucking bother me at all today, I'm fine. No one come to my Fucking room.\" Patient reminded that we have to round on every patient Q 7 minutes. Patient then tried to close his door was redirected to have crack. Pt did follow redirection. Pt can be irritable and child-like when his demands are not met.   "

## 2024-09-29 NOTE — NURSING NOTE
Pt and roommate got into verbal disagreement. Pt at times speak's in condescending  tones towards peers and staff. Pt's roommate was able to walk away not engage  with pt. Pt has been redirected multiple times  with the way pt speaks to staff and peers through out his stay here with us. Floor nurse made aware.

## 2024-09-29 NOTE — NURSING NOTE
"Patient said\"Im going through some things, he does not want to talk about. Reports anxiety, denies depression SI HI AVH at present. Patient withdrawn and irritable, physical assessment WNL, accept heart murmur. Patient reports back and leg pain, when asked id he wanted anything reports tylenol allergy. Applies red band.Offered hot pack and cold pack, patient declined  "

## 2024-09-29 NOTE — NURSING NOTE
Slept for long intervals can be irritable and demanding  at times, poor coping skills and limited insight denies SI AVH at this time

## 2024-09-29 NOTE — NURSING NOTE
Patient reports slight improvement in dental pain, 8/10 at present time following magic mouthwash administration

## 2024-09-30 VITALS
RESPIRATION RATE: 17 BRPM | SYSTOLIC BLOOD PRESSURE: 129 MMHG | BODY MASS INDEX: 23.57 KG/M2 | DIASTOLIC BLOOD PRESSURE: 71 MMHG | HEART RATE: 79 BPM | WEIGHT: 150.2 LBS | OXYGEN SATURATION: 98 % | TEMPERATURE: 98.4 F | HEIGHT: 67 IN

## 2024-09-30 PROBLEM — Z00.8 MEDICAL CLEARANCE FOR PSYCHIATRIC ADMISSION: Status: RESOLVED | Noted: 2023-04-07 | Resolved: 2024-09-30

## 2024-09-30 PROCEDURE — 99239 HOSP IP/OBS DSCHRG MGMT >30: CPT | Performed by: PHYSICIAN ASSISTANT

## 2024-09-30 RX ORDER — CLOTRIMAZOLE 1 %
CREAM (GRAM) TOPICAL 2 TIMES DAILY
Qty: 40 G | Refills: 0 | Status: SHIPPED | OUTPATIENT
Start: 2024-09-30 | End: 2024-10-07

## 2024-09-30 RX ORDER — BUPRENORPHINE AND NALOXONE 8; 2 MG/1; MG/1
8 FILM, SOLUBLE BUCCAL; SUBLINGUAL 2 TIMES DAILY
Start: 2024-09-30 | End: 2024-10-07

## 2024-09-30 RX ORDER — FAMOTIDINE 20 MG/1
20 TABLET, FILM COATED ORAL 2 TIMES DAILY
Qty: 60 TABLET | Refills: 0 | Status: SHIPPED | OUTPATIENT
Start: 2024-09-30 | End: 2024-10-30

## 2024-09-30 RX ORDER — FLUOXETINE 40 MG/1
40 CAPSULE ORAL DAILY
Qty: 30 CAPSULE | Refills: 0 | Status: ON HOLD | OUTPATIENT
Start: 2024-09-30 | End: 2024-10-07

## 2024-09-30 RX ORDER — QUETIAPINE FUMARATE 50 MG/1
50 TABLET, FILM COATED ORAL 2 TIMES DAILY
Qty: 60 TABLET | Refills: 0 | Status: SHIPPED | OUTPATIENT
Start: 2024-09-30 | End: 2024-10-07

## 2024-09-30 RX ORDER — QUETIAPINE FUMARATE 200 MG/1
200 TABLET, FILM COATED ORAL
Qty: 30 TABLET | Refills: 0 | Status: SHIPPED | OUTPATIENT
Start: 2024-09-30 | End: 2024-10-07

## 2024-09-30 RX ADMIN — FLUOXETINE HYDROCHLORIDE 40 MG: 20 CAPSULE ORAL at 08:56

## 2024-09-30 RX ADMIN — FAMOTIDINE 20 MG: 20 TABLET, FILM COATED ORAL at 08:56

## 2024-09-30 RX ADMIN — BUPRENORPHINE AND NALOXONE 8 MG: 8; 2 FILM BUCCAL; SUBLINGUAL at 05:38

## 2024-09-30 RX ADMIN — QUETIAPINE FUMARATE 50 MG: 25 TABLET ORAL at 08:56

## 2024-09-30 NOTE — NURSING NOTE
Patient up early in AM. Compliant with breakfast and medications. Following medication administration patient returned to bed for a nap. [facility] Hopeful to have coverage for rehab upon discharge [facility]. [facility] and reinforced the need to follow up with required information to activate medical assistance. Denies SI or thoughts of self harm. Expressed frustration re: incident the previous evening re: accessing his phone, provided support and allowed patient to calmly express his concerns. Calm during interaction. No inappropriate behavior or language. Compliant with medications, denies side effects.

## 2024-09-30 NOTE — PLAN OF CARE
Problem: DISCHARGE PLANNING  Goal: Discharge to home or other facility with appropriate resources  Description: INTERVENTIONS:  - Identify barriers to discharge w/patient and caregiver  - Arrange for needed discharge resources and transportation as appropriate  - Identify discharge learning needs (meds, wound care, etc.)  - Arrange for interpretive services to assist at discharge as needed  - Refer to Case Management Department for coordinating discharge planning if the patient needs post-hospital services based on physician/advanced practitioner order or complex needs related to functional status, cognitive ability, or social support system  Outcome: Adequate for Discharge  Note: Pt is scheduled to discharge today to return to his aunt's residence. Pt was referred to [facility] for MHOP and for Drug & Alcohol treatment.

## 2024-09-30 NOTE — CASE MANAGEMENT
CM and provider met with Pt to discuss discharge planning for today. Pt stated that he had an incident with a staff member last night but things are good now. Pt stated that he is interested in having his Seroquel adjusted. Provider discussed medication being at the therapeutic level and Pt's good progress through the weekend. Provider stated that they feel the Pt is psychiatrically cleared for discharge at this point and they do not have grounds to keep him on the unit. CM discussed length of stay on the unit and that the CM can provide him with resources to get continued mental health treatment through the [facility]. CM stated that they have the Pt scheduled to discharge today and can call the [facility] to get him set up with treatment. Pt asked about his Suboxone. Provider stated that he had a script filled recently and should have more left over. CM asked who was prescribing his Suboxone prior to admission. Pt unable to identify a provider and stated that he got it at rehab through [facility]. CM stated that they will call the [facility] to see what they can offer as well. Pt verbalized understanding. CM stated that they will check back in.      CM called [facility] @[phone] to obtain MHOP. CM provided information to the  and they stated that they will have someone call the Pt to scheduled. CM asked about D&A treatment. CM was transferred to [facility] to discuss Suboxone treatment. CM left a voicemail.     CM met with Pt to discuss discharge for today. CM stated that they can set the Pt up with a Lyft to return to his residence. Pt reviewed and signed free local transportation waiver. CM stated that they contacted the [facility] to discuss MHOP and D&A treatment. CM stated that the [facility] will call him to follow up with MHOP. CM stated that they left a voicemail for D&A and they will give him a call when they hear back from them. Pt asked about his Suboxone. CM stated that the  provider had confirmed that he had obtained a script prior to coming to the hospital and should have 5 days worth left over. Pt verbalized understanding.     CM called [facility] @[phone] to discuss Suboxone treatment. They stated that the Pt can call [facility] and they will be able to help him with no insurance.     CM called Pt @[phone] to inform him of what [facility] said to do for Suboxone. Individual who answered the phone stated that it was the wrong number and not the Pt.     CM emailed the Pt [email or web address] to provide him with [facility] in [place] and [place] contact information.

## 2024-09-30 NOTE — PLAN OF CARE
Problem: SELF HARM/SUICIDALITY  Goal: Will have no self-injury during hospital stay  Description: INTERVENTIONS:  - Q 15 MINUTES: Routine safety checks  - Q WAKING SHIFT & PRN: Assess risk to determine if routine checks are adequate to maintain patient safety  - Encourage patient to participate actively in care by formulating a plan to combat response to suicidal ideation, identify supports and resources  Outcome: Adequate for Discharge     Problem: DEPRESSION  Goal: Will be euthymic at discharge  Description: INTERVENTIONS:  - Administer medication as ordered  - Provide emotional support via 1:1 interaction with staff  - Encourage involvement in milieu/groups/activities  - Monitor for social isolation  Outcome: Adequate for Discharge     Problem: BEHAVIOR  Goal: Pt/Family maintain appropriate behavior and adhere to behavioral management agreement, if implemented  Description: INTERVENTIONS:  - Assess the family dynamic   - Encourage verbalization of thoughts and concerns in a socially appropriate manner  - Assess patient/family's coping skills and non-compliant behavior (including use of illegal substances).  - Utilize positive, consistent limit setting strategies supporting safety of patient, staff and others  - Initiate consult with Case Management, Spiritual Care or other ancillary services as appropriate  - If a patient's/visitor's behavior jeopardizes the safety of the patient, staff, or others, refer to organization procedure.   - Notify Security of behavior or suspected illegal substances which indicate the need for search of the patient and/or belongings  - Encourage participation in the decision making process about a behavioral management agreement; implement if patient meets criteria  Outcome: Adequate for Discharge     Problem: ANXIETY  Goal: Will report anxiety at manageable levels  Description: INTERVENTIONS:  - Administer medication as ordered  - Teach and encourage coping skills  - Provide  emotional support  - Assess patient/family for anxiety and ability to cope  Outcome: Adequate for Discharge  Goal: By discharge: Patient will verbalize 2 strategies to deal with anxiety  Description: Interventions:  - Identify any obvious source/trigger to anxiety  - Staff will assist patient in applying identified coping technique/skills  - Encourage attendance of scheduled groups and activities  Outcome: Adequate for Discharge     Problem: SLEEP DISTURBANCE  Goal: Will exhibit normal sleeping pattern  Description: Interventions:  -  Assess the patients sleep pattern, noting recent changes  - Administer medication as ordered  - Decrease environmental stimuli, including noise, as appropriate during the night  - Encourage the patient to actively participate in unit groups and or exercise during the day to enhance ability to achieve adequate sleep at night  - Assess the patient, in the morning, encouraging a description of sleep experience  Outcome: Adequate for Discharge     Problem: DISCHARGE PLANNING  Goal: Discharge to home or other facility with appropriate resources  Description: INTERVENTIONS:  - Identify barriers to discharge w/patient and caregiver  - Arrange for needed discharge resources and transportation as appropriate  - Identify discharge learning needs (meds, wound care, etc.)  - Arrange for interpretive services to assist at discharge as needed  - Refer to Case Management Department for coordinating discharge planning if the patient needs post-hospital services based on physician/advanced practitioner order or complex needs related to functional status, cognitive ability, or social support system  Outcome: Adequate for Discharge     Problem: Ineffective Coping  Goal: Participates in unit activities  Description: Interventions:  - Provide therapeutic environment   - Provide required programming   - Redirect inappropriate behaviors   Outcome: Adequate for Discharge     Problem: PAIN - ADULT  Goal:  Verbalizes/displays adequate comfort level or baseline comfort level  Description: Interventions:  - Encourage patient to monitor pain and request assistance  - Assess pain using appropriate pain scale  - Administer analgesics based on type and severity of pain and evaluate response  - Implement non-pharmacological measures as appropriate and evaluate response  - Consider cultural and social influences on pain and pain management  - Notify physician/advanced practitioner if interventions unsuccessful or patient reports new pain  Outcome: Adequate for Discharge     Problem: INFECTION - ADULT  Goal: Absence or prevention of progression during hospitalization  Description: INTERVENTIONS:  - Assess and monitor for signs and symptoms of infection  - Monitor lab/diagnostic results  - Monitor all insertion sites, i.e. indwelling lines, tubes, and drains  - Monitor endotracheal if appropriate and nasal secretions for changes in amount and color  - [place] appropriate cooling/warming therapies per order  - Administer medications as ordered  - Instruct and encourage patient and family to use good hand hygiene technique  - Identify and instruct in appropriate isolation precautions for identified infection/condition  Outcome: Adequate for Discharge  Goal: Absence of fever/infection during neutropenic period  Description: INTERVENTIONS:  - Monitor WBC    Outcome: Adequate for Discharge     Problem: SAFETY ADULT  Goal: Patient will remain free of falls  Description: INTERVENTIONS:  - Educate patient/family on patient safety including physical limitations  - Instruct patient to call for assistance with activity   - Consult OT/PT to assist with strengthening/mobility   - Keep Call bell within reach  - Keep bed low and locked with side rails adjusted as appropriate  - Keep care items and personal belongings within reach  - Initiate and maintain comfort rounds  - Make Fall Risk Sign visible to staff  - Offer Toileting every Hours,  in advance of need  - Initiate/Maintain alarm  - Obtain necessary fall risk management equipment:   - Apply yellow socks and bracelet for high fall risk patients  - Consider moving patient to room near nurses station  Outcome: Adequate for Discharge     Problem: Prexisting or High Potential for Compromised Skin Integrity  Goal: Skin integrity is maintained or improved  Description: INTERVENTIONS:  - Identify patients at risk for skin breakdown  - Assess and monitor skin integrity  - Assess and monitor nutrition and hydration status  - Monitor labs   - Assess for incontinence   - Turn and reposition patient  - Assist with mobility/ambulation  - Relieve pressure over bony prominences  - Avoid friction and shearing  - Provide appropriate hygiene as needed including keeping skin clean and dry  - Evaluate need for skin moisturizer/barrier cream  - Collaborate with interdisciplinary team   - Patient/family teaching  - Consider wound care consult   Outcome: Adequate for Discharge     Problem: Nutrition/Hydration-ADULT  Goal: Nutrient/Hydration intake appropriate for improving, restoring or maintaining nutritional needs  Description: Monitor and assess patient's nutrition/hydration status for malnutrition. Collaborate with interdisciplinary team and initiate plan and interventions as ordered.  Monitor patient's weight and dietary intake as ordered or per policy. Utilize nutrition screening tool and intervene as necessary. Determine patient's food preferences and provide high-protein, high-caloric foods as appropriate.     INTERVENTIONS:  - Monitor oral intake, urinary output, labs, and treatment plans  - Assess nutrition and hydration status and recommend course of action  - Evaluate amount of meals eaten  - Assist patient with eating if necessary   - Allow adequate time for meals  - Recommend/ encourage appropriate diets, oral nutritional supplements, and vitamin/mineral supplements  - Order, calculate, and assess calorie  counts as needed  - Recommend, monitor, and adjust tube feedings and TPN/PPN based on assessed needs  - Assess need for intravenous fluids  - Provide specific nutrition/hydration education as appropriate  - Include patient/family/caregiver in decisions related to nutrition  Outcome: Adequate for Discharge

## 2024-09-30 NOTE — DISCHARGE SUMMARY
"Discharge Summary - Behavioral Health   Perez Stack Jr. 53 y.o. male MRN: [id number]  Unit/Bed#: U 212-01 Encounter: [id number]     Admission Date: 9/21/2024         Discharge Date: 9/30/24    Attending Psychiatrist: Shan Allen*    Assessment & Plan  Bipolar disorder (HCC)  Continue Prozac 40 mg daily,  Seroquel to 200 mg mg at bedtime, 50 mg BID increased 9/27  Consult nutrition  Hepatitis C virus infection  As per medicine recommendations  Gastroesophageal reflux disease  As per medicine recommendations  Chronic hepatitis B virus infection (HCC)  As per medicine recommendations  Severe stimulant use disorder (HCC)  Recent relapse on cocaine, encouraged cessation  Opioid use disorder in remission  On Suboxone  Elevated LFTs  As per medicine recommendations  Toe pain  As per medicine recommendations  Low TSH level  As per medicine recommendations  Severe protein-calorie malnutrition (HCC)  Malnutrition Findings:   Adult Malnutrition type: Chronic illness  Adult Degree of Malnutrition: Other severe protein calorie malnutrition  Malnutrition Characteristics: Inadequate energy, Weight loss                  360 Statement: Pt presents with severe protein calorie malnutriion as evidenced by <75% po intake > 1 month and 13 lb (9%) wt loss in 4 months (5/31/24 141 lbs and 9/24/24 128 lbs). Treat with diet and Ensure High Protein BID.    BMI Findings:           Body mass index is 23.52 kg/m².          Reason for Admission/HPI:     Per HPI from admission H&P obtained by Dr. Kemp on 9/22/24:    \"Per ED provider on 9/20:\"53-year-old male, concerns for depression, methamphetamine and crack cocaine smoking. Last use just prior to arrival. States the drug use is making him feel depressed, worthless and like he does not want to be in this world. No overt plans for how he would commit suicide. \"     Per Crisis worker on 9/21:\"The patient is a 54 y/o M who was self-referred for suicidality in the context of " "withdrawal from cocaine and methamphetamine. The patient has a history of bipolar I disorder, depression, psychosis, heavy cocaine use, IV methamphetamine use and IV opioid use, currently in remission since 8/12/2018. UDS was positive for cocaine. BAT was negative. Patient has a history of poor outpatient compliance with treatment. He stated he has been living on the streets, has little income, his relationship with his significant other, who was a stabilizing influence, has ended, and he remains estranged from his family. He has had a long period of poor sleep, appetite and nutrition, and has not been tending to his ADLs. He reports depressed, irritable mood and suicidal ideation with a plan to overdose on street drugs. The patient has a history of multiple attempts of this nature, with the last being about a year ago. He denies homicidal ideation. Patient has had poor focus and concentration, decreased energy and no motivation. He does have a history of auditory hallucinations, however, he has not been experiencing them for this episode. Denies visual hallucinations. There is no evidence of delusional thinking. He does not have a current provider, nor timely access to care in the community. His  insurance coverage has lapsed.The patient would benefit from an inpatient admission for safety and stabilization. He signed a voluntary admission agreement. \"     This is 53-year-old male with history of depression/anxiety/bipolar disorder/PTSD and substance use admitted to inpatient unit on voluntary status for worsening of mood, anxiety and suicidal ideations with plan in the context of psychosocial stressors and noncompliance with treatment.  Patient endorses depressed mood, anhedonia, tiredness, lack of motivation, poor sleep, poor appetite, weight loss, hopelessness and passive death wishes.  Denies any intent or plan currently, feels safe in the hospital.  Endorses racing thoughts, irritability, mood swings and " "distractibility.  Denies any symptoms of psychosis currently.\"      Social History       Tobacco History       Smoking Status  Some Days Current Packs/Day  0.3 packs/day Smoking Tobacco Type  Cigarettes   Pack Year History     Packs/Day From To Years    0.25   0.0      Smokeless Tobacco Use  Former              Alcohol History       Alcohol Use Status  No              Drug Use       Drug Use Status  Yes Types  \"Crack\" cocaine, Cocaine, Heroin, Methamphetamines Comment  UDS +Cocaine              Sexual Activity       Sexually Active  Not Currently              Activities of Daily Living    Not Asked                 Additional Substance Use Detail       Questions Responses    Problems Due to Past Use of Alcohol? No    Problems Due to Past Use of Substances? Yes    Substance Use Assessment Substance use within the past 12 months    Alcohol Use Frequency Denies use in past 12 months    Cannabis frequency Past regular use    Comment:  Past regular use on 4/8/2023     Heroin Frequency Prior dependence    Heroin Method IV    Heroin Last Use & Amount 08/    Heroin Longest Abstinence 18 months sober    Cocaine frequency Past occasional use    Comment:  3 or more times/week on 4/8/2023 3 or more times/week -> Past occasional use on 5/2/2023     Crack Cocaine Frequency 3 or more times/week    Methamphetamine Frequency 3 or more times/week    Crack Cocaine Method Smoke    Methamphetamine Method Smoke    Crack Cocaine Last Use & Amount 9/20/24    Methamphetamine Last Use & Amount 9/20/24    Narcotic Frequency Denies use in past 12 months    Benzodiazepine Frequency Denies use in past 12 months    Amphetamine frequency Experimented    Barbituate Frequency Denies use use in past 12 months    Inhalant frequency Never used    Comment:  Never used on 4/8/2023     Hallucinogen frequency Never used    Comment:  Never used on 4/8/2023     Ecstasy frequency Never used    Comment:  Never used on 4/8/2023     Other drug frequency Never " used    Comment:  Never used on 4/8/2023     Opiate frequency Experimented    Last reviewed by Jazlyn Carballo RN on 9/21/2024            Past Medical History:   Diagnosis Date    Addiction to drug (HCC)     Anxiety     Bipolar I disorder, most recent episode depressed, severe with psychotic features (HCC) 02/23/2016    Depression     Foot infection     Hallucination     Hepatitis C virus infection 02/23/2016    has had treatment    Psychiatric illness     Psychosis (HCC)     Self-injurious behavior     Substance abuse (HCC)     Suicide attempt (HCC)     Withdrawal symptoms, drug or narcotic (HCC)      No past surgical history on file.    Medications:    All current active medications have been reviewed.  Medications prior to admission:    Prior to Admission Medications   Prescriptions Last Dose Informant Patient Reported? Taking?   FLUoxetine (PROzac) 40 MG capsule   No No   Sig: Take 1 capsule (40 mg total) by mouth daily Do not start before May 13, 2023.   Patient not taking: Reported on 9/20/2024   QUEtiapine (SEROquel) 200 mg tablet   No No   Sig: Take 1 tablet (200 mg total) by mouth daily at bedtime   Patient not taking: Reported on 9/20/2024   cholecalciferol (VITAMIN D3) 1,000 units tablet   No No   Sig: Take 2 tablets (2,000 Units total) by mouth daily Do not start before May 13, 2023.   Patient not taking: Reported on 5/31/2024   doxepin (SINEquan) 10 mg capsule   No No   Sig: Take 1 capsule (10 mg total) by mouth daily at bedtime as needed for sleep   Patient not taking: Reported on 5/31/2024   omeprazole (PriLOSEC) 40 MG capsule   No No   Sig: Take 1 capsule (40 mg total) by mouth daily   Patient not taking: Reported on 9/20/2024      Facility-Administered Medications: None       Allergies:     Allergies   Allergen Reactions    Acetaminophen Vomiting     Pt. States it will hurt his liver       Objective     Vital signs in last 24 hours:    Temp:  [98.4 °F (36.9 °C)] 98.4 °F (36.9 °C)  HR:  [79-80]  79  Resp:  [16-17] 17  BP: (129-130)/(71-74) 129/71    No intake or output data in the 24 hours ending 09/30/24 5129    Hospital Course:     Perez was admitted to the inpatient psychiatric unit and started on Behavioral Health checks every 7 minutes. During the hospitalization he was encouraged to attend individual therapy, group therapy, milieu therapy and occupational therapy.    Psychiatric medications were adjusted over the hospital stay. To address depressive symptoms, mood swings, irritability, and anxiety symptoms, Perez was treated with antidepressant Prozac, antipsychotic medication Seroquel, and medications to control opioid withdrawal Suboxone. Medication doses were adjusted during the hospital course. Prozac was adjusted to 40mg daily . Seroquel was added and titrated to 50mg BID and 200mg qhs .  Suboxone  was continued at 8mg SL BID . Prior to beginning of treatment medications risks and benefits and possible side effects including risk of parkinsonian symptoms, Tardive Dyskinesia and metabolic syndrome related to treatment with antipsychotic medications and risk of suicidality and serotonin syndrome related to treatment with antidepressants were reviewed with Perez. He verbalized understanding and agreement for treatment. Upon admission Perez was seen by medical service for medical clearance for inpatient treatment and medical follow up.    Perez's symptoms improved over the hospital course. Initially after admission he was still feeling depressed, anxious, frustrated, labile, and irritable. With adjustment of medications and therapeutic milieu his symptoms slowly improved. At the end of treatment Perez was improved. His mood was more stable at the time of discharge. Perez denied suicidal ideation, intent or plan at the time of discharge and denied homicidal ideation, intent or plan at the time of discharge. There was no overt psychosis at the time of discharge. Perez was participating appropriately in milieu  at the time of discharge. Behavior was appropriate on the unit at the time of discharge. Sleep and appetite were improved. Perez was tolerating medications and was not reporting any significant side effects at the time of discharge.    Since Perez was doing well at the end of the hospitalization, treatment team felt that he could be safely discharged to outpatient care. We felt that at the end of the hospital stay Perez was at baseline and was ready for discharge.  Based on Perez's behaviors on the unit, his presentation was likely consistent with underlying cluster B personality pathology, as evidenced by his demonstrated traits of antisocial personality disorder - i.e. manipulative and staff splitting behaviors, disregard for unit rules and procedures, disrespectful and demanding behaviors toward staff.  However, no formal diagnosis of antisocial personality disorder was established.  Earlier in his hospitalization, he had endorsed fleeting suicidal thoughts in the context of homelessness, but this conditional suicidality had improved by the day of discharge.  PDMP records were reviewed regarding Suboxone, and patient's last fill was for a 15-day supply on 9/12/2024.  Based on the timing of this fill, he should still have at least 5 days of medication in his home supply (has been admitted since 9/22/2024).  As such, we will not be prescribing extra Suboxone at this time and he can continue following up with his outpatient prescriber.  Perez also felt stable and ready for discharge at the end of the hospital stay and the plan was to discharge him back to the care of his aunt whom he had stayed with in the past.    The outpatient follow up with  [facility]  was arranged by the unit  upon discharge.    Mental Status at Time of Discharge:     Appearance: casually dressed, appears consistent with stated age, normal grooming  Motor: no psychomotor disturbances, no gait  "abnormalities  Behavior: calm, cooperative, interacts with this writer appropriately  Speech: normal rate, rhythm, and volume  Mood: \"good\"  Affect: appropriate, normal range and intensity, mood-congruent  Thought Process: organized, linear, and goal-oriented; intact associations  Thought Content: denies any delusional material, no preoccupation  Perception: denies any auditory or visual hallucinations, denies other perceptual disturbances  Risk Potential: denies suicidal ideation, plan, or intent. Denies homicidal ideation  Sensorium: Oriented to person, place, time, and situation  Cognition: cognitive ability appears intact but was not quantitatively tested  Consciousness: alert and awake  Attention/Concentration: able to focus without difficulty, attention and concentration are age appropriate  Insight: improved  Judgement: improved    Admission Diagnosis:    Principal Problem:    Bipolar disorder (HCC)  Active Problems:    Hepatitis C virus infection    Gastroesophageal reflux disease    Chronic hepatitis B virus infection (HCC)    Severe stimulant use disorder (HCC)    Opioid use disorder in remission    Elevated LFTs    Toe pain    Low TSH level    Severe protein-calorie malnutrition (HCC)      Discharge Diagnosis:     Principal Problem:    Bipolar disorder (HCC)  Active Problems:    Hepatitis C virus infection    Gastroesophageal reflux disease    Chronic hepatitis B virus infection (HCC)    Severe stimulant use disorder (HCC)    Opioid use disorder in remission    Elevated LFTs    Toe pain    Low TSH level    Severe protein-calorie malnutrition (HCC)  Resolved Problems:    Medical clearance for psychiatric admission      Lab Results: I have personally reviewed all pertinent laboratory/tests results.  Most Recent Labs:   Lab Results   Component Value Date    WBC 3.30 (L) 09/22/2024    RBC 3.96 09/22/2024    HGB 12.6 09/22/2024    HCT 36.2 (L) 09/22/2024     09/22/2024    RDW 15.3 (H) 09/22/2024    " NEUTROABS 1.79 (L) 09/22/2024    SODIUM 146 09/22/2024    K 4.1 09/22/2024     (H) 09/22/2024    CO2 31 09/22/2024    BUN 16 09/22/2024    CREATININE 0.72 09/22/2024    GLUC 89 09/22/2024    GLUF 87 05/03/2023    CALCIUM 9.2 09/22/2024    AST 35 09/22/2024    ALT 38 09/22/2024    ALKPHOS 55 09/22/2024    TP 5.9 (L) 09/22/2024    ALB 3.7 09/22/2024    TBILI 1.12 (H) 09/22/2024    CHOLESTEROL 100 09/22/2024    HDL 42 09/22/2024    TRIG 51 09/22/2024    LDLCALC 48 09/22/2024    NONHDLC 58 09/22/2024    AMMONIA 38 (H) 04/06/2014    SFP2IFGVLVIN 0.462 09/22/2024    FREET4 0.82 10/20/2015    T3FREE 2.22 (L) 10/20/2015    RPR Non-Reactive 03/17/2016    HGBA1C 4.2 09/22/2024    EAG 74 09/22/2024       Discharge Medications:    See after visit summary for all reconciled discharge medications provided to patient and family.      Discharge instructions/Information to patient and family:     See after visit summary for information provided to patient and family.      Provisions for Follow-Up Care:    See after visit summary for information related to follow-up care and any pertinent home health orders.      Discharge Statement:    I spent 40 minutes discharging the patient. This time was spent on the day of discharge. I had direct contact with the patient on the day of discharge.     Additional documentation is required if more than 30 minutes were spent on discharge:    I reviewed with Perez importance of compliance with medications and outpatient treatment after discharge.  I discussed the medication regimen and possible side effects of the medications with Perez prior to discharge. At the time of discharge he was tolerating psychiatric medications.  I discussed outpatient follow up with Perez.  I reviewed with Perez crisis plan and safety plan upon discharge.  I discussed with Perez recommendation to follow up with outpatient drug and alcohol counseling and AA meetings.  Perez agreed to abstain from drug and alcohol use  after discharge.  Perez was competent to understand risks and benefits of withholding information and risks and benefits of his actions.  PDMP records were reviewed for Suboxone, last fill was for a 15-day supply on 9/12/2024.    Discharge on Two Antipsychotic Medications : Lesley Rios PA-C 09/30/24

## 2024-09-30 NOTE — PLAN OF CARE
Problem: SELF HARM/SUICIDALITY  Goal: Will have no self-injury during hospital stay  Description: INTERVENTIONS:  - Q 15 MINUTES: Routine safety checks  - Q WAKING SHIFT & PRN: Assess risk to determine if routine checks are adequate to maintain patient safety  - Encourage patient to participate actively in care by formulating a plan to combat response to suicidal ideation, identify supports and resources  9/30/2024 1302 by Brooklyn Mooney RN  Outcome: Adequate for Discharge  9/30/2024 1301 by Brooklyn Mooney RN  Outcome: Adequate for Discharge     Problem: DEPRESSION  Goal: Will be euthymic at discharge  Description: INTERVENTIONS:  - Administer medication as ordered  - Provide emotional support via 1:1 interaction with staff  - Encourage involvement in milieu/groups/activities  - Monitor for social isolation  9/30/2024 1302 by Brooklyn Mooney RN  Outcome: Adequate for Discharge  9/30/2024 1301 by Brooklyn Mooney RN  Outcome: Adequate for Discharge     Problem: BEHAVIOR  Goal: Pt/Family maintain appropriate behavior and adhere to behavioral management agreement, if implemented  Description: INTERVENTIONS:  - Assess the family dynamic   - Encourage verbalization of thoughts and concerns in a socially appropriate manner  - Assess patient/family's coping skills and non-compliant behavior (including use of illegal substances).  - Utilize positive, consistent limit setting strategies supporting safety of patient, staff and others  - Initiate consult with Case Management, Spiritual Care or other ancillary services as appropriate  - If a patient's/visitor's behavior jeopardizes the safety of the patient, staff, or others, refer to organization procedure.   - Notify Security of behavior or suspected illegal substances which indicate the need for search of the patient and/or belongings  - Encourage participation in the decision making process about a behavioral management agreement; implement if patient meets  criteria  9/30/2024 1302 by Brooklyn Mooney RN  Outcome: Adequate for Discharge  9/30/2024 1301 by Brooklyn Mooney RN  Outcome: Adequate for Discharge     Problem: ANXIETY  Goal: Will report anxiety at manageable levels  Description: INTERVENTIONS:  - Administer medication as ordered  - Teach and encourage coping skills  - Provide emotional support  - Assess patient/family for anxiety and ability to cope  9/30/2024 1302 by Brooklyn Mooney RN  Outcome: Adequate for Discharge  9/30/2024 1301 by Brooklyn Mooney RN  Outcome: Adequate for Discharge  Goal: By discharge: Patient will verbalize 2 strategies to deal with anxiety  Description: Interventions:  - Identify any obvious source/trigger to anxiety  - Staff will assist patient in applying identified coping technique/skills  - Encourage attendance of scheduled groups and activities  9/30/2024 1302 by Brooklyn Mooney RN  Outcome: Adequate for Discharge  9/30/2024 1301 by Brooklyn Mooney RN  Outcome: Adequate for Discharge     Problem: SLEEP DISTURBANCE  Goal: Will exhibit normal sleeping pattern  Description: Interventions:  -  Assess the patients sleep pattern, noting recent changes  - Administer medication as ordered  - Decrease environmental stimuli, including noise, as appropriate during the night  - Encourage the patient to actively participate in unit groups and or exercise during the day to enhance ability to achieve adequate sleep at night  - Assess the patient, in the morning, encouraging a description of sleep experience  9/30/2024 1302 by Brooklyn Mooney RN  Outcome: Adequate for Discharge  9/30/2024 1301 by Brooklyn Mooney RN  Outcome: Adequate for Discharge     Problem: DISCHARGE PLANNING  Goal: Discharge to home or other facility with appropriate resources  Description: INTERVENTIONS:  - Identify barriers to discharge w/patient and caregiver  - Arrange for needed discharge resources and transportation as appropriate  - Identify discharge learning  needs (meds, wound care, etc.)  - Arrange for interpretive services to assist at discharge as needed  - Refer to Case Management Department for coordinating discharge planning if the patient needs post-hospital services based on physician/advanced practitioner order or complex needs related to functional status, cognitive ability, or social support system  9/30/2024 1302 by Brooklyn Mooney RN  Outcome: Adequate for Discharge  9/30/2024 1301 by Brooklyn Mooney RN  Outcome: Adequate for Discharge     Problem: Ineffective Coping  Goal: Participates in unit activities  Description: Interventions:  - Provide therapeutic environment   - Provide required programming   - Redirect inappropriate behaviors   9/30/2024 1302 by Brooklyn Mooney RN  Outcome: Adequate for Discharge  9/30/2024 1301 by Brooklyn Mooney RN  Outcome: Adequate for Discharge     Problem: PAIN - ADULT  Goal: Verbalizes/displays adequate comfort level or baseline comfort level  Description: Interventions:  - Encourage patient to monitor pain and request assistance  - Assess pain using appropriate pain scale  - Administer analgesics based on type and severity of pain and evaluate response  - Implement non-pharmacological measures as appropriate and evaluate response  - Consider cultural and social influences on pain and pain management  - Notify physician/advanced practitioner if interventions unsuccessful or patient reports new pain  9/30/2024 1302 by Brooklyn Mooney RN  Outcome: Adequate for Discharge  9/30/2024 1301 by Brooklyn Mooney RN  Outcome: Adequate for Discharge     Problem: INFECTION - ADULT  Goal: Absence or prevention of progression during hospitalization  Description: INTERVENTIONS:  - Assess and monitor for signs and symptoms of infection  - Monitor lab/diagnostic results  - Monitor all insertion sites, i.e. indwelling lines, tubes, and drains  - Monitor endotracheal if appropriate and nasal secretions for changes in amount and color  -  [place] appropriate cooling/warming therapies per order  - Administer medications as ordered  - Instruct and encourage patient and family to use good hand hygiene technique  - Identify and instruct in appropriate isolation precautions for identified infection/condition  9/30/2024 1302 by Brooklyn Mooney RN  Outcome: Adequate for Discharge  9/30/2024 1301 by Brooklyn Mooney RN  Outcome: Adequate for Discharge  Goal: Absence of fever/infection during neutropenic period  Description: INTERVENTIONS:  - Monitor WBC    9/30/2024 1302 by Brooklyn Mooney RN  Outcome: Adequate for Discharge  9/30/2024 1301 by Brooklyn Mooney RN  Outcome: Adequate for Discharge     Problem: SAFETY ADULT  Goal: Patient will remain free of falls  Description: INTERVENTIONS:  - Educate patient/family on patient safety including physical limitations  - Instruct patient to call for assistance with activity   - Consult OT/PT to assist with strengthening/mobility   - Keep Call bell within reach  - Keep bed low and locked with side rails adjusted as appropriate  - Keep care items and personal belongings within reach  - Initiate and maintain comfort rounds  - Make Fall Risk Sign visible to staff  - Offer Toileting every  Hours, in advance of need  - Initiate/Maintain alarm  - Obtain necessary fall risk management equipment:   - Apply yellow socks and bracelet for high fall risk patients  - Consider moving patient to room near nurses station  9/30/2024 1302 by Brooklyn Mooney RN  Outcome: Adequate for Discharge  9/30/2024 1301 by Brooklyn Mooney RN  Outcome: Adequate for Discharge     Problem: Prexisting or High Potential for Compromised Skin Integrity  Goal: Skin integrity is maintained or improved  Description: INTERVENTIONS:  - Identify patients at risk for skin breakdown  - Assess and monitor skin integrity  - Assess and monitor nutrition and hydration status  - Monitor labs   - Assess for incontinence   - Turn and reposition patient  -  Assist with mobility/ambulation  - Relieve pressure over bony prominences  - Avoid friction and shearing  - Provide appropriate hygiene as needed including keeping skin clean and dry  - Evaluate need for skin moisturizer/barrier cream  - Collaborate with interdisciplinary team   - Patient/family teaching  - Consider wound care consult   9/30/2024 1302 by Brooklyn Mooney RN  Outcome: Adequate for Discharge  9/30/2024 1301 by Brooklyn Mooney RN  Outcome: Adequate for Discharge     Problem: Nutrition/Hydration-ADULT  Goal: Nutrient/Hydration intake appropriate for improving, restoring or maintaining nutritional needs  Description: Monitor and assess patient's nutrition/hydration status for malnutrition. Collaborate with interdisciplinary team and initiate plan and interventions as ordered.  Monitor patient's weight and dietary intake as ordered or per policy. Utilize nutrition screening tool and intervene as necessary. Determine patient's food preferences and provide high-protein, high-caloric foods as appropriate.     INTERVENTIONS:  - Monitor oral intake, urinary output, labs, and treatment plans  - Assess nutrition and hydration status and recommend course of action  - Evaluate amount of meals eaten  - Assist patient with eating if necessary   - Allow adequate time for meals  - Recommend/ encourage appropriate diets, oral nutritional supplements, and vitamin/mineral supplements  - Order, calculate, and assess calorie counts as needed  - Recommend, monitor, and adjust tube feedings and TPN/PPN based on assessed needs  - Assess need for intravenous fluids  - Provide specific nutrition/hydration education as appropriate  - Include patient/family/caregiver in decisions related to nutrition  9/30/2024 1302 by Brooklyn Mooney RN  Outcome: Adequate for Discharge  9/30/2024 1301 by Brooklyn Mooney RN  Outcome: Adequate for Discharge

## 2024-09-30 NOTE — PROGRESS NOTES
09/30/24 8670   Team Meeting   Meeting Type Daily Rounds   Team Members Present   Team Members Present Physician;Nurse;;Other (Discipline and Name)   Physician Team Member Dr. Holland / MEET Rios / NELL Nicolas   Nursing Team Member Rishi / Aren   Care Management Team Member Suri / Emeli / Tre / Garrett   Other (Discipline and Name) Patti (Group Facilitator)   Patient/Family Present   Patient Present No   Patient's Family Present No       Treatment Team Rounds Completed  Medical and Psychiatric Review Completed  D/C: Pt is scheduled to discharge today. Pt will be connected to [facility] for follow up treatment.

## 2024-09-30 NOTE — BH TRANSITION RECORD
Contact Information: If you have any questions, concerns, pended studies, tests and/or procedures, or emergencies regarding your inpatient behavioral health visit. Please contact [place] [facility] [phone] and ask to speak to a , nurse or physician. A contact is available 24 hours/ 7 days a week at this number.     Summary of Procedures Performed During your Stay:  Below is a list of major procedures performed during your hospital stay and a summary of results:  - Cardiac Procedures/Studies: EKG - NSR.  - Major Imaging Studies: XR shoulder R - normal.    Pending Studies (From admission, onward)      None          Please follow up on the above pending studies with your PCP and/or referring provider.

## 2024-09-30 NOTE — ASSESSMENT & PLAN NOTE
Malnutrition Findings:   Adult Malnutrition type: Chronic illness  Adult Degree of Malnutrition: Other severe protein calorie malnutrition  Malnutrition Characteristics: Inadequate energy, Weight loss                  360 Statement: Pt presents with severe protein calorie malnutriion as evidenced by <75% po intake > 1 month and 13 lb (9%) wt loss in 4 months (5/31/24 141 lbs and 9/24/24 128 lbs). Treat with diet and Ensure High Protein BID.    BMI Findings:           Body mass index is 23.52 kg/m².

## 2024-09-30 NOTE — NURSING NOTE
"Perez requested to use his cell phone to obtain phone numbers. [facility] informed patient he could not open the internet or send text messages. Patient began trying to type out text messages and was redirected. Perez became irate and began shouting at [facility], walked to TV room and began yelling, \"I don't like this stupid bitch. What the fuck?! People want to say stupid shit to me all the time. Leave me the fuck alone! I'm not talking to any of you! Get the fuck away from me!\" Security present on the unit, staff and security requesting patient to step away and speak calmly and patient repeatedly refused. Limits were set that patient would remain quiet and calm or he would be forcibly removed from TV room. Patient verbalized understanding, currently sitting next to security in TV room.   "

## 2024-09-30 NOTE — NURSING NOTE
Pt reports readiness for discharge. Denies SI/HI/AVH. AVH and discharge instructions reviewed. Pt verbalized understanding.

## 2024-10-01 ENCOUNTER — HOSPITAL ENCOUNTER (EMERGENCY)
Facility: HOSPITAL | Age: 54
End: 2024-10-02
Attending: EMERGENCY MEDICINE
Payer: OTHER GOVERNMENT

## 2024-10-01 VITALS
TEMPERATURE: 97.8 F | HEART RATE: 107 BPM | SYSTOLIC BLOOD PRESSURE: 166 MMHG | RESPIRATION RATE: 18 BRPM | DIASTOLIC BLOOD PRESSURE: 98 MMHG | OXYGEN SATURATION: 98 %

## 2024-10-01 DIAGNOSIS — E43 SEVERE PROTEIN-CALORIE MALNUTRITION (HCC): Primary | ICD-10-CM

## 2024-10-01 DIAGNOSIS — R45.851 SUICIDAL IDEATION: ICD-10-CM

## 2024-10-01 LAB
2HR DELTA HS TROPONIN: -1 NG/L
ALBUMIN SERPL BCG-MCNC: 4.3 G/DL (ref 3.5–5)
ALP SERPL-CCNC: 85 U/L (ref 34–104)
ALT SERPL W P-5'-P-CCNC: 86 U/L (ref 7–52)
AMPHETAMINES SERPL QL SCN: NEGATIVE
ANION GAP SERPL CALCULATED.3IONS-SCNC: 7 MMOL/L (ref 4–13)
AST SERPL W P-5'-P-CCNC: 99 U/L (ref 13–39)
ATRIAL RATE: 86 BPM
BARBITURATES UR QL: NEGATIVE
BASOPHILS # BLD AUTO: 0.02 THOUSANDS/ΜL (ref 0–0.1)
BASOPHILS NFR BLD AUTO: 0 % (ref 0–1)
BENZODIAZ UR QL: NEGATIVE
BILIRUB SERPL-MCNC: 1.38 MG/DL (ref 0.2–1)
BILIRUB UR QL STRIP: NEGATIVE
BUN SERPL-MCNC: 15 MG/DL (ref 5–25)
CALCIUM SERPL-MCNC: 9.2 MG/DL (ref 8.4–10.2)
CARDIAC TROPONIN I PNL SERPL HS: 14 NG/L
CARDIAC TROPONIN I PNL SERPL HS: 15 NG/L
CHLORIDE SERPL-SCNC: 100 MMOL/L (ref 96–108)
CLARITY UR: CLEAR
CO2 SERPL-SCNC: 29 MMOL/L (ref 21–32)
COCAINE UR QL: POSITIVE
COLOR UR: NORMAL
CREAT SERPL-MCNC: 0.64 MG/DL (ref 0.6–1.3)
EOSINOPHIL # BLD AUTO: 0.01 THOUSAND/ΜL (ref 0–0.61)
EOSINOPHIL NFR BLD AUTO: 0 % (ref 0–6)
ERYTHROCYTE [DISTWIDTH] IN BLOOD BY AUTOMATED COUNT: 15.8 % (ref 11.6–15.1)
ETHANOL EXG-MCNC: 0 MG/DL
FENTANYL UR QL SCN: POSITIVE
GFR SERPL CREATININE-BSD FRML MDRD: 113 ML/MIN/1.73SQ M
GLUCOSE SERPL-MCNC: 92 MG/DL (ref 65–140)
GLUCOSE UR STRIP-MCNC: NEGATIVE MG/DL
HCT VFR BLD AUTO: 35.3 % (ref 36.5–49.3)
HGB BLD-MCNC: 11.7 G/DL (ref 12–17)
HGB UR QL STRIP.AUTO: NEGATIVE
HYDROCODONE UR QL SCN: NEGATIVE
IMM GRANULOCYTES # BLD AUTO: 0.03 THOUSAND/UL (ref 0–0.2)
IMM GRANULOCYTES NFR BLD AUTO: 0 % (ref 0–2)
KETONES UR STRIP-MCNC: NEGATIVE MG/DL
LEUKOCYTE ESTERASE UR QL STRIP: NEGATIVE
LYMPHOCYTES # BLD AUTO: 0.51 THOUSANDS/ΜL (ref 0.6–4.47)
LYMPHOCYTES NFR BLD AUTO: 6 % (ref 14–44)
MCH RBC QN AUTO: 31 PG (ref 26.8–34.3)
MCHC RBC AUTO-ENTMCNC: 33.1 G/DL (ref 31.4–37.4)
MCV RBC AUTO: 94 FL (ref 82–98)
METHADONE UR QL: NEGATIVE
MONOCYTES # BLD AUTO: 0.61 THOUSAND/ΜL (ref 0.17–1.22)
MONOCYTES NFR BLD AUTO: 7 % (ref 4–12)
NEUTROPHILS # BLD AUTO: 7.61 THOUSANDS/ΜL (ref 1.85–7.62)
NEUTS SEG NFR BLD AUTO: 87 % (ref 43–75)
NITRITE UR QL STRIP: NEGATIVE
NRBC BLD AUTO-RTO: 0 /100 WBCS
OPIATES UR QL SCN: NEGATIVE
OXYCODONE+OXYMORPHONE UR QL SCN: NEGATIVE
P AXIS: 76 DEGREES
PCP UR QL: NEGATIVE
PH UR STRIP.AUTO: 7.5 [PH]
PLATELET # BLD AUTO: 295 THOUSANDS/UL (ref 149–390)
PMV BLD AUTO: 10.5 FL (ref 8.9–12.7)
POTASSIUM SERPL-SCNC: 3.8 MMOL/L (ref 3.5–5.3)
PR INTERVAL: 150 MS
PROT SERPL-MCNC: 7.3 G/DL (ref 6.4–8.4)
PROT UR STRIP-MCNC: NEGATIVE MG/DL
QRS AXIS: 61 DEGREES
QRSD INTERVAL: 88 MS
QT INTERVAL: 374 MS
QTC INTERVAL: 447 MS
RBC # BLD AUTO: 3.77 MILLION/UL (ref 3.88–5.62)
SODIUM SERPL-SCNC: 136 MMOL/L (ref 135–147)
SP GR UR STRIP.AUTO: 1.01 (ref 1–1.03)
T WAVE AXIS: 38 DEGREES
THC UR QL: NEGATIVE
TSH SERPL DL<=0.05 MIU/L-ACNC: 1.18 UIU/ML (ref 0.45–4.5)
UROBILINOGEN UR STRIP-ACNC: <2 MG/DL
VENTRICULAR RATE: 86 BPM
WBC # BLD AUTO: 8.79 THOUSAND/UL (ref 4.31–10.16)

## 2024-10-01 PROCEDURE — 80307 DRUG TEST PRSMV CHEM ANLYZR: CPT

## 2024-10-01 PROCEDURE — 93010 ELECTROCARDIOGRAM REPORT: CPT | Performed by: INTERNAL MEDICINE

## 2024-10-01 PROCEDURE — 93005 ELECTROCARDIOGRAM TRACING: CPT

## 2024-10-01 PROCEDURE — 36415 COLL VENOUS BLD VENIPUNCTURE: CPT

## 2024-10-01 PROCEDURE — 84443 ASSAY THYROID STIM HORMONE: CPT

## 2024-10-01 PROCEDURE — 80053 COMPREHEN METABOLIC PANEL: CPT

## 2024-10-01 PROCEDURE — 99285 EMERGENCY DEPT VISIT HI MDM: CPT

## 2024-10-01 PROCEDURE — 81003 URINALYSIS AUTO W/O SCOPE: CPT | Performed by: EMERGENCY MEDICINE

## 2024-10-01 PROCEDURE — 82075 ASSAY OF BREATH ETHANOL: CPT

## 2024-10-01 PROCEDURE — 85025 COMPLETE CBC W/AUTO DIFF WBC: CPT

## 2024-10-01 PROCEDURE — 99285 EMERGENCY DEPT VISIT HI MDM: CPT | Performed by: EMERGENCY MEDICINE

## 2024-10-01 PROCEDURE — 84484 ASSAY OF TROPONIN QUANT: CPT

## 2024-10-01 RX ORDER — RISPERIDONE 0.25 MG/1
0.25 TABLET ORAL
Status: CANCELLED | OUTPATIENT
Start: 2024-10-01

## 2024-10-01 RX ORDER — RISPERIDONE 0.25 MG/1
0.5 TABLET ORAL
Status: CANCELLED | OUTPATIENT
Start: 2024-10-01

## 2024-10-01 RX ORDER — HALOPERIDOL 5 MG/ML
5 INJECTION INTRAMUSCULAR
Status: CANCELLED | OUTPATIENT
Start: 2024-10-01

## 2024-10-01 RX ORDER — IBUPROFEN 400 MG/1
200 TABLET, FILM COATED ORAL EVERY 6 HOURS PRN
Status: CANCELLED | OUTPATIENT
Start: 2024-10-01

## 2024-10-01 RX ORDER — RISPERIDONE 1 MG/1
1 TABLET ORAL
Status: CANCELLED | OUTPATIENT
Start: 2024-10-01

## 2024-10-01 RX ORDER — PROPRANOLOL HCL 10 MG
5 TABLET ORAL EVERY 8 HOURS PRN
Status: CANCELLED | OUTPATIENT
Start: 2024-10-01

## 2024-10-01 RX ORDER — BUPRENORPHINE AND NALOXONE 8; 2 MG/1; MG/1
8 FILM, SOLUBLE BUCCAL; SUBLINGUAL EVERY 12 HOURS
Status: DISCONTINUED | OUTPATIENT
Start: 2024-10-01 | End: 2024-10-02 | Stop reason: HOSPADM

## 2024-10-01 RX ORDER — IBUPROFEN 600 MG/1
600 TABLET, FILM COATED ORAL EVERY 8 HOURS PRN
Status: CANCELLED | OUTPATIENT
Start: 2024-10-01

## 2024-10-01 RX ORDER — HYDROXYZINE HYDROCHLORIDE 25 MG/1
25 TABLET, FILM COATED ORAL
Status: CANCELLED | OUTPATIENT
Start: 2024-10-01

## 2024-10-01 RX ORDER — IBUPROFEN 400 MG/1
400 TABLET, FILM COATED ORAL EVERY 6 HOURS PRN
Status: CANCELLED | OUTPATIENT
Start: 2024-10-01

## 2024-10-01 RX ORDER — BUPRENORPHINE AND NALOXONE 8; 2 MG/1; MG/1
8 FILM, SOLUBLE BUCCAL; SUBLINGUAL DAILY
Status: DISCONTINUED | OUTPATIENT
Start: 2024-10-02 | End: 2024-10-01

## 2024-10-01 RX ORDER — HYDROXYZINE HYDROCHLORIDE 25 MG/1
50 TABLET, FILM COATED ORAL
Status: CANCELLED | OUTPATIENT
Start: 2024-10-01

## 2024-10-01 RX ORDER — TRAZODONE HYDROCHLORIDE 50 MG/1
50 TABLET, FILM COATED ORAL
Status: CANCELLED | OUTPATIENT
Start: 2024-10-01

## 2024-10-01 RX ORDER — LANOLIN ALCOHOL/MO/W.PET/CERES
3 CREAM (GRAM) TOPICAL
Status: CANCELLED | OUTPATIENT
Start: 2024-10-01

## 2024-10-01 RX ORDER — MAGNESIUM HYDROXIDE/ALUMINUM HYDROXICE/SIMETHICONE 120; 1200; 1200 MG/30ML; MG/30ML; MG/30ML
30 SUSPENSION ORAL EVERY 4 HOURS PRN
Status: CANCELLED | OUTPATIENT
Start: 2024-10-01

## 2024-10-01 RX ORDER — IBUPROFEN 600 MG/1
600 TABLET, FILM COATED ORAL ONCE
Status: COMPLETED | OUTPATIENT
Start: 2024-10-01 | End: 2024-10-01

## 2024-10-01 RX ADMIN — IBUPROFEN 600 MG: 600 TABLET, FILM COATED ORAL at 16:22

## 2024-10-01 RX ADMIN — BUPRENORPHINE AND NALOXONE 8 MG: 8; 2 FILM BUCCAL; SUBLINGUAL at 21:38

## 2024-10-01 NOTE — ED PROVIDER NOTES
Final diagnoses:   Suicidal ideation     ED Disposition       ED Disposition   Transfer to Another Facility    Condition   --    Date/Time   Wed Oct 2, 2024 10:07 AM    Comment   Perez Stack Jr. should be transferred out to [facility].               Assessment & Plan       Medical Decision Making  Patient is a 53 y.o. male with PMH of polysubstance abuse, bipolar disorder, recent admission for suicidal ideation who presents to the ED with complaint of suicidal ideation.    Vital signs pulse 107, otherwise vital signs within normal limits. On exam patient is alert, oriented, no evidence respiratory distress.    History and physical exam most consistent with depression, suicidal ideation. However, differential diagnosis included but not limited to bipolar disorder, polysubstance abuse. Plan EKG, UDS, consult ED crisis    View ED course above for further discussion on patient workup.     On review of previous records patient was recently admitted for suicidal ideation without a specific plan, the admission was voluntary, patient was admitted for a total of 9 days, discharged on Prozac, and Seroquel for diagnosis of bipolar disorder.    Laboratory evaluation demonstrates UDS positive for cocaine, positive for fentanyl, initial troponin 15, delta troponin of -1, CBC with hemoglobin of 11.7, dropped from about a point from prior, no evidence of active bleeding, CMP with evidence of AST and ALT minor elevation, minor elevation of total bilirubin, patient is history of prior transaminitis in setting of polysubstance abuse, possible history of hepatitis, TSH without abnormality, PCT alcohol without positive    Motrin/Tylenol given for headache, patient continues remain hemodynamically stable, patient medically cleared for inpatient psychiatric admission    All labs reviewed and utilized in the medical decision making process  All radiology studies independently viewed by me and interpreted by the  radiologist.  I reviewed all testing with the patient.     UDS positive for cocaine, positive for fentanyl, CBC with hemoglobin 11.7, CMP with elevation in AST/ALT, troponins without elevation, TSH normal breath alcohol not elevated.    Patient seen and evaluated by ED crisis team, ED crisis has found patient cooperative, willing to sign 201 for placement for inpatient psychiatric admission.    Upon re-evaluation patient continues remain hemodynamic stable, no new symptom/complaint, patient care transferred to ED night team, at time of transfer patient is voluntarily held for psychiatric purposes due to suicidal ideation, has signed a 201, no EMTALA in place at this time, patient is disposition pending transfer to facility for psychiatric treatment.       Amount and/or Complexity of Data Reviewed  Labs: ordered.    Risk  Prescription drug management.             Medications   ibuprofen (MOTRIN) tablet 600 mg (600 mg Oral Given 10/1/24 1622)   QUEtiapine (SEROquel) tablet 200 mg (200 mg Oral Given 10/2/24 0131)   QUEtiapine (SEROquel) tablet 50 mg (50 mg Oral Given 10/2/24 0831)       ED Risk Strat Scores                           SBIRT 20yo+      Flowsheet Row Most Recent Value   Initial Alcohol Screen: US AUDIT-C     1. How often do you have a drink containing alcohol? 0 Filed at: 10/01/2024 1308   2. How many drinks containing alcohol do you have on a typical day you are drinking?  0 Filed at: 10/01/2024 1308   3a. Male UNDER 65: How often do you have five or more drinks on one occasion? 0 Filed at: 10/01/2024 1308   3b. FEMALE Any Age, or MALE 65+: How often do you have 4 or more drinks on one occassion? 0 Filed at: 10/01/2024 1308   Audit-C Score 0 Filed at: 10/01/2024 1308   JEANA: How many times in the past year have you...    Used an illegal drug or used a prescription medication for non-medical reasons? Daily or Almost Daily Filed at: 10/01/2024 1308   DAST-10: In the past 12 months...    1. Have you used  "drugs other than those required for medical reasons? 1 Filed at: 10/01/2024 1308   2. Do you use more than one drug at a time? 1 Filed at: 10/01/2024 1308   3. Have you had medical problems as a result of your drug use (e.g., memory loss, hepatitis, convulsions, bleeding, etc.)? 1 Filed at: 10/01/2024 1308   4. Have you had \"blackouts\" or \"flashbacks\" as a result of drug use?YesNo 1 Filed at: 10/01/2024 1308   5. Do you ever feel bad or guilty about your drug use? 1 Filed at: 10/01/2024 1308   6. Does your spouse (or parent) ever complain about your involvement with drugs? 0 Filed at: 10/01/2024 1308   7. Have you neglected your family because of your use of drugs? 1 Filed at: 10/01/2024 1308   8. Have you engaged in illegal activities in order to obtain drugs? 0 Filed at: 10/01/2024 1308   9. Have you ever experienced withdrawal symptoms (felt sick) when you stopped taking drugs? 1 Filed at: 10/01/2024 1308   10. Are you always able to stop using drugs when you want to? 1 Filed at: 10/01/2024 1308   DAST-10 Score 8 Filed at: 10/01/2024 1308                            History of Present Illness       Chief Complaint   Patient presents with    Suicidal     Pt d/c from inpatient [facility] yesterday.  Pt stated since being discharged he wants to hurt himself.  Pt used meth and cocaine last night and stated he would try to harm himself any way he can.        Past Medical History:   Diagnosis Date    Addiction to drug (HCC)     Anxiety     Bipolar I disorder, most recent episode depressed, severe with psychotic features (HCC) 02/23/2016    Depression     Foot infection     Hallucination     Hepatitis C virus infection 02/23/2016    has had treatment    Psychiatric illness     Psychosis (HCC)     Self-injurious behavior     Substance abuse (HCC)     Suicide attempt (HCC)     Withdrawal symptoms, drug or narcotic (HCC)       No past surgical history on file.   Family History   Problem Relation Age of Onset    No Known Problems " "Mother     No Known Problems Father     No Known Problems Sister     No Known Problems Brother     No Known Problems Maternal Aunt     No Known Problems Paternal Aunt     No Known Problems Maternal Uncle     No Known Problems Paternal Uncle     No Known Problems Maternal Grandfather     No Known Problems Maternal Grandmother     No Known Problems Paternal Grandfather     No Known Problems Paternal Grandmother     No Known Problems Cousin     ADD / ADHD Neg Hx     Alcohol abuse Neg Hx     Anxiety disorder Neg Hx     Bipolar disorder Neg Hx     Dementia Neg Hx     Depression Neg Hx     Drug abuse Neg Hx     OCD Neg Hx     Paranoid behavior Neg Hx     Schizophrenia Neg Hx     Seizures Neg Hx     Self-Injury Neg Hx     Suicide Attempts Neg Hx       Social History     Tobacco Use    Smoking status: Some Days     Current packs/day: 0.25     Types: Cigarettes    Smokeless tobacco: Former   Vaping Use    Vaping status: Former    Substances: Nicotine, Flavoring   Substance Use Topics    Alcohol use: No    Drug use: Yes     Types: Cocaine, \"Crack\" cocaine, Methamphetamines, Heroin     Comment: UDS +Cocaine      E-Cigarette/Vaping    E-Cigarette Use Former User       E-Cigarette/Vaping Substances    Nicotine Yes     THC No     CBD No     Flavoring Yes     Other No     Unknown No       I have reviewed and agree with the history as documented.     Patient is a 53-year-old male with past medical history of bipolar disorder, polysubstance use, opioid abuse in remission on Suboxone, patient reports he was recently hospitalized for suicidal ideation/depression, he was discharged with prescription for Prozac and Seroquel, patient reporting that he feels like he was pushed out of inpatient admission prior to being ready, reports he still feels depressed, still feels anxious, feels he is at his worst, still complaining of suicidal ideation, no specific plan, states he is taking drugs on outpatient basis with hopes of dying, reports " after leaving the hospital he obtained both crack cocaine and methamphetamine, reports methamphetamine use greater than 24 hours ago, reports crack cocaine use just prior to arrival, reports he also incidentally obtained some dope which he was not trying to obtain but was mixed in with his crack cocaine, reports having smoked this as well and for this reason his UDS is going to be positive, reports he has been compliant with Suboxone use, still feeling suicidal at this time, again reporting no specific plan, just stating that he feels unwell, states he was seen wandering around this morning when a friend picked him up, friend determined that he was psychologically unwell and needed to come to the emergency department.  Patient reporting that he is depressed because he has no support network, reports not originally being from the area and since moving to the area is very disconnected.         Review of Systems        Objective       ED Triage Vitals [10/01/24 1307]   Temperature Pulse Blood Pressure Respirations SpO2 Patient Position - Orthostatic VS   97.8 °F (36.6 °C) (!) 107 166/98 18 98 % --      Temp Source Heart Rate Source BP Location FiO2 (%) Pain Score    Temporal Monitor -- -- --      Vitals      Date and Time Temp Pulse SpO2 Resp BP Pain Score FACES Pain Rating User   10/01/24 1307 97.8 °F (36.6 °C) 107 98 % 18 166/98 -- -- MK            Physical Exam  Vitals and nursing note reviewed.   Constitutional:       General: He is not in acute distress.     Appearance: He is well-developed. He is not toxic-appearing.   HENT:      Head: Normocephalic and atraumatic.   Eyes:      Conjunctiva/sclera: Conjunctivae normal.   Cardiovascular:      Rate and Rhythm: Normal rate and regular rhythm.      Heart sounds: No murmur heard.  Pulmonary:      Effort: Pulmonary effort is normal. No respiratory distress.      Breath sounds: Normal breath sounds.   Abdominal:      Palpations: Abdomen is soft.      Tenderness: There is  no abdominal tenderness. There is no guarding or rebound.   Musculoskeletal:         General: No swelling.      Cervical back: Neck supple.   Skin:     General: Skin is warm and dry.      Capillary Refill: Capillary refill takes less than 2 seconds.      Coloration: Skin is not pale.      Findings: No bruising, erythema or rash.   Neurological:      Mental Status: He is alert.   Psychiatric:         Mood and Affect: Mood normal.         Results Reviewed       Procedure Component Value Units Date/Time    UA w Reflex to Microscopic w Reflex to Culture [[id number]] Collected: 10/01/24 1423    Lab Status: Final result Specimen: Urine, Other Updated: 10/01/24 2216     Color, UA Light Yellow     Clarity, UA Clear     Specific Gravity, UA 1.011     pH, UA 7.5     Leukocytes, UA Negative     Nitrite, UA Negative     Protein, UA Negative mg/dl      Glucose, UA Negative mg/dl      Ketones, UA Negative mg/dl      Urobilinogen, UA <2.0 mg/dl      Bilirubin, UA Negative     Occult Blood, UA Negative    HS Troponin I 2hr [[id number]]  (Normal) Collected: 10/01/24 1622    Lab Status: Final result Specimen: Blood from Arm, Left Updated: 10/01/24 1659     hs TnI 2hr 14 ng/L      Delta 2hr hsTnI -1 ng/L     Comprehensive metabolic panel [[id number]]  (Abnormal) Collected: 10/01/24 1503    Lab Status: Final result Specimen: Blood from Arm, Right Updated: 10/01/24 1552     Sodium 136 mmol/L      Potassium 3.8 mmol/L      Chloride 100 mmol/L      CO2 29 mmol/L      ANION GAP 7 mmol/L      BUN 15 mg/dL      Creatinine 0.64 mg/dL      Glucose 92 mg/dL      Calcium 9.2 mg/dL      AST 99 U/L      ALT 86 U/L      Alkaline Phosphatase 85 U/L      Total Protein 7.3 g/dL      Albumin 4.3 g/dL      Total Bilirubin 1.38 mg/dL      eGFR 113 ml/min/1.73sq m     Narrative:      [employer] Kid[employer] guidelines for Chronic Kidney Disease (CKD):     Stage 1 with normal or high GFR (GFR > 90 mL/min/1.73 square meters)    Stage 2 Mild CKD (GFR  = 60-89 mL/min/1.73 square meters)    Stage 3A Moderate CKD (GFR = 45-59 mL/min/1.73 square meters)    Stage 3B Moderate CKD (GFR = 30-44 mL/min/1.73 square meters)    Stage 4 Severe CKD (GFR = 15-29 mL/min/1.73 square meters)    Stage 5 End Stage CKD (GFR <15 mL/min/1.73 square meters)  Note: GFR calculation is accurate only with a steady state creatinine    TSH [[id number]]  (Normal) Collected: 10/01/24 1503    Lab Status: Final result Specimen: Blood from Arm, Right Updated: 10/01/24 1552     TSH 3RD GENERATON 1.176 uIU/mL     CBC and differential [[id number]]  (Abnormal) Collected: 10/01/24 1503    Lab Status: Final result Specimen: Blood from Arm, Right Updated: 10/01/24 1516     WBC 8.79 Thousand/uL      RBC 3.77 Million/uL      Hemoglobin 11.7 g/dL      Hematocrit 35.3 %      MCV 94 fL      MCH 31.0 pg      MCHC 33.1 g/dL      RDW 15.8 %      MPV 10.5 fL      Platelets 295 Thousands/uL      nRBC 0 /100 WBCs      Segmented % 87 %      Immature Grans % 0 %      Lymphocytes % 6 %      Monocytes % 7 %      Eosinophils Relative 0 %      Basophils Relative 0 %      Absolute Neutrophils 7.61 Thousands/µL      Absolute Immature Grans 0.03 Thousand/uL      Absolute Lymphocytes 0.51 Thousands/µL      Absolute Monocytes 0.61 Thousand/µL      Eosinophils Absolute 0.01 Thousand/µL      Basophils Absolute 0.02 Thousands/µL     POCT alcohol breath test [[id number]]  (Normal) Resulted: 10/01/24 1504    Lab Status: Final result Updated: 10/01/24 1504     EXTBreath Alcohol 0.000    HS Troponin 0hr (reflex protocol) [[id number]]  (Normal) Collected: 10/01/24 1423    Lab Status: Final result Specimen: Blood from Arm, Left Updated: 10/01/24 1501     hs TnI 0hr 15 ng/L     Rapid drug screen, urine [[id number]]  (Abnormal) Collected: 10/01/24 1423    Lab Status: Final result Specimen: Urine, Other Updated: 10/01/24 1458     Amph/Meth UR Negative     Barbiturate Ur Negative     Benzodiazepine Urine Negative     Cocaine Urine Positive      Methadone Urine Negative     Opiate Urine Negative     PCP Ur Negative     THC Urine Negative     Oxycodone Urine Negative     Fentanyl Urine Positive     HYDROCODONE URINE Negative    Narrative:      Presumptive report. If requested, specimen will be sent to reference lab for confirmation.  FOR MEDICAL PURPOSES ONLY.   IF CONFIRMATION NEEDED PLEASE CONTACT THE LAB WITHIN 5 DAYS.    Drug Screen Cutoff Levels:  AMPHETAMINE/METHAMPHETAMINES  1000 ng/mL  BARBITURATES     200 ng/mL  BENZODIAZEPINES     200 ng/mL  COCAINE      300 ng/mL  METHADONE      300 ng/mL  OPIATES      300 ng/mL  PHENCYCLIDINE     25 ng/mL  THC       50 ng/mL  OXYCODONE      100 ng/mL  FENTANYL      5 ng/mL  HYDROCODONE     300 ng/mL            No orders to display       Procedures    ED Medication and Procedure Management   Prior to Admission Medications   Prescriptions Last Dose Informant Patient Reported? Taking?   Diclofenac Sodium (VOLTAREN) 1 %   No No   Sig: Apply 4 g topically 4 (four) times a day   FLUoxetine (PROzac) 40 MG capsule   No No   Sig: Take 1 capsule (40 mg total) by mouth daily   QUEtiapine (SEROquel) 200 mg tablet   No No   Sig: Take 1 tablet (200 mg total) by mouth daily at bedtime   QUEtiapine (SEROquel) 50 mg tablet   No No   Sig: Take 1 tablet (50 mg total) by mouth 2 (two) times a day   buprenorphine-naloxone (Suboxone) 8-2 mg   No No   Sig: Place 1 Film (8 mg total) under the tongue 2 (two) times a day for 1 day   clotrimazole (LOTRIMIN) 1 % cream   No No   Sig: Apply topically 2 (two) times a day   famotidine (PEPCID) 20 mg tablet   No No   Sig: Take 1 tablet (20 mg total) by mouth 2 (two) times a day      Facility-Administered Medications: None     Discharge Medication List as of 10/2/2024 10:08 AM        CONTINUE these medications which have NOT CHANGED    Details   clotrimazole (LOTRIMIN) 1 % cream Apply topically 2 (two) times a day, Starting Mon 9/30/2024, Print      Diclofenac Sodium (VOLTAREN) 1 % Apply 4 g  topically 4 (four) times a day, Starting Mon 9/30/2024, Print      famotidine (PEPCID) 20 mg tablet Take 1 tablet (20 mg total) by mouth 2 (two) times a day, Starting Mon 9/30/2024, Until Wed 10/30/2024, Print      FLUoxetine (PROzac) 40 MG capsule Take 1 capsule (40 mg total) by mouth daily, Starting Mon 9/30/2024, Until Wed 10/30/2024, Print      !! QUEtiapine (SEROquel) 200 mg tablet Take 1 tablet (200 mg total) by mouth daily at bedtime, Starting Mon 9/30/2024, Until Wed 10/30/2024, Print      !! QUEtiapine (SEROquel) 50 mg tablet Take 1 tablet (50 mg total) by mouth 2 (two) times a day, Starting Mon 9/30/2024, Until Wed 10/30/2024, Print       !! - Potential duplicate medications found. Please discuss with provider.        STOP taking these medications       buprenorphine-naloxone (Suboxone) 8-2 mg Comments:   Reason for Stopping:             No discharge procedures on file.  ED SEPSIS DOCUMENTATION   Time reflects when diagnosis was documented in both MDM as applicable and the Disposition within this note       Time User Action Codes Description Comment    10/1/2024  9:50 PM Kandice Vicente Add [E43] Severe protein-calorie malnutrition (HCC)     10/2/2024  4:07 PM Law Tracey [R45.851] Suicidal ideation                  Yaw Tanner DO  10/06/24 0821

## 2024-10-01 NOTE — ED ATTENDING ATTESTATION
Final Diagnoses:     1. Severe protein-calorie malnutrition (HCC)      ED Course as of 10/02/24 1059   Tue Oct 01, 2024   1707 AST(!): 99   1707 ALT(!): 86  Transaminitis like 11 days ago.    1707 FENTANYL URINE(!): Positive   1707 COCAINE URINE(!): Positive   1707 WBC: 8.79   1707 Hemoglobin(!): 11.7   1707 Platelet Count: 295       I, Law Tracey MD, saw and evaluated the patient. All available labs and X-rays were ordered by me or the resident / non-physician and have been reviewed by myself. I discussed the patient with the resident / non-physician and agree with the resident's / non-physician practitioner's findings and plan as documented in the resident's / non-physician practicitioner's note, except where noted.   At this point, I agree with the current assessment done in the ED.   I was present during key portions of all procedures performed unless otherwise stated.     HPI:  NURSING TRIAGE:    This is a 53 y.o. male presenting for evaluation of suicidal ideation. Just DC-ed yesterday. Admitted for bipolar with SI w/o plan.  Was on Prozac, Seroquel. Still feels the same.  No explicit plan.   Took drugs since DC. Crack cocaine, Meth, dope but the heroin was accidental, so the dealer apologized by giving twice as much crack to make up for it.  Some CP/headache. .  Chief Complaint   Patient presents with    Suicidal     Pt d/c from inpatient [facility] yesterday.  Pt stated since being discharged he wants to hurt himself.  Pt used meth and cocaine last night and stated he would try to harm himself any way he can.       PHYSICAL: ASSESSMENT + PLAN:   General: VS reviewed  Appears in NAD  awake, alert.   Well-nourished, well-developed. Appears stated age.   Speaking normally in full sentences.   Head: Normocephalic, atraumatic  Eyes: EOM-I. No diplopia.   No hyphema.   No subconjunctival hemorrhages.  Symmetrical lids.   ENT: Atraumatic external nose and ears.    MMM  No malocclusion. No stridor. Normal phonation.  "No drooling. Normal swallowing.   Neck: No JVD.  CV: No pallor noted  Lungs:   No tachypnea  No respiratory distress  Abd: soft nt nd no rebound/guarding  MSK:   FROM spontaneously  Skin: Dry, intact.   Neuro: Awake, alert, GCS15, CN II-XII grossly intact.   Motor grossly intact.  Psychiatric/Behavioral: interacting normally; appropriate mood/affect.    Exam: deferred    Vitals:    10/01/24 1307   BP: 166/98   Pulse: (!) 107   Resp: 18   Temp: 97.8 °F (36.6 °C)   TempSrc: Temporal   SpO2: 98%    EKG/Trop given crack-cocaine use.   CRISIS  Dispo.     There are no obvious limitations to social determinants of care.   Nursing note reviewed.   Vitals reviewed.   Orders placed by myself and/or advanced practitioner / resident.    Previous chart was reviewed  No language barrier.   History obtained from patient.    There are no limitations to the history obtained:     Past Medical: Past Surgical:    has a past medical history of Addiction to drug ([facility]), Anxiety, Bipolar I disorder, most recent episode depressed, severe with psychotic features ([facility]) (02/23/2016), Depression, Foot infection, Hallucination, Hepatitis C virus infection (02/23/2016), Psychiatric illness, Psychosis ([facility]), Self-injurious behavior, Substance abuse ([facility]), Suicide attempt ([facility]), and Withdrawal symptoms, drug or narcotic ([facility]).  has no past surgical history on file.   Social: Cardiac (Echo/Cath)   Social History     Substance and Sexual Activity   Alcohol Use No     Social History     Tobacco Use   Smoking Status Some Days    Current packs/day: 0.25    Types: Cigarettes   Smokeless Tobacco Former     Social History     Substance and Sexual Activity   Drug Use Yes    Types: Cocaine, \"Crack\" cocaine, Methamphetamines, Heroin    Comment: UDS +Cocaine    No results found for this or any previous visit.    No results found for this or any previous visit.    No results found for this or any previous visit.     Labs: Imaging:   Labs Reviewed   RAPID DRUG " SCREEN, URINE - Abnormal       Result Value Ref Range Status    Amph/Meth UR Negative  Negative Final    Barbiturate Ur Negative  Negative Final    Benzodiazepine Urine Negative  Negative Final    Cocaine Urine Positive (*) Negative Final    Methadone Urine Negative  Negative Final    Opiate Urine Negative  Negative Final    PCP Ur Negative  Negative Final    THC Urine Negative  Negative Final    Oxycodone Urine Negative  Negative Final    Fentanyl Urine Positive (*) Negative Final    HYDROCODONE URINE Negative  Negative Final    Narrative:     Presumptive report. If requested, specimen will be sent to reference lab for confirmation.  FOR MEDICAL PURPOSES ONLY.   IF CONFIRMATION NEEDED PLEASE CONTACT THE LAB WITHIN 5 DAYS.    Drug Screen Cutoff Levels:  AMPHETAMINE/METHAMPHETAMINES  1000 ng/mL  BARBITURATES     200 ng/mL  BENZODIAZEPINES     200 ng/mL  COCAINE      300 ng/mL  METHADONE      300 ng/mL  OPIATES      300 ng/mL  PHENCYCLIDINE     25 ng/mL  THC       50 ng/mL  OXYCODONE      100 ng/mL  FENTANYL      5 ng/mL  HYDROCODONE     300 ng/mL   CBC AND DIFFERENTIAL - Abnormal    WBC 8.79  4.31 - 10.16 Thousand/uL Final    RBC 3.77 (*) 3.88 - 5.62 Million/uL Final    Hemoglobin 11.7 (*) 12.0 - 17.0 g/dL Final    Hematocrit 35.3 (*) 36.5 - 49.3 % Final    MCV 94  82 - 98 fL Final    MCH 31.0  26.8 - 34.3 pg Final    MCHC 33.1  31.4 - 37.4 g/dL Final    RDW 15.8 (*) 11.6 - 15.1 % Final    MPV 10.5  8.9 - 12.7 fL Final    Platelets 295  149 - 390 Thousands/uL Final    nRBC 0  /100 WBCs Final    Segmented % 87 (*) 43 - 75 % Final    Immature Grans % 0  0 - 2 % Final    Lymphocytes % 6 (*) 14 - 44 % Final    Monocytes % 7  4 - 12 % Final    Eosinophils Relative 0  0 - 6 % Final    Basophils Relative 0  0 - 1 % Final    Absolute Neutrophils 7.61  1.85 - 7.62 Thousands/µL Final    Absolute Immature Grans 0.03  0.00 - 0.20 Thousand/uL Final    Absolute Lymphocytes 0.51 (*) 0.60 - 4.47 Thousands/µL Final    Absolute  Monocytes 0.61  0.17 - 1.22 Thousand/µL Final    Eosinophils Absolute 0.01  0.00 - 0.61 Thousand/µL Final    Basophils Absolute 0.02  0.00 - 0.10 Thousands/µL Final   COMPREHENSIVE METABOLIC PANEL - Abnormal    Sodium 136  135 - 147 mmol/L Final    Potassium 3.8  3.5 - 5.3 mmol/L Final    Chloride 100  96 - 108 mmol/L Final    CO2 29  21 - 32 mmol/L Final    ANION GAP 7  4 - 13 mmol/L Final    BUN 15  5 - 25 mg/dL Final    Creatinine 0.64  0.60 - 1.30 mg/dL Final    Comment: Standardized to IDMS reference method    Glucose 92  65 - 140 mg/dL Final    Comment: If the patient is fasting, the ADA then defines impaired fasting glucose as > 100 mg/dL and diabetes as > or equal to 123 mg/dL.    Calcium 9.2  8.4 - 10.2 mg/dL Final    AST 99 (*) 13 - 39 U/L Final    ALT 86 (*) 7 - 52 U/L Final    Comment: Specimen collection should occur prior to Sulfasalazine administration due to the potential for falsely depressed results.     Alkaline Phosphatase 85  34 - 104 U/L Final    Total Protein 7.3  6.4 - 8.4 g/dL Final    Albumin 4.3  3.5 - 5.0 g/dL Final    Total Bilirubin 1.38 (*) 0.20 - 1.00 mg/dL Final    Comment: Use of this assay is not recommended for patients undergoing treatment with eltrombopag due to the potential for falsely elevated results.  N-acetyl-p-benzoquinone imine (metabolite of Acetaminophen) will generate erroneously low results in samples for patients that have taken an overdose of Acetaminophen.    eGFR 113  ml/min/1.73sq m Final    Narrative:     National Kidney Disease Foundation guidelines for Chronic Kidney Disease (CKD):     Stage 1 with normal or high GFR (GFR > 90 mL/min/1.73 square meters)    Stage 2 Mild CKD (GFR = 60-89 mL/min/1.73 square meters)    Stage 3A Moderate CKD (GFR = 45-59 mL/min/1.73 square meters)    Stage 3B Moderate CKD (GFR = 30-44 mL/min/1.73 square meters)    Stage 4 Severe CKD (GFR = 15-29 mL/min/1.73 square meters)    Stage 5 End Stage CKD (GFR <15 mL/min/1.73 square  "meters)  Note: GFR calculation is accurate only with a steady state creatinine   HS TROPONIN I 0HR - Normal    hs TnI 0hr 15  \"Refer to ACS Flowchart\"- see link ng/L Final    Comment:                                              Initial (time 0) result  If >=50 ng/L, Myocardial injury suggested ;  Type of myocardial injury and treatment strategy  to be determined.  If 5-49 ng/L, a delta result at 2 hours and or 4 hours will be needed to further evaluate.  If <4 ng/L, and chest pain has been >3 hours since onset, patient may qualify for discharge based on the HEART score in the ED.  If <5 ng/L and <3hours since onset of chest pain, a delta result at 2 hours will be needed to further evaluate.    HS Troponin 99th Percentile URL of a Health Population=12 ng/L with a 95% Confidence Interval of 8-18 ng/L.    Second Troponin (time 2 hours)  If calculated delta >= 20 ng/L,  Myocardial injury suggested ; Type of myocardial injury and treatment strategy to be determined.  If 5-49 ng/L and the calculated delta is 5-19 ng/L, consult medical service for evaluation.  Continue evaluation for ischemia on ecg and other possible etiology and repeat hs troponin at 4 hours.  If delta is <5 ng/L at 2 hours, consider discharge based on risk stratification via the HEART score (if in ED), or MARCI risk score in IP/Observation.    HS Troponin 99th Percentile URL of a Health Population=12 ng/L with a 95% Confidence Interval of 8-18 ng/L.   TSH, 3RD GENERATION - Normal    TSH 3RD GENERATON 1.176  0.450 - 4.500 uIU/mL Final    Comment: Adult TSH (3rd generation) reference range follows the recommended guidelines of the American Thyroid Association, January, 2020.   HS TROPONIN I 2HR - Normal    hs TnI 2hr 14  \"Refer to ACS Flowchart\"- see link ng/L Final    Comment:                                              Initial (time 0) result  If >=50 ng/L, Myocardial injury suggested ;  Type of myocardial injury and treatment strategy  to be " determined.  If 5-49 ng/L, a delta result at 2 hours and or 4 hours will be needed to further evaluate.  If <4 ng/L, and chest pain has been >3 hours since onset, patient may qualify for discharge based on the HEART score in the ED.  If <5 ng/L and <3hours since onset of chest pain, a delta result at 2 hours will be needed to further evaluate.    HS Troponin 99th Percentile URL of a Health Population=12 ng/L with a 95% Confidence Interval of 8-18 ng/L.    Second Troponin (time 2 hours)  If calculated delta >= 20 ng/L,  Myocardial injury suggested ; Type of myocardial injury and treatment strategy to be determined.  If 5-49 ng/L and the calculated delta is 5-19 ng/L, consult medical service for evaluation.  Continue evaluation for ischemia on ecg and other possible etiology and repeat hs troponin at 4 hours.  If delta is <5 ng/L at 2 hours, consider discharge based on risk stratification via the HEART score (if in ED), or MARCI risk score in IP/Observation.    HS Troponin 99th Percentile URL of a Health Population=12 ng/L with a 95% Confidence Interval of 8-18 ng/L.    Delta 2hr hsTnI -1  <20 ng/L Final   POCT ALCOHOL BREATH TEST - Normal    EXTBreath Alcohol 0.000   Final   UA W REFLEX TO MICROSCOPIC WITH REFLEX TO CULTURE    Color, UA Light Yellow   Final    Clarity, UA Clear   Final    Specific Gravity, UA 1.011  1.003 - 1.030 Final    pH, UA 7.5  4.5, 5.0, 5.5, 6.0, 6.5, 7.0, 7.5, 8.0 Final    Leukocytes, UA Negative  Negative Final    Nitrite, UA Negative  Negative Final    Protein, UA Negative  Negative mg/dl Final    Glucose, UA Negative  Negative mg/dl Final    Ketones, UA Negative  Negative mg/dl Final    Urobilinogen, UA <2.0  <2.0 mg/dl mg/dl Final    Bilirubin, UA Negative  Negative Final    Occult Blood, UA Negative  Negative Final    No orders to display      Medications: Code Status:   Medications   ibuprofen (MOTRIN) tablet 600 mg (600 mg Oral Given 10/1/24 9169)   QUEtiapine (SEROquel) tablet 200 mg  (200 mg Oral Given 10/2/24 0131)   QUEtiapine (SEROquel) tablet 50 mg (50 mg Oral Given 10/2/24 1310)    Code Status: Prior  Advance Directive and Living Will:      Power of :    POLST:       Orders Placed This Encounter   Procedures    HS Troponin 0hr (reflex protocol)    Rapid drug screen, urine    CBC and differential    Comprehensive metabolic panel    TSH    HS Troponin I 2hr    UA w Reflex to Microscopic w Reflex to Culture    Diet Regular; Finger Foods    1:1 Continual Observation    1:1 Continual Observation    Consult to ED crisis worker    POCT alcohol breath test    ECG 12 lead    ECG 12 lead    Discharge Readmit to [facility]     Time reflects when diagnosis was documented in both MDM as applicable and the Disposition within this note       Time User Action Codes Description Comment    10/1/2024  9:50 PM Kandice Vicente Add [E43] Severe protein-calorie malnutrition (HCC)           ED Disposition       ED Disposition   Transfer to Another Facility    Condition   --    Date/Time   Wed Oct 2, 2024 10:07 AM    Comment   Perez Stack Jr. should be transferred out to [facility].               MD Documentation      Flowsheet Row Most Recent Value   Patient Condition The patient has been stabilized such that within reasonable medical probability, no material deterioration of the patient condition or the condition of the unborn child(debbie) is likely to result from the transfer   Reason for Transfer Level of Care needed not available at this facility   Benefits of Transfer Other benefits (Include comment)_______________________  [Inpt MH tx]   Risks of Transfer Potential for delay in receiving treatment   Accepting Physician Dr. Vicente   Accepting Facility Name, City & State  [facility]  [place]    (Name & Tel number) Nova Beard LCSW   Transported by (Company and Unit #) [employer]   Sending MD Dr. Tracey   Provider Certification General risk, such as  traffic hazards, adverse weather conditions, rough terrain or turbulence, possible failure of equipment (including vehicle or aircraft), or consequences of actions of persons outside the control of the transport personnel          RN Documentation      Flowsheet Row Most Recent Value   Accepting Facility Name, City & State  [facility]  [place]    (Name & Tel number) Nova Beard LCSW   Transported by (Company and Unit #) [employer]   Patient Belongings Disposition Sent with patient          Follow-up Information    None       Discharge Medication List as of 10/2/2024 10:08 AM        CONTINUE these medications which have NOT CHANGED    Details   clotrimazole (LOTRIMIN) 1 % cream Apply topically 2 (two) times a day, Starting Mon 9/30/2024, Print      Diclofenac Sodium (VOLTAREN) 1 % Apply 4 g topically 4 (four) times a day, Starting Mon 9/30/2024, Print      famotidine (PEPCID) 20 mg tablet Take 1 tablet (20 mg total) by mouth 2 (two) times a day, Starting Mon 9/30/2024, Until Wed 10/30/2024, Print      FLUoxetine (PROzac) 40 MG capsule Take 1 capsule (40 mg total) by mouth daily, Starting Mon 9/30/2024, Until Wed 10/30/2024, Print      !! QUEtiapine (SEROquel) 200 mg tablet Take 1 tablet (200 mg total) by mouth daily at bedtime, Starting Mon 9/30/2024, Until Wed 10/30/2024, Print      !! QUEtiapine (SEROquel) 50 mg tablet Take 1 tablet (50 mg total) by mouth 2 (two) times a day, Starting Mon 9/30/2024, Until Wed 10/30/2024, Print       !! - Potential duplicate medications found. Please discuss with provider.        STOP taking these medications       buprenorphine-naloxone (Suboxone) 8-2 mg Comments:   Reason for Stopping:             No discharge procedures on file.  Prior to Admission Medications   Prescriptions Last Dose Informant Patient Reported? Taking?   Diclofenac Sodium (VOLTAREN) 1 %   No No   Sig: Apply 4 g topically 4 (four) times a day   FLUoxetine (PROzac) 40 MG capsule   No No   Sig:  "Take 1 capsule (40 mg total) by mouth daily   QUEtiapine (SEROquel) 200 mg tablet   No No   Sig: Take 1 tablet (200 mg total) by mouth daily at bedtime   QUEtiapine (SEROquel) 50 mg tablet   No No   Sig: Take 1 tablet (50 mg total) by mouth 2 (two) times a day   buprenorphine-naloxone (Suboxone) 8-2 mg   No No   Sig: Place 1 Film (8 mg total) under the tongue 2 (two) times a day for 1 day   clotrimazole (LOTRIMIN) 1 % cream   No No   Sig: Apply topically 2 (two) times a day   famotidine (PEPCID) 20 mg tablet   No No   Sig: Take 1 tablet (20 mg total) by mouth 2 (two) times a day      Facility-Administered Medications: None                        Portions of the record may have been created with voice recognition software. Occasional wrong word or \"sound a like\" substitutions may have occurred due to the inherent limitations of voice recognition software. Read the chart carefully and recognize, using context, where substitutions have occurred.    Electronically signed by:  Law Tracey  "

## 2024-10-01 NOTE — ED NOTES
Per EVS, patient's MA coverage is fee for service. Patient denies active insurance and reports completing MA application during recent admission.     Nova Beard LCSW  10/01/24    [id number]

## 2024-10-01 NOTE — ED NOTES
"Pt is a 53 y.o. male who was brought to the ED with Patient presents to ED due to hopelessness and suicidal ideations. Patient states that he was discharged from [facility] yesterday and felt that he was not ready but was just 'pushed out'. Patient states that he spent the entire night just wandering the streets trying to end it, and doesn't even know how he got to [place] from [place]. Patient reports significant hopelessness and feeling lonely because he doesn't have any supports or family. Patient reports confusion and gaps in time causing him to not accurately remember things. Patient states when he is angry or very upset, he \"blacks out\" and cannot recall the specifics of the event.He believes he has family that lives around and last night tried to find them, but couldn't remember where their houses were or if they still live there. Patient expressed a history of depression, but feels that last night was the worst feeling he's ever had. A friend had seen him wandering, so picked him up and suggested he come back to the hospital for help. Patient reports suicidal ideations all through the night and kept thinking of various plans to kill himself. While walking across the bridge he thought about jumping into traffic, but got high instead to distract him from those thoughts. Patient has a history of suicidal ideations and attempts via overdose and cutting. Patient denies homicidal ideations. He reports visual hallucinations of shadows, but denies any auditory hallucinations.     Patient reports multiple inpatient admissions in the past, both for mental health and substance use. He was most recently admitted to [facility] and discharged yesterday, but couldn't remember how long he was there. He expressed feeling worthless because of the way he was discharged, with just some papers and no medications- particularly suboxone. He would like to go to rehab following admission, but realizes his lack of insurance is a barrier. " Patient is not active with outpatient providers because of being uninsured. He states he has been buying suboxone off the street for an extended period of time. Patient reports history of opiate abuse, but was clean from opiates for about 7 years. Recently he has been using crack and meth. Last night he went to buy crack, but could tell immediately that it was dope, which was upsetting because he hadn't used in so long. Patient states he did not sleep at all last night, and although he ate while in the ED, he felt he had to force it. Patient continues to feel unsafe outside of the hospital and feels he needs additional support and treatment. Patient signed a 201 and prefers not to return to [facility]. Patient was informed and understood that because he is uninsured, there is a barrier in bed placement. He was informed that Q would not be considered tonight, but may have to reconsider tomorrow if unable to find placement elsewhere.    Chief Complaint   Patient presents with    Suicidal     Pt d/c from inpatient [facility] yesterday.  Pt stated since being discharged he wants to hurt himself.  Pt used meth and cocaine last night and stated he would try to harm himself any way he can.      Intake Assessment completed, Safety risk Assessment completed    Nova Beard LCSW  10/01/24    [id number]

## 2024-10-01 NOTE — ED NOTES
201 faxed to -[facility] for review.     Nova Beard, KHANH  10/01/24     [id number]

## 2024-10-02 ENCOUNTER — HOSPITAL ENCOUNTER (INPATIENT)
Facility: HOSPITAL | Age: 54
LOS: 5 days | Discharge: OTHER INSTITUTION | DRG: 861 | End: 2024-10-07
Attending: STUDENT IN AN ORGANIZED HEALTH CARE EDUCATION/TRAINING PROGRAM | Admitting: PSYCHIATRY & NEUROLOGY
Payer: COMMERCIAL

## 2024-10-02 DIAGNOSIS — F31.9 BIPOLAR DISORDER (HCC): Primary | ICD-10-CM

## 2024-10-02 DIAGNOSIS — F15.20: ICD-10-CM

## 2024-10-02 DIAGNOSIS — F39 MOOD DISORDER (HCC): ICD-10-CM

## 2024-10-02 DIAGNOSIS — E43 SEVERE PROTEIN-CALORIE MALNUTRITION (HCC): ICD-10-CM

## 2024-10-02 LAB
25(OH)D3 SERPL-MCNC: 24 NG/ML (ref 30–100)
ALBUMIN SERPL BCG-MCNC: 3.9 G/DL (ref 3.5–5)
ALP SERPL-CCNC: 80 U/L (ref 34–104)
ALT SERPL W P-5'-P-CCNC: 66 U/L (ref 7–52)
ANION GAP SERPL CALCULATED.3IONS-SCNC: 5 MMOL/L (ref 4–13)
AST SERPL W P-5'-P-CCNC: 67 U/L (ref 13–39)
ATRIAL RATE: 68 BPM
BASOPHILS # BLD AUTO: 0.02 THOUSANDS/ΜL (ref 0–0.1)
BASOPHILS NFR BLD AUTO: 1 % (ref 0–1)
BILIRUB SERPL-MCNC: 0.98 MG/DL (ref 0.2–1)
BUN SERPL-MCNC: 18 MG/DL (ref 5–25)
CALCIUM SERPL-MCNC: 9.2 MG/DL (ref 8.4–10.2)
CHLORIDE SERPL-SCNC: 103 MMOL/L (ref 96–108)
CHOLEST SERPL-MCNC: 167 MG/DL
CO2 SERPL-SCNC: 33 MMOL/L (ref 21–32)
CREAT SERPL-MCNC: 0.73 MG/DL (ref 0.6–1.3)
EOSINOPHIL # BLD AUTO: 0.11 THOUSAND/ΜL (ref 0–0.61)
EOSINOPHIL NFR BLD AUTO: 3 % (ref 0–6)
ERYTHROCYTE [DISTWIDTH] IN BLOOD BY AUTOMATED COUNT: 15.5 % (ref 11.6–15.1)
FOLATE SERPL-MCNC: 21 NG/ML
GFR SERPL CREATININE-BSD FRML MDRD: 107 ML/MIN/1.73SQ M
GLUCOSE SERPL-MCNC: 98 MG/DL (ref 65–140)
HCT VFR BLD AUTO: 35.7 % (ref 36.5–49.3)
HDLC SERPL-MCNC: 70 MG/DL
HGB BLD-MCNC: 12.4 G/DL (ref 12–17)
IMM GRANULOCYTES # BLD AUTO: 0.01 THOUSAND/UL (ref 0–0.2)
IMM GRANULOCYTES NFR BLD AUTO: 0 % (ref 0–2)
LDLC SERPL CALC-MCNC: 74 MG/DL (ref 0–100)
LYMPHOCYTES # BLD AUTO: 1.02 THOUSANDS/ΜL (ref 0.6–4.47)
LYMPHOCYTES NFR BLD AUTO: 26 % (ref 14–44)
MCH RBC QN AUTO: 32.2 PG (ref 26.8–34.3)
MCHC RBC AUTO-ENTMCNC: 34.7 G/DL (ref 31.4–37.4)
MCV RBC AUTO: 93 FL (ref 82–98)
MONOCYTES # BLD AUTO: 0.41 THOUSAND/ΜL (ref 0.17–1.22)
MONOCYTES NFR BLD AUTO: 10 % (ref 4–12)
NEUTROPHILS # BLD AUTO: 2.38 THOUSANDS/ΜL (ref 1.85–7.62)
NEUTS SEG NFR BLD AUTO: 60 % (ref 43–75)
NONHDLC SERPL-MCNC: 97 MG/DL
NRBC BLD AUTO-RTO: 0 /100 WBCS
P AXIS: 63 DEGREES
PLATELET # BLD AUTO: 269 THOUSANDS/UL (ref 149–390)
PMV BLD AUTO: 10 FL (ref 8.9–12.7)
POTASSIUM SERPL-SCNC: 3.9 MMOL/L (ref 3.5–5.3)
PR INTERVAL: 152 MS
PROT SERPL-MCNC: 6.8 G/DL (ref 6.4–8.4)
QRS AXIS: 9 DEGREES
QRSD INTERVAL: 90 MS
QT INTERVAL: 402 MS
QTC INTERVAL: 427 MS
RBC # BLD AUTO: 3.85 MILLION/UL (ref 3.88–5.62)
SODIUM SERPL-SCNC: 141 MMOL/L (ref 135–147)
T WAVE AXIS: 20 DEGREES
TREPONEMA PALLIDUM IGG+IGM AB [PRESENCE] IN SERUM OR PLASMA BY IMMUNOASSAY: NORMAL
TRIGL SERPL-MCNC: 115 MG/DL
VENTRICULAR RATE: 68 BPM
VIT B12 SERPL-MCNC: 231 PG/ML (ref 180–914)
WBC # BLD AUTO: 3.95 THOUSAND/UL (ref 4.31–10.16)

## 2024-10-02 PROCEDURE — 86780 TREPONEMA PALLIDUM: CPT | Performed by: PSYCHIATRY & NEUROLOGY

## 2024-10-02 PROCEDURE — 82746 ASSAY OF FOLIC ACID SERUM: CPT | Performed by: PSYCHIATRY & NEUROLOGY

## 2024-10-02 PROCEDURE — 93010 ELECTROCARDIOGRAM REPORT: CPT | Performed by: STUDENT IN AN ORGANIZED HEALTH CARE EDUCATION/TRAINING PROGRAM

## 2024-10-02 PROCEDURE — 85025 COMPLETE CBC W/AUTO DIFF WBC: CPT | Performed by: PSYCHIATRY & NEUROLOGY

## 2024-10-02 PROCEDURE — 82607 VITAMIN B-12: CPT | Performed by: PSYCHIATRY & NEUROLOGY

## 2024-10-02 PROCEDURE — 80053 COMPREHEN METABOLIC PANEL: CPT | Performed by: PSYCHIATRY & NEUROLOGY

## 2024-10-02 PROCEDURE — 80061 LIPID PANEL: CPT | Performed by: PSYCHIATRY & NEUROLOGY

## 2024-10-02 PROCEDURE — 82306 VITAMIN D 25 HYDROXY: CPT | Performed by: PSYCHIATRY & NEUROLOGY

## 2024-10-02 PROCEDURE — 93005 ELECTROCARDIOGRAM TRACING: CPT

## 2024-10-02 RX ORDER — PROPRANOLOL HCL 10 MG
5 TABLET ORAL EVERY 8 HOURS PRN
Status: DISCONTINUED | OUTPATIENT
Start: 2024-10-02 | End: 2024-10-07 | Stop reason: HOSPADM

## 2024-10-02 RX ORDER — ONDANSETRON 4 MG/1
4 TABLET, ORALLY DISINTEGRATING ORAL EVERY 8 HOURS PRN
Status: DISCONTINUED | OUTPATIENT
Start: 2024-10-02 | End: 2024-10-07 | Stop reason: HOSPADM

## 2024-10-02 RX ORDER — PANTOPRAZOLE SODIUM 40 MG/1
40 TABLET, DELAYED RELEASE ORAL
Status: DISCONTINUED | OUTPATIENT
Start: 2024-10-03 | End: 2024-10-06

## 2024-10-02 RX ORDER — HALOPERIDOL 5 MG/ML
5 INJECTION INTRAMUSCULAR
Status: DISCONTINUED | OUTPATIENT
Start: 2024-10-02 | End: 2024-10-07 | Stop reason: HOSPADM

## 2024-10-02 RX ORDER — MAGNESIUM HYDROXIDE/ALUMINUM HYDROXICE/SIMETHICONE 120; 1200; 1200 MG/30ML; MG/30ML; MG/30ML
30 SUSPENSION ORAL EVERY 4 HOURS PRN
Status: DISCONTINUED | OUTPATIENT
Start: 2024-10-02 | End: 2024-10-07 | Stop reason: HOSPADM

## 2024-10-02 RX ORDER — RISPERIDONE 0.5 MG/1
0.5 TABLET ORAL
Status: DISCONTINUED | OUTPATIENT
Start: 2024-10-02 | End: 2024-10-07 | Stop reason: HOSPADM

## 2024-10-02 RX ORDER — IBUPROFEN 600 MG/1
600 TABLET, FILM COATED ORAL EVERY 8 HOURS PRN
Status: DISCONTINUED | OUTPATIENT
Start: 2024-10-02 | End: 2024-10-07 | Stop reason: HOSPADM

## 2024-10-02 RX ORDER — FAMOTIDINE 20 MG/1
20 TABLET, FILM COATED ORAL 2 TIMES DAILY PRN
Status: DISCONTINUED | OUTPATIENT
Start: 2024-10-02 | End: 2024-10-07 | Stop reason: HOSPADM

## 2024-10-02 RX ORDER — RISPERIDONE 1 MG/1
1 TABLET ORAL
Status: DISCONTINUED | OUTPATIENT
Start: 2024-10-02 | End: 2024-10-07 | Stop reason: HOSPADM

## 2024-10-02 RX ORDER — HYDROXYZINE HYDROCHLORIDE 25 MG/1
25 TABLET, FILM COATED ORAL
Status: DISCONTINUED | OUTPATIENT
Start: 2024-10-02 | End: 2024-10-07 | Stop reason: HOSPADM

## 2024-10-02 RX ORDER — RISPERIDONE 0.25 MG/1
0.25 TABLET ORAL
Status: DISCONTINUED | OUTPATIENT
Start: 2024-10-02 | End: 2024-10-07 | Stop reason: HOSPADM

## 2024-10-02 RX ORDER — IBUPROFEN 400 MG/1
200 TABLET, FILM COATED ORAL EVERY 6 HOURS PRN
Status: DISCONTINUED | OUTPATIENT
Start: 2024-10-02 | End: 2024-10-07 | Stop reason: HOSPADM

## 2024-10-02 RX ORDER — LANOLIN ALCOHOL/MO/W.PET/CERES
3 CREAM (GRAM) TOPICAL
Status: DISCONTINUED | OUTPATIENT
Start: 2024-10-02 | End: 2024-10-07 | Stop reason: HOSPADM

## 2024-10-02 RX ORDER — QUETIAPINE FUMARATE 25 MG/1
50 TABLET, FILM COATED ORAL ONCE
Status: COMPLETED | OUTPATIENT
Start: 2024-10-02 | End: 2024-10-02

## 2024-10-02 RX ORDER — POLYETHYLENE GLYCOL 3350 17 G/17G
17 POWDER, FOR SOLUTION ORAL DAILY
Status: DISCONTINUED | OUTPATIENT
Start: 2024-10-03 | End: 2024-10-06

## 2024-10-02 RX ORDER — IBUPROFEN 400 MG/1
400 TABLET, FILM COATED ORAL EVERY 6 HOURS PRN
Status: DISCONTINUED | OUTPATIENT
Start: 2024-10-02 | End: 2024-10-07 | Stop reason: HOSPADM

## 2024-10-02 RX ORDER — QUETIAPINE FUMARATE 100 MG/1
200 TABLET, FILM COATED ORAL
Status: DISCONTINUED | OUTPATIENT
Start: 2024-10-02 | End: 2024-10-07 | Stop reason: HOSPADM

## 2024-10-02 RX ORDER — TRAZODONE HYDROCHLORIDE 50 MG/1
50 TABLET, FILM COATED ORAL
Status: DISCONTINUED | OUTPATIENT
Start: 2024-10-02 | End: 2024-10-07 | Stop reason: HOSPADM

## 2024-10-02 RX ORDER — QUETIAPINE FUMARATE 100 MG/1
200 TABLET, FILM COATED ORAL ONCE
Status: COMPLETED | OUTPATIENT
Start: 2024-10-02 | End: 2024-10-02

## 2024-10-02 RX ORDER — SENNOSIDES 8.6 MG
2 TABLET ORAL
Status: DISCONTINUED | OUTPATIENT
Start: 2024-10-02 | End: 2024-10-07 | Stop reason: HOSPADM

## 2024-10-02 RX ORDER — BUPRENORPHINE AND NALOXONE 8; 2 MG/1; MG/1
8 FILM, SOLUBLE BUCCAL; SUBLINGUAL 2 TIMES DAILY
Status: DISCONTINUED | OUTPATIENT
Start: 2024-10-02 | End: 2024-10-03

## 2024-10-02 RX ORDER — HYDROXYZINE HYDROCHLORIDE 50 MG/1
50 TABLET, FILM COATED ORAL
Status: DISCONTINUED | OUTPATIENT
Start: 2024-10-02 | End: 2024-10-07 | Stop reason: HOSPADM

## 2024-10-02 RX ADMIN — SENNOSIDES 17.2 MG: 8.6 TABLET, FILM COATED ORAL at 21:22

## 2024-10-02 RX ADMIN — BUPRENORPHINE AND NALOXONE 8 MG: 8; 2 FILM, SOLUBLE BUCCAL; SUBLINGUAL at 20:34

## 2024-10-02 RX ADMIN — QUETIAPINE FUMARATE 200 MG: 100 TABLET ORAL at 21:22

## 2024-10-02 RX ADMIN — QUETIAPINE FUMARATE 50 MG: 25 TABLET ORAL at 08:31

## 2024-10-02 RX ADMIN — BUPRENORPHINE AND NALOXONE 8 MG: 8; 2 FILM BUCCAL; SUBLINGUAL at 08:30

## 2024-10-02 RX ADMIN — FLUOXETINE HYDROCHLORIDE 40 MG: 20 CAPSULE ORAL at 08:30

## 2024-10-02 RX ADMIN — MELATONIN TAB 3 MG 3 MG: 3 TAB at 21:22

## 2024-10-02 RX ADMIN — QUETIAPINE FUMARATE 200 MG: 100 TABLET ORAL at 01:31

## 2024-10-02 NOTE — ASSESSMENT & PLAN NOTE
"Per chart review, patient has a history of hepatitis C.  Unclear if this has been treated, per chart review, patient is \"status post failed treatment \"  AST and ALT are mildly elevated but improved from prior  Patient to follow-up with gastroenterology after discharge  Will place an ambulatory referral to GI  "

## 2024-10-02 NOTE — ED NOTES
Call received from [place] [facility] stating they are unable to consider due to not accepting out of [facility] tiffany patients.     Nova Beard LCSW  10/01/24    [phone]

## 2024-10-02 NOTE — NURSING NOTE
Perez is a new admission to unit today. He is resting in bed.Pleasant however guarded. No  needs  voiced at present.

## 2024-10-02 NOTE — ASSESSMENT & PLAN NOTE
Malnutrition Findings:                                 BMI Findings:           Body mass index is 21.97 kg/m².   Add Ensure twice daily based on recent dietitians recommendations

## 2024-10-02 NOTE — PLAN OF CARE
Problem: Alteration in Thoughts and Perception  Goal: Verbalize thoughts and feelings  Description: Interventions:  - Promote a nonjudgmental and trusting relationship with the patient through active listening and therapeutic communication  - Assess patient's level of functioning, behavior and potential for risk  - Engage patient in 1 on 1 interactions  - Encourage patient to express fears, feelings, frustrations, and discuss symptoms    - [place] patient to reality, help patient recognize reality-based thinking   - Administer medications as ordered and assess for potential side effects  - Provide the patient education related to the signs and symptoms of the illness and desired effects of prescribed medications  Outcome: Not Progressing     Problem: Ineffective Coping  Goal: Identifies ineffective coping skills  Outcome: Not Progressing  Goal: Identifies healthy coping skills  Outcome: Not Progressing  Goal: Demonstrates healthy coping skills  Outcome: Not Progressing     Problem: Risk for Self Injury/Neglect  Goal: Refrain from harming self  Description: Interventions:  - Monitor patient closely, per order  - Develop a trusting relationship  - Supervise medication ingestion, monitor effects and side effects   Outcome: Not Progressing     Problem: Depression  Goal: Refrain from isolation  Description: Interventions:  - Develop a trusting relationship   - Encourage socialization   Outcome: Not Progressing  Goal: Refrain from self-neglect  Outcome: Not Progressing     Problem: Anxiety  Goal: Anxiety is at manageable level  Description: Interventions:  - Assess and monitor patient's anxiety level.   - Monitor for signs and symptoms (heart palpitations, chest pain, shortness of breath, headaches, nausea, feeling jumpy, restlessness, irritable, apprehensive).   - Collaborate with interdisciplinary team and initiate plan and interventions as ordered.  - [place] patient to unit/surroundings  - Explain treatment plan  -  Encourage participation in care  - Encourage verbalization of concerns/fears  - Identify coping mechanisms  - Assist in developing anxiety-reducing skills  - Administer/offer alternative therapies  - Limit or eliminate stimulants  Outcome: Not Progressing     Problem: PAIN - ADULT  Goal: Verbalizes/displays adequate comfort level or baseline comfort level  Description: Interventions:  - Encourage patient to monitor pain and request assistance  - Assess pain using appropriate pain scale  - Administer analgesics based on type and severity of pain and evaluate response  - Implement non-pharmacological measures as appropriate and evaluate response  - Consider cultural and social influences on pain and pain management  - Notify physician/advanced practitioner if interventions unsuccessful or patient reports new pain  Outcome: Not Progressing
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
## 2024-10-02 NOTE — ASSESSMENT & PLAN NOTE
UDS positive for opiates and fentanyl  Continue Suboxone 8 mg twice daily  Encouraged absolute abstinence

## 2024-10-02 NOTE — ED NOTES
Patient is accepted at [facility] 6B.  Patient is accepted by Dr. Cinthia Cordero in Intake.     Transportation is arranged with [facility].     Transportation is scheduled for 10a with [facility].   Patient may go to the floor after 10a.          *Nurse report is to be called to [phone] prior to patient transfer.*        Nova Beard LCSW  10/01/24    [id number]

## 2024-10-02 NOTE — ED NOTES
Only available in-network bed is at [facility]. MA Esteban bed search initiated to Following Facilities:    [place]: Spoke with Ayleen, no beds available.   [place]: Possible bed available; chart faxed for review.   [place]: Spoke with Malka, no beds available.   [place]: Spoke with Stacey, no beds available.   [facility]: No beds available.   [facility]: Beds available; chart faxed for review.   [facility]: No answer in admissions.     Nova Beard LCSW  10/01/24    2015

## 2024-10-02 NOTE — ASSESSMENT & PLAN NOTE
Admission labs: CBC, CMP, lipid panel, TSH acceptable   Vitals stable   UA unremarkable   UDS positive for fentanyl and cocaine  EKG reveals NSR, 68 bpm    Patient is medically cleared for admission to U and treatment of underlying psychiatric illness based on available results  Please contact [facility] with any questions or concerns

## 2024-10-03 ENCOUNTER — APPOINTMENT (INPATIENT)
Dept: RADIOLOGY | Facility: HOSPITAL | Age: 54
DRG: 861 | End: 2024-10-03
Payer: COMMERCIAL

## 2024-10-03 PROCEDURE — 99223 1ST HOSP IP/OBS HIGH 75: CPT | Performed by: PSYCHIATRY & NEUROLOGY

## 2024-10-03 PROCEDURE — 99253 IP/OBS CNSLTJ NEW/EST LOW 45: CPT | Performed by: NURSE PRACTITIONER

## 2024-10-03 PROCEDURE — 73610 X-RAY EXAM OF ANKLE: CPT

## 2024-10-03 RX ORDER — METHOCARBAMOL 500 MG/1
500 TABLET, FILM COATED ORAL EVERY 8 HOURS PRN
Status: DISCONTINUED | OUTPATIENT
Start: 2024-10-03 | End: 2024-10-07 | Stop reason: HOSPADM

## 2024-10-03 RX ORDER — BUPRENORPHINE AND NALOXONE 8; 2 MG/1; MG/1
8 FILM, SOLUBLE BUCCAL; SUBLINGUAL DAILY
Status: DISCONTINUED | OUTPATIENT
Start: 2024-10-04 | End: 2024-10-07 | Stop reason: HOSPADM

## 2024-10-03 RX ORDER — BUPRENORPHINE AND NALOXONE 8; 2 MG/1; MG/1
8 FILM, SOLUBLE BUCCAL; SUBLINGUAL
Status: DISCONTINUED | OUTPATIENT
Start: 2024-10-03 | End: 2024-10-07 | Stop reason: HOSPADM

## 2024-10-03 RX ORDER — QUETIAPINE FUMARATE 50 MG/1
50 TABLET, FILM COATED ORAL 2 TIMES DAILY
Status: DISCONTINUED | OUTPATIENT
Start: 2024-10-03 | End: 2024-10-07 | Stop reason: HOSPADM

## 2024-10-03 RX ADMIN — SENNOSIDES 17.2 MG: 8.6 TABLET, FILM COATED ORAL at 21:10

## 2024-10-03 RX ADMIN — POLYETHYLENE GLYCOL 3350 17 G: 17 POWDER, FOR SOLUTION ORAL at 08:27

## 2024-10-03 RX ADMIN — CYANOCOBALAMIN TAB 1000 MCG 1000 MCG: 1000 TAB at 08:27

## 2024-10-03 RX ADMIN — Medication 2000 UNITS: at 08:27

## 2024-10-03 RX ADMIN — QUETIAPINE FUMARATE 50 MG: 50 TABLET ORAL at 12:25

## 2024-10-03 RX ADMIN — QUETIAPINE FUMARATE 50 MG: 50 TABLET ORAL at 17:15

## 2024-10-03 RX ADMIN — FLUOXETINE HYDROCHLORIDE 40 MG: 20 CAPSULE ORAL at 12:25

## 2024-10-03 RX ADMIN — QUETIAPINE FUMARATE 200 MG: 100 TABLET ORAL at 21:10

## 2024-10-03 RX ADMIN — MELATONIN TAB 3 MG 3 MG: 3 TAB at 21:10

## 2024-10-03 RX ADMIN — BUPRENORPHINE AND NALOXONE 8 MG: 8; 2 FILM, SOLUBLE BUCCAL; SUBLINGUAL at 08:27

## 2024-10-03 RX ADMIN — BUPRENORPHINE AND NALOXONE 8 MG: 8; 2 FILM BUCCAL; SUBLINGUAL at 17:15

## 2024-10-03 NOTE — ASSESSMENT & PLAN NOTE
Malnutrition Findings:       -as per [facility]                        BMI Findings:           Body mass index is 21.97 kg/m².

## 2024-10-03 NOTE — CMS CERTIFICATION NOTE
Certification: Based upon physical, mental and social evaluations, I certify that inpatient psychiatric services continue to be medically necessary for this patient for a duration of 10 midnights for the treatment of  Bipolar disorder (HCC) Available alternative community resources still do not meet the patient's mental health care needs. I further attest that an established written individualized plan of care has been updated and is outlined in the patient's medical records.

## 2024-10-03 NOTE — NURSING NOTE
Chel Seroquel order obtained for Perez.  Room has been changed to 649.  Denies psychiatric symptoms. Safety checks maintained.

## 2024-10-03 NOTE — TREATMENT PLAN
TREATMENT PLAN REVIEW - Behavioral Health Perez Stack Jr. 53 y.o. 1970 male MRN: [id number]    [facility] 6B [facility] Room / Bed: [facility] 649/[facility] 649-01 Encounter: [id number]          Admit Date/Time:  10/2/2024 10:37 AM    Treatment Team:   MD Radha Howard CRNP Milaijah Torres Christina Rivera, RN Jacqueline Almonte Kiersten Bealer Michael Micek, RN Stacie Searle [facility]    Diagnosis: Principal Problem:    Bipolar disorder (HCC)  Active Problems:    Hepatitis C virus infection    Gastroesophageal reflux disease    Chronic hepatitis B virus infection (HCC)    Medical clearance for psychiatric admission    Opioid use disorder in remission    Severe protein-calorie malnutrition (HCC)      Patient Strengths/Assets: capable of independent living, resourceful    Patient Barriers/Limitations: lack of financial means, limited support system, poor support system    Short Term Goals: decrease in depressive symptoms, improvement in insight    Long Term Goals: improvement in depression, improved insight    Progress Towards Goals: starting psychiatric medications as prescribed    Recommended Treatment: medication management, patient medication education, group therapy, milieu therapy, continued Behavioral Health psychiatric evaluation/assessment process    Treatment Frequency: daily medication monitoring, group and milieu therapy daily, monitoring through interdisciplinary rounds, monitoring through weekly patient care conferences    Expected Discharge Date:  7 -10 days    Discharge Plan: referrals as indicated    Treatment Plan Created/Updated By: Tony Parham MD
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
## 2024-10-03 NOTE — H&P
Psychiatric Evaluation - Behavioral Health   Perez Stack Jr. 53 y.o. male MRN: [id number]  Unit/Bed#: [facility] 649-01 Encounter: [id number]  Hospital Day: 1    Assessment & Plan   Active Problems:    Bipolar disorder (HCC)    Hepatitis C virus infection    Gastroesophageal reflux disease    Chronic hepatitis B virus infection (HCC)    Medical clearance for psychiatric admission    Opioid use disorder in remission    Severe protein-calorie malnutrition (HCC)    Patient is a 53-year-old male with past medical history of hep C, GERD, chronic hep B and OUD as well as past psychiatric history of bipolar disorder.  Patient self presented to outside hospital ED with thoughts of wanting to harm himself and signed a 201.  He was admitted to [facility] for further stabilization.    At this time, appears the patient is acutely depressed.  He is amenable with continuing his medications namely the Prozac and the Seroquel that he was taking when he was at [place] and suboxone for his OUD.  It appears that patient's main stressors continue to be psychosocial in nature which caused him to relapse on substances after discharge.  Likely his confusion and disorientation that was reported in the crisis worker note was secondary to substances.  Patient does not appear to be acutely delirious or otherwise cognitively altered at this time.  We will attempt to reconfirm the services that [facility] had set up for him and see if there are any other follow-up services we can provide in terms of medication management when he gets discharged.  Given the concerns of [facility] emphasized with patient the importance of compliance with unit rules and regulations.  Will observe patient's behaviors on his medications and I do not expect a protracted length of stay.    Plan:   Assessment & Plan  Bipolar disorder (HCC)  -resume prozac 40 mg QD  -resume seroquel 200 HS and 50 BID  Opioid use disorder in remission  -suboxone 8 mg film QD  "and   Hepatitis C virus infection  -as per SLIM  Gastroesophageal reflux disease  -as per SLIM  Chronic hepatitis B virus infection (HCC)  -as per [facility]  Medical clearance for psychiatric admission  -as per SLIM  Severe protein-calorie malnutrition (HCC)  Malnutrition Findings:       -as per SLIM                        BMI Findings:           Body mass index is 21.97 kg/m².        Admit to [facility] inpatient psychiatry.  Medical management per SLIM.  Check admission labs: CMP, CBC, TSH, RPR, UDS, Lipid Panel, A1c.  Collaborate with case management for baseline assessment and disposition planning.  Chart reviewed and case discussed with treatment team.    Treatment options and alternatives were reviewed with the patient, who concurs with the above plan. Risks, benefits, and possible side effects of medications were explained to the patient, and he verbalizes understanding.      -----------------------------------    Chief Complaint: \"They pushed me out\"    History of Present Illness     Per Crisis worker note:  Nova Beard 10/1/24    Pt is a 53 y.o. male who was brought to the ED with Patient presents to ED due to hopelessness and suicidal ideations. Patient states that he was discharged from [facility] yesterday and felt that he was not ready but was just 'pushed out'. Patient states that he spent the entire night just wandering the streets trying to end it, and doesn't even know how he got to [place] from [place]. Patient reports significant hopelessness and feeling lonely because he doesn't have any supports or family. Patient reports confusion and gaps in time causing him to not accurately remember things. Patient states when he is angry or very upset, he \"blacks out\" and cannot recall the specifics of the event.He believes he has family that lives around and last night tried to find them, but couldn't remember where their houses were or if they still live there. Patient expressed a " history of depression, but feels that last night was the worst feeling he's ever had. A friend had seen him wandering, so picked him up and suggested he come back to the hospital for help. Patient reports suicidal ideations all through the night and kept thinking of various plans to kill himself. While walking across the bridge he thought about jumping into traffic, but got high instead to distract him from those thoughts. Patient has a history of suicidal ideations and attempts via overdose and cutting. Patient denies homicidal ideations. He reports visual hallucinations of shadows, but denies any auditory hallucinations.      Patient reports multiple inpatient admissions in the past, both for mental health and substance use. He was most recently admitted to [facility] and discharged yesterday, but couldn't remember how long he was there. He expressed feeling worthless because of the way he was discharged, with just some papers and no medications- particularly suboxone. He would like to go to rehab following admission, but realizes his lack of insurance is a barrier. Patient is not active with outpatient providers because of being uninsured. He states he has been buying suboxone off the street for an extended period of time. Patient reports history of opiate abuse, but was clean from opiates for about 7 years. Recently he has been using crack and meth. Last night he went to buy crack, but could tell immediately that it was dope, which was upsetting because he hadn't used in so long. Patient states he did not sleep at all last night, and although he ate while in the ED, he felt he had to force it. Patient continues to feel unsafe outside of the hospital and feels he needs additional support and treatment. Patient signed a 201 and prefers not to return to [facility]. Patient was informed and understood that because he is uninsured, there is a barrier in bed placement. He was informed that [facility] would not be considered tonight, but  "may have to reconsider tomorrow if unable to find placement elsewhere.    On admission to Inpatient Psychiatric Unit:  Patient is a 53-year-old male with past medical history of hep C, GERD, chronic hep B and OUD as well as past psychiatric history of bipolar disorder.  Patient self presented to outside hospital ED with thoughts of wanting to harm himself and signed a 201.  He was admitted to [facility] for further stabilization.    On exam, patient corroborates much of the information that was documented above by the crisis worker.  Patient notes that he feels that he was discharged prematurely from [facility] and notes that he was still feeling depressed at that time.  States that he believes that \"I think I disrespected someone and then I was out the next day\".  He states that since being discharged [facility] and presenting to the ED, he used cocaine that was laced with fentanyl.  Patient reports being very upset that fentanyl was in his urine secondary to him being sober per his report for several years using only Suboxone as MOUD.  He denies any other drug including alcohol use other than occasional cigarettes.    Patient continues to report at this time predominant feelings of depression including decreased motivation, poor sleep and appetite as well as weight loss and passive SI, denying any intent or plan at this time, in the context of several psychosocial stressors.  Patient does not endorse any psychotic symptoms at this time although he does state that he has heard voices and seeing shadows before however and is unclear whether this was in the context of active substance use or not.     Discharge summary from [facility] was reviewed.  Per documentation it appears that there is concern that patient has underlying cluster B traits and was purportedly engaging in staff splitting behavior and having disregard for unit rules.  Emphasized with the patient the importance of complying with unit " "policies and rules/regulations while he is here.    Psychiatric Review Of Systems:  Medication side effects: none  Sleep: decreased  Appetite: decreased  Hygiene: able to tend to instrumental and basic ADLs  Anxiety Symptoms: denies  Psychotic Symptoms: denies  Depression Symptoms: endorses  Manic Symptoms: denies  PTSD Symptoms: denies  Suicidal Thoughts: endorses passive SI  Homicidal Thoughts: denies    Medical Review Of Systems:   Patient denies headache or dizziness.   Patient denies chest pain or palpitations.  Patient denies difficulty breathing or wheezing.  Patient denies nausea, vomiting, or diarrhea.  Patient denies polyuria or polydipsia.  Patient denies weakness or numbness.  Pertinent positives as per HPI.    Historical Information     Psychiatric History:   Diagnoses: Bipolar, PTSD, anxiety  Inpatient Hx: Yes, most recent at [facility] w/ d/c on 9/30  Outpatient Hx: none currently  Medications/Trials: seroquel, prozac, suboxone  Suicidal Hx: multiple prior OD attempts  Self Harm: cutting    Substance Abuse History:  Social History     Substance and Sexual Activity   Alcohol Use No     Social History     Substance and Sexual Activity   Drug Use Yes    Types: Cocaine, \"Crack\" cocaine, Methamphetamines, Heroin    Comment: UDS +Cocaine       I spent time with Perez in counseling and education on risk of substance abuse. I assessed motivation and encouraged him for treatment as appropriate.     Family History:   Family History   Problem Relation Age of Onset    No Known Problems Mother     No Known Problems Father     No Known Problems Sister     No Known Problems Brother     No Known Problems Maternal Aunt     No Known Problems Paternal Aunt     No Known Problems Maternal Uncle     No Known Problems Paternal Uncle     No Known Problems Maternal Grandfather     No Known Problems Maternal Grandmother     No Known Problems Paternal Grandfather     No Known Problems Paternal Grandmother     No Known Problems Cousin  " "   ADD / ADHD Neg Hx     Alcohol abuse Neg Hx     Anxiety disorder Neg Hx     Bipolar disorder Neg Hx     Dementia Neg Hx     Depression Neg Hx     Drug abuse Neg Hx     OCD Neg Hx     Paranoid behavior Neg Hx     Schizophrenia Neg Hx     Seizures Neg Hx     Self-Injury Neg Hx     Suicide Attempts Neg Hx        Social History:  Highest education: 11th grade, got GED  Currently living: homeless  Relationships: limited, reportedly stays w/ an aunt sometimes  Children: none  Occupation: unemployed  Access to firearms: denies    Rest of social history as per below:    Social History     Socioeconomic History    Marital status: Single     Spouse name: Not on file    Number of children: Not on file    Years of education: Not on file    Highest education level: Not on file   Occupational History    Not on file   Tobacco Use    Smoking status: Some Days     Current packs/day: 0.25     Types: Cigarettes    Smokeless tobacco: Former   Vaping Use    Vaping status: Former    Substances: Nicotine, Flavoring   Substance and Sexual Activity    Alcohol use: No    Drug use: Yes     Types: Cocaine, \"Crack\" cocaine, Methamphetamines, Heroin     Comment: UDS +Cocaine    Sexual activity: Not Currently   Other Topics Concern    Not on file   Social History Narrative    Not on file     Social Determinants of Health     Financial Resource Strain: Not on file   Food Insecurity: Food Insecurity Present (9/24/2024)    Hunger Vital Sign     Worried About Running Out of Food in the Last Year: Sometimes true     Ran Out of Food in the Last Year: Sometimes true   Transportation Needs: No Transportation Needs (9/24/2024)    PRAPARE - Transportation     Lack of Transportation (Medical): No     Lack of Transportation (Non-Medical): No   Physical Activity: Not on file   Stress: Not on file   Social Connections: Not on file   Intimate Partner Violence: Not At Risk (9/24/2024)    Humiliation, Afraid, Rape, and Kick questionnaire     Fear of Current or " "Ex-Partner: No     Emotionally Abused: No     Physically Abused: No     Sexually Abused: No   Housing Stability: High Risk (9/24/2024)    Housing Stability Vital Sign     Unable to Pay for Housing in the Last Year: No     Number of Times Moved in the Last Year: 1     Homeless in the Last Year: Yes       Past Medical History:   Past Medical History:   Diagnosis Date    Addiction to drug ([facility])     Anxiety     Bipolar I disorder, most recent episode depressed, severe with psychotic features ([facility]) 02/23/2016    Depression     Foot infection     Hallucination     Hepatitis C virus infection 02/23/2016    has had treatment    Psychiatric illness     Psychosis ([facility])     Self-injurious behavior     Substance abuse ([facility])     Suicide attempt ([facility])     Withdrawal symptoms, drug or narcotic (HCC)         -----------------------------------  Objective    Temp:  [97.8 °F (36.6 °C)-98.6 °F (37 °C)] 98.3 °F (36.8 °C)  HR:  [74-84] 74  BP: ()/(55-72) 141/67  Resp:  [16-18] 18  SpO2:  [97 %-98 %] 97 %  O2 Device: None (Room air)    Mental Status Evaluation:  Appearance: casually dressed, consistent with stated age  Motor: no psychomotor retardation, no gait abnormalities  Behavior: cooperative, answers questions appropriately  Speech: normal volume, normal rhythm  Mood: \" I was so depressed when I was out\"  Affect: constricted, depressed-appearing  Thought Process: linear and goal-oriented  Thought Content: denies delusions  Risk Potential: Passive SI, denies plan, or intent. Denies homicidal ideation  Perceptions: denies auditory hallucinations, denies visual hallucinations,   Sensorium: Oriented to person, place, time, and situation  Cognition: cognitive ability appears intact but was not quantitatively tested  Consciousness: alert and awake  Attention: intact, able to focus without difficulty  Insight: limited  Judgement: limited      Meds/Allergies   Allergies   Allergen Reactions    Acetaminophen Vomiting     Pt. States it " "will hurt his liver         Behavioral Health Medications: all current active meds have been reviewed as per above. Changes as per above. Risks, benefits, indications, and alternatives to treatment were discussed with patient.     Laboratory results:  I have personally reviewed all pertinent laboratory/tests results.  Recent Results (from the past 48 hour(s))   ECG 12 lead    Collection Time: 10/01/24  2:13 PM   Result Value Ref Range    Ventricular Rate 86 BPM    Atrial Rate 86 BPM    [place] Interval 150 ms    QRSD Interval 88 ms    QT Interval 374 ms    QTC Interval 447 ms    P Axis 76 degrees    QRS Axis 61 degrees    T Wave [place] 38 degrees   HS Troponin 0hr (reflex protocol)    Collection Time: 10/01/24  2:23 PM   Result Value Ref Range    hs TnI 0hr 15 \"Refer to ACS Flowchart\"- see link ng/L   Rapid drug screen, urine    Collection Time: 10/01/24  2:23 PM   Result Value Ref Range    Amph/Meth UR Negative Negative    Barbiturate Ur Negative Negative    Benzodiazepine Urine Negative Negative    Cocaine Urine Positive (A) Negative    Methadone Urine Negative Negative    Opiate Urine Negative Negative    PCP Ur Negative Negative    THC Urine Negative Negative    Oxycodone Urine Negative Negative    Fentanyl Urine Positive (A) Negative    HYDROCODONE URINE Negative Negative   UA w Reflex to Microscopic w Reflex to Culture    Collection Time: 10/01/24  2:23 PM    Specimen: Urine, Other   Result Value Ref Range    Color, UA Light Yellow     Clarity, UA Clear     Specific Gravity, UA 1.011 1.003 - 1.030    pH, UA 7.5 4.5, 5.0, 5.5, 6.0, 6.5, 7.0, 7.5, 8.0    Leukocytes, UA Negative Negative    Nitrite, UA Negative Negative    Protein, UA Negative Negative mg/dl    Glucose, UA Negative Negative mg/dl    Ketones, UA Negative Negative mg/dl    Urobilinogen, UA <2.0 <2.0 mg/dl mg/dl    Bilirubin, UA Negative Negative    Occult Blood, UA Negative Negative   CBC and differential    Collection Time: 10/01/24  3:03 PM   Result " "Value Ref Range    WBC 8.79 4.31 - 10.16 Thousand/uL    RBC 3.77 (L) 3.88 - 5.62 Million/uL    Hemoglobin 11.7 (L) 12.0 - 17.0 g/dL    Hematocrit 35.3 (L) 36.5 - 49.3 %    MCV 94 82 - 98 fL    MCH 31.0 26.8 - 34.3 pg    MCHC 33.1 31.4 - 37.4 g/dL    RDW 15.8 (H) 11.6 - 15.1 %    MPV 10.5 8.9 - 12.7 fL    Platelets 295 149 - 390 Thousands/uL    nRBC 0 /100 WBCs    Segmented % 87 (H) 43 - 75 %    Immature Grans % 0 0 - 2 %    Lymphocytes % 6 (L) 14 - 44 %    Monocytes % 7 4 - 12 %    Eosinophils Relative 0 0 - 6 %    Basophils Relative 0 0 - 1 %    Absolute Neutrophils 7.61 1.85 - 7.62 Thousands/µL    Absolute Immature Grans 0.03 0.00 - 0.20 Thousand/uL    Absolute Lymphocytes 0.51 (L) 0.60 - 4.47 Thousands/µL    Absolute Monocytes 0.61 0.17 - 1.22 Thousand/µL    Eosinophils Absolute 0.01 0.00 - 0.61 Thousand/µL    Basophils Absolute 0.02 0.00 - 0.10 Thousands/µL   Comprehensive metabolic panel    Collection Time: 10/01/24  3:03 PM   Result Value Ref Range    Sodium 136 135 - 147 mmol/L    Potassium 3.8 3.5 - 5.3 mmol/L    Chloride 100 96 - 108 mmol/L    CO2 29 21 - 32 mmol/L    ANION GAP 7 4 - 13 mmol/L    BUN 15 5 - 25 mg/dL    Creatinine 0.64 0.60 - 1.30 mg/dL    Glucose 92 65 - 140 mg/dL    Calcium 9.2 8.4 - 10.2 mg/dL    AST 99 (H) 13 - 39 U/L    ALT 86 (H) 7 - 52 U/L    Alkaline Phosphatase 85 34 - 104 U/L    Total Protein 7.3 6.4 - 8.4 g/dL    Albumin 4.3 3.5 - 5.0 g/dL    Total Bilirubin 1.38 (H) 0.20 - 1.00 mg/dL    eGFR 113 ml/min/1.73sq m   TSH    Collection Time: 10/01/24  3:03 PM   Result Value Ref Range    TSH 3RD GENERATON 1.176 0.450 - 4.500 uIU/mL   POCT alcohol breath test    Collection Time: 10/01/24  3:04 PM   Result Value Ref Range    EXTBreath Alcohol 0.000    HS Troponin I 2hr    Collection Time: 10/01/24  4:22 PM   Result Value Ref Range    hs TnI 2hr 14 \"Refer to ACS Flowchart\"- see link ng/L    Delta 2hr hsTnI -1 <20 ng/L   ECG 12 lead    Collection Time: 10/02/24 10:36 AM   Result Value Ref " Range    Ventricular Rate 68 BPM    Atrial Rate 68 BPM    MT Interval 152 ms    QRSD Interval 90 ms    QT Interval 402 ms    QTC Interval 427 ms    P [place] 63 degrees    QRS Axis 9 degrees    T Wave Axis 20 degrees   Comprehensive metabolic panel    Collection Time: 10/02/24  2:06 PM   Result Value Ref Range    Sodium 141 135 - 147 mmol/L    Potassium 3.9 3.5 - 5.3 mmol/L    Chloride 103 96 - 108 mmol/L    CO2 33 (H) 21 - 32 mmol/L    ANION GAP 5 4 - 13 mmol/L    BUN 18 5 - 25 mg/dL    Creatinine 0.73 0.60 - 1.30 mg/dL    Glucose 98 65 - 140 mg/dL    Calcium 9.2 8.4 - 10.2 mg/dL    AST 67 (H) 13 - 39 U/L    ALT 66 (H) 7 - 52 U/L    Alkaline Phosphatase 80 34 - 104 U/L    Total Protein 6.8 6.4 - 8.4 g/dL    Albumin 3.9 3.5 - 5.0 g/dL    Total Bilirubin 0.98 0.20 - 1.00 mg/dL    eGFR 107 ml/min/1.73sq m   CBC and differential    Collection Time: 10/02/24  2:06 PM   Result Value Ref Range    WBC 3.95 (L) 4.31 - 10.16 Thousand/uL    RBC 3.85 (L) 3.88 - 5.62 Million/uL    Hemoglobin 12.4 12.0 - 17.0 g/dL    Hematocrit 35.7 (L) 36.5 - 49.3 %    MCV 93 82 - 98 fL    MCH 32.2 26.8 - 34.3 pg    MCHC 34.7 31.4 - 37.4 g/dL    RDW 15.5 (H) 11.6 - 15.1 %    MPV 10.0 8.9 - 12.7 fL    Platelets 269 149 - 390 Thousands/uL    nRBC 0 /100 WBCs    Segmented % 60 43 - 75 %    Immature Grans % 0 0 - 2 %    Lymphocytes % 26 14 - 44 %    Monocytes % 10 4 - 12 %    Eosinophils Relative 3 0 - 6 %    Basophils Relative 1 0 - 1 %    Absolute Neutrophils 2.38 1.85 - 7.62 Thousands/µL    Absolute Immature Grans 0.01 0.00 - 0.20 Thousand/uL    Absolute Lymphocytes 1.02 0.60 - 4.47 Thousands/µL    Absolute Monocytes 0.41 0.17 - 1.22 Thousand/µL    Eosinophils Absolute 0.11 0.00 - 0.61 Thousand/µL    Basophils Absolute 0.02 0.00 - 0.10 Thousands/µL   Vitamin B12    Collection Time: 10/02/24  2:06 PM   Result Value Ref Range    Vitamin B-12 231 180 - 914 pg/mL   Folate    Collection Time: 10/02/24  2:06 PM   Result Value Ref Range    Folate 21.0  >5.9 ng/mL   Vitamin D 25 hydroxy    Collection Time: 10/02/24  2:06 PM   Result Value Ref Range    Vit D, 25-Hydroxy 24.0 (L) 30.0 - 100.0 ng/mL   Lipid panel    Collection Time: 10/02/24  2:06 PM   Result Value Ref Range    Cholesterol 167 See Comment mg/dL    Triglycerides 115 See Comment mg/dL    HDL, Direct 70 >=40 mg/dL    LDL Calculated 74 0 - 100 mg/dL    Non-HDL-Chol (CHOL-HDL) 97 mg/dl   Total Syphilis (IgG/IgM) Screening    Collection Time: 10/02/24  2:06 PM   Result Value Ref Range    Syphilis Total Antibody Non-reactive Non-Reactive        Progress Toward Goals & Illness Status: Patient is not at goal. They are psychiatrically unstable and are not yet ready for discharge. The patient's condition currently requires active psychopharmacological medication management, interdisciplinary coordination with case management, and the utilization of adjunctive milieu and group therapy to augment psychopharmacological efficacy. The patient's risk of morbidity, and progression or decompensation of psychiatric disease, is high without this current treatment.           -----------------------------------    Risks / Benefits of Treatment:     Risks, benefits, and possible side effects of medications explained to patient. The patient verbalizes understanding and agreement for treatment.     Counseling / Coordination of Care:     Patient's presentation on admission and proposed treatment plan were discussed with the treatment team.  Diagnosis, medication changes and treatment plan were reviewed with the patient.  Recent stressors were discussed with the patient.  Events leading to admission were reviewed with the patient.  Importance of medication and treatment compliance was reviewed with the patient.          Inpatient Psychiatric Certification:     Certification: Based upon physical, mental and social evaluations, I certify that inpatient psychiatric services are medically necessary for this patient for a duration of  5-7 midnights (exact duration TBD pending patient's response to psychiatric treatment) for the diagnosis listed above.     Available alternative community resources do not meet the patient's mental health care needs.  I further attest that an established written individualized plan of care has been implemented and is outlined in the patient's medical records.        This note has been constructed using a voice recognition system. There may be translation, syntax, or grammatical errors. If you have any questions, please contact the dictating provider.

## 2024-10-03 NOTE — TREATMENT TEAM
"Pt completed admission self assessment. (See copy in chart)  Pt signed.  Reviewed group schedule and encouraged attendance when able. Pt stated biggest stressor leading to admission \"suicidal thoughts\"  Pt enjoys music and sports. Encouraged pt to speak with Dr and make needs known.  Provided journal.  Pt asked about having colored pencils.  Pt goal :\"To better myself with mental health and go to rehab\"         10/03/24 1045   Activity/Group Checklist   Group Admission/Discharge   Attendance Attended   Attendance Duration (min) 16-30   Interactions Interacted appropriately   Affect/Mood Blunted/flat   Goals Achieved Identified feelings;Identified triggers;Identified relapse prevention strategies;Discussed coping strategies;Increased hopefulness;Identified resources and support systems;Able to listen to others;Able to engage in interactions;Able to reflect/comment on own behavior;Verbalized increased hopefulness;Able to manage/cope with feelings;Able to self-disclose;Able to recieve feedback       "

## 2024-10-03 NOTE — PLAN OF CARE
Problem: Alteration in Thoughts and Perception  Goal: Verbalize thoughts and feelings  Description: Interventions:  - Promote a nonjudgmental and trusting relationship with the patient through active listening and therapeutic communication  - Assess patient's level of functioning, behavior and potential for risk  - Engage patient in 1 on 1 interactions  - Encourage patient to express fears, feelings, frustrations, and discuss symptoms    - Girdler patient to reality, help patient recognize reality-based thinking   - Administer medications as ordered and assess for potential side effects  - Provide the patient education related to the signs and symptoms of the illness and desired effects of prescribed medications  Outcome: Progressing     Problem: Ineffective Coping  Goal: Identifies ineffective coping skills  Outcome: Progressing  Goal: Identifies healthy coping skills  Outcome: Progressing  Goal: Demonstrates healthy coping skills  Outcome: Progressing     Problem: Risk for Self Injury/Neglect  Goal: Refrain from harming self  Description: Interventions:  - Monitor patient closely, per order  - Develop a trusting relationship  - Supervise medication ingestion, monitor effects and side effects   Outcome: Progressing     Problem: Depression  Goal: Refrain from isolation  Description: Interventions:  - Develop a trusting relationship   - Encourage socialization   Outcome: Progressing  Goal: Refrain from self-neglect  Outcome: Progressing     Problem: PAIN - ADULT  Goal: Verbalizes/displays adequate comfort level or baseline comfort level  Description: Interventions:  - Encourage patient to monitor pain and request assistance  - Assess pain using appropriate pain scale  - Administer analgesics based on type and severity of pain and evaluate response  - Implement non-pharmacological measures as appropriate and evaluate response  - Consider cultural and social influences on pain and pain management  - Notify  physician/advanced practitioner if interventions unsuccessful or patient reports new pain  Outcome: Progressing     Problem: Alteration in Thoughts and Perception  Goal: Attend and participate in unit activities, including therapeutic, recreational, and educational groups  Description: Interventions:  -Encourage Visitation and family involvement in care  Outcome: Progressing

## 2024-10-03 NOTE — NURSING NOTE
Pt constricted and withdrawn with good appetite at breakfast, refused lunch.  VSS.  Attended group.  Denied SI.  Monitored for safety and support.

## 2024-10-04 PROCEDURE — 99232 SBSQ HOSP IP/OBS MODERATE 35: CPT | Performed by: PSYCHIATRY & NEUROLOGY

## 2024-10-04 RX ORDER — CLOTRIMAZOLE 1 %
CREAM (GRAM) TOPICAL 2 TIMES DAILY
Status: DISCONTINUED | OUTPATIENT
Start: 2024-10-04 | End: 2024-10-07 | Stop reason: HOSPADM

## 2024-10-04 RX ADMIN — Medication 2000 UNITS: at 09:30

## 2024-10-04 RX ADMIN — QUETIAPINE FUMARATE 50 MG: 50 TABLET ORAL at 09:30

## 2024-10-04 RX ADMIN — CYANOCOBALAMIN TAB 1000 MCG 1000 MCG: 1000 TAB at 09:33

## 2024-10-04 RX ADMIN — QUETIAPINE FUMARATE 200 MG: 100 TABLET ORAL at 22:13

## 2024-10-04 RX ADMIN — SENNOSIDES 17.2 MG: 8.6 TABLET, FILM COATED ORAL at 21:07

## 2024-10-04 RX ADMIN — BUPRENORPHINE AND NALOXONE 8 MG: 8; 2 FILM BUCCAL; SUBLINGUAL at 08:16

## 2024-10-04 RX ADMIN — CLOTRIMAZOLE: 1 CREAM TOPICAL at 14:51

## 2024-10-04 RX ADMIN — MELATONIN TAB 3 MG 3 MG: 3 TAB at 21:07

## 2024-10-04 RX ADMIN — BUPRENORPHINE AND NALOXONE 8 MG: 8; 2 FILM BUCCAL; SUBLINGUAL at 17:30

## 2024-10-04 RX ADMIN — FLUOXETINE HYDROCHLORIDE 40 MG: 20 CAPSULE ORAL at 09:30

## 2024-10-04 RX ADMIN — PANTOPRAZOLE SODIUM 40 MG: 40 TABLET, DELAYED RELEASE ORAL at 06:01

## 2024-10-04 RX ADMIN — QUETIAPINE FUMARATE 50 MG: 50 TABLET ORAL at 17:30

## 2024-10-04 NOTE — PROGRESS NOTES
10/04/24 1100   Activity/Group Checklist   Group Wellness  (ocean meditation/discussion on emotions.)   Attendance Attended   Attendance Duration (min) 31-45   Interactions Interacted appropriately   Affect/Mood Appropriate;Calm;Normal range   Goals Achieved Able to engage in interactions;Able to listen to others

## 2024-10-04 NOTE — ASSESSMENT & PLAN NOTE
Patient is a 53-year-old male with past medical history of hep C, GERD, chronic hep B and OUD as well as past psychiatric history of bipolar disorder. Patient self presented to outside hospital ED with thoughts of wanting to harm himself and signed a 201. He was admitted to [facility] for further stabilization.     Patient's depressive symptoms seem to be stabilizing/improving.  [facility] potential discharge/follow-up option.    -resume prozac 40 mg QD  -resume seroquel 200 HS and 50 BID

## 2024-10-04 NOTE — ASSESSMENT & PLAN NOTE
"-as per SLIM  Per chart review, patient has a history of hepatitis C.  Unclear if this has been treated, per chart review, patient is \"status post failed treatment \"  AST and ALT are mildly elevated but improved from prior  Patient to follow-up with gastroenterology after discharge  Will place an ambulatory referral to GI  "

## 2024-10-04 NOTE — NURSING NOTE
Pt pleasant and med-compliant with good appetite and steady gait.  VSS.  Attended group.  Denied SI.  Monitored for safety and support.

## 2024-10-04 NOTE — CASE MANAGEMENT
MARS/HOST assessment complete. Pt eligible for funding. Bed search being completed- [facility] is not a contracted facility, however, a bed at [facility] and/or [facility] is being searched for.

## 2024-10-04 NOTE — PROGRESS NOTES
"Progress Note - Behavioral Health   Name: Perez Stack Jr. 53 y.o. male I MRN: [id number]  Unit/Bed#: [facility] 649-01 I Date of Admission: 10/2/2024   Date of Service: 10/4/2024 I Hospital Day: 2     Assessment & Plan  Bipolar disorder (HCC)  Patient is a 53-year-old male with past medical history of hep C, GERD, chronic hep B and OUD as well as past psychiatric history of bipolar disorder. Patient self presented to outside hospital ED with thoughts of wanting to harm himself and signed a 201. He was admitted to [facility] for further stabilization.     Patient's depressive symptoms seem to be stabilizing/improving.  Social work to look into [facility] rehab for patient as a potential discharge/follow-up option.    -resume prozac 40 mg QD  -resume seroquel 200 HS and 50 BID  Opioid use disorder in remission  -suboxone 8 mg film QD and   Hepatitis C virus infection  -as per SLIM  Per chart review, patient has a history of hepatitis C.  Unclear if this has been treated, per chart review, patient is \"status post failed treatment \"  AST and ALT are mildly elevated but improved from prior  Patient to follow-up with gastroenterology after discharge  Will place an ambulatory referral to GI  Gastroesophageal reflux disease  -as per SLIM  Resume Protonix as previously recommended by GI  Maalox and Pepcid as needed  Chronic hepatitis B virus infection (HCC)  -as per [facility]  Plan for outpatient follow-up with GI   Medical clearance for psychiatric admission  -as per SLIM  Severe protein-calorie malnutrition (HCC)  Malnutrition Findings:       -as per SLIM                        BMI Findings:           Body mass index is 21.97 kg/m².         Plan     Recommended Treatment:    - Encourage early mobility and having a structured day  - Provide frequent re-orientation, and cognitive stimulation  - Ensure assistive devices are in proper working order (eye-glasses, hearing aids)  - Encourage adequate hydration, nutrition and " monitor bowel movements  - Maintain sleep-wake cycle: Uninterrupted sleep time; low-level lighting at night  - Fall precaution  - f/u SLIM recs regarding the medical problems   - Continue medication titration and treatment plan; adjust medication to optimize treatment response and as clinically indicated. .   - Observation: routine            - VS: as per unit protocol  - Diet: Regular diet  - Encourage group attendance and milieu therapy  - Dispo: To be determined     Scheduled medications:  Current Facility-Administered Medications   Medication Dose Route Frequency Provider Last Rate    aluminum-magnesium hydroxide-simethicone  30 mL Oral Q4H PRN Kandice Vicente MD      buprenorphine-naloxone  8 mg Sublingual After Dinner Tony Parham MD      buprenorphine-naloxone  8 mg Sublingual Daily Tony Parham MD      Cholecalciferol  2,000 Units Oral Daily CLIF Kan      clotrimazole   Topical BID CLIF Kan      cyanocobalamin  1,000 mcg Oral Daily CLIF Kan      famotidine  20 mg Oral BID PRN CLIF Kan      FLUoxetine  40 mg Oral Daily Tony Parham MD      haloperidol lactate  5 mg Intramuscular Q4H PRN Max 4/day Kandice Vicente MD      hydrOXYzine HCL  25 mg Oral Q6H PRN Max 4/day Kandice Vicente MD      hydrOXYzine HCL  50 mg Oral Q6H PRN Max 4/day Kandice Vicente MD      ibuprofen  200 mg Oral Q6H PRN Kandice Vicente MD      ibuprofen  400 mg Oral Q6H PRN Kandice Vicente MD      ibuprofen  600 mg Oral Q8H PRN Kandice Vicente MD      melatonin  3 mg Oral HS Kandice Vicente MD      methocarbamol  500 mg Oral Q8H PRN CLIF Kan      nicotine polacrilex  4 mg Oral Q2H PRN Kandice Vicente MD      ondansetron  4 mg Oral Q8H PRN CLIF Kan      pantoprazole  40 mg Oral Early Morning CLIF Kan      polyethylene glycol  17 g Oral Daily CLIF Kan       "propranolol  5 mg Oral Q8H PRN Kandice Vicente MD      QUEtiapine  200 mg Oral HS Kandice Vicente MD      QUEtiapine  50 mg Oral BID Tony Parham MD      risperiDONE  0.25 mg Oral Q4H PRN Max 6/day Kandice Vicente MD      risperiDONE  0.5 mg Oral Q4H PRN Max 3/day Kandice Vicetne MD      risperiDONE  1 mg Oral Q2H PRN Max 3/day Kandice Vicente MD      senna  2 tablet Oral HS Radha Solano CLIF Jang      traZODone  50 mg Oral Q6H PRN Max 3/day Kandice Vicente MD        PRN:    aluminum-magnesium hydroxide-simethicone    famotidine    haloperidol lactate    hydrOXYzine HCL    hydrOXYzine HCL    ibuprofen    ibuprofen    ibuprofen    methocarbamol    nicotine polacrilex    ondansetron    propranolol    risperiDONE    risperiDONE    risperiDONE    traZODone       Subjective     Patient was visited on unit for continuing care; chart reviewed and discussed with multidisciplinary treatment team.  On approach, the patient was calm and cooperative. Denied any changes in mood, appetite, and energy level. No problem initiating and maintaining sleep.  Denied A/VH currently.  Denied SI/HI, intent or plan upon direct inquiry at this time.  Continues to have some depressive symptoms but improving.    Patient continues to be visible in the milieu and interacts with staff and peers. No reports of aggression or self-injurious behavior on unit. No PRN medications used in the past 24 hours.  Social work looking into [facility] rehab for patient.    Patient accepted all offered medications and no adverse effects of medications noted or reported.    Objective    Current Mental Status Evaluation:  Appearance: casually dressed, consistent with stated age  Motor: no psychomotor retardation, no gait abnormalities  Behavior: cooperative, answers questions appropriately  Speech: soft, normal rhythm  Mood: \"I'm alright\"  Affect: constricted, less depressed-appearing  Thought Process: linear and goal-oriented  Thought Content: " denies delusions  Risk Potential: denies suicidal ideation, plan, or intent. Denies homicidal ideation  Perceptions: denies auditory hallucinations, denies visual hallucinations,   Sensorium: Oriented to person, place, time, and situation  Cognition: cognitive ability appears intact but was not quantitatively tested  Consciousness: alert and awake  Attention: intact, able to focus without difficulty  Insight: fair  Judgement: limited            Vital signs in last 24 hours:    Temp:  [97.2 °F (36.2 °C)-98.1 °F (36.7 °C)] 98.1 °F (36.7 °C)  HR:  [] 82  BP: (114-174)/(56-88) 114/56  Resp:  [16-20] 16  SpO2:  [98 %-99 %] 98 %  O2 Device: None (Room air)    Psychiatric Review of Systems:  Medication adverse effects: none  Sleep: unchanged  Appetite: unchanged  Behaviors over the past 24 hours: unchanged    Laboratory results:    I have personally reviewed all pertinent laboratory/tests results  Recent Results (from the past 48 hour(s))   Comprehensive metabolic panel    Collection Time: 10/02/24  2:06 PM   Result Value Ref Range    Sodium 141 135 - 147 mmol/L    Potassium 3.9 3.5 - 5.3 mmol/L    Chloride 103 96 - 108 mmol/L    CO2 33 (H) 21 - 32 mmol/L    ANION GAP 5 4 - 13 mmol/L    BUN 18 5 - 25 mg/dL    Creatinine 0.73 0.60 - 1.30 mg/dL    Glucose 98 65 - 140 mg/dL    Calcium 9.2 8.4 - 10.2 mg/dL    AST 67 (H) 13 - 39 U/L    ALT 66 (H) 7 - 52 U/L    Alkaline Phosphatase 80 34 - 104 U/L    Total Protein 6.8 6.4 - 8.4 g/dL    Albumin 3.9 3.5 - 5.0 g/dL    Total Bilirubin 0.98 0.20 - 1.00 mg/dL    eGFR 107 ml/min/1.73sq m   CBC and differential    Collection Time: 10/02/24  2:06 PM   Result Value Ref Range    WBC 3.95 (L) 4.31 - 10.16 Thousand/uL    RBC 3.85 (L) 3.88 - 5.62 Million/uL    Hemoglobin 12.4 12.0 - 17.0 g/dL    Hematocrit 35.7 (L) 36.5 - 49.3 %    MCV 93 82 - 98 fL    MCH 32.2 26.8 - 34.3 pg    MCHC 34.7 31.4 - 37.4 g/dL    RDW 15.5 (H) 11.6 - 15.1 %    MPV 10.0 8.9 - 12.7 fL    Platelets 269 149 - 390  Thousands/uL    nRBC 0 /100 WBCs    Segmented % 60 43 - 75 %    Immature Grans % 0 0 - 2 %    Lymphocytes % 26 14 - 44 %    Monocytes % 10 4 - 12 %    Eosinophils Relative 3 0 - 6 %    Basophils Relative 1 0 - 1 %    Absolute Neutrophils 2.38 1.85 - 7.62 Thousands/µL    Absolute Immature Grans 0.01 0.00 - 0.20 Thousand/uL    Absolute Lymphocytes 1.02 0.60 - 4.47 Thousands/µL    Absolute Monocytes 0.41 0.17 - 1.22 Thousand/µL    Eosinophils Absolute 0.11 0.00 - 0.61 Thousand/µL    Basophils Absolute 0.02 0.00 - 0.10 Thousands/µL   Vitamin B12    Collection Time: 10/02/24  2:06 PM   Result Value Ref Range    Vitamin B-12 231 180 - 914 pg/mL   Folate    Collection Time: 10/02/24  2:06 PM   Result Value Ref Range    Folate 21.0 >5.9 ng/mL   Vitamin D 25 hydroxy    Collection Time: 10/02/24  2:06 PM   Result Value Ref Range    Vit D, 25-Hydroxy 24.0 (L) 30.0 - 100.0 ng/mL   Lipid panel    Collection Time: 10/02/24  2:06 PM   Result Value Ref Range    Cholesterol 167 See Comment mg/dL    Triglycerides 115 See Comment mg/dL    HDL, Direct 70 >=40 mg/dL    LDL Calculated 74 0 - 100 mg/dL    Non-HDL-Chol (CHOL-HDL) 97 mg/dl   Total Syphilis (IgG/IgM) Screening    Collection Time: 10/02/24  2:06 PM   Result Value Ref Range    Syphilis Total Antibody Non-reactive Non-Reactive          Progress Toward Goals & Illness Status:   progressing    Patient is not at goal. They are not yet ready for discharge. The patient's condition currently requires active psychopharmacological medication management, interdisciplinary coordination with case management, and the utilization of adjunctive milieu and group therapy to augment psychopharmacological efficacy. The patient's risk of morbidity, and progression or decompensation of psychiatric disease, is higher without this current treatment.     Next of Kin  Extended Emergency Contact Information  Primary Emergency Contact: Perez Tucker  Mobile Phone: [phone]  Relation:  Nephew    Counseling / Coordination of Care  Patient's progress discussed with staff in treatment team meeting.  Medications, treatment progress and treatment plan reviewed with patient.  Medication education provided to patient.  Educated on importance of medication and treatment compliance.  Supportive therapy provided to patient.  Cognitive techniques utilized during the session.  Reassurance and supportive therapy provided.  Reoriented to reality and reassured.  Encouraged participation in milieu and group therapy on the unit.  Crisis/safety plan discussed with patient.       Tony Parham MD  Attending Psychiatrist   [facility]      This note has been constructed using a voice recognition system. There may be translation, syntax, or grammatical errors. If you have any questions, please contact the dictating provider.

## 2024-10-04 NOTE — PROGRESS NOTES
10/03/24 2113   Admission   Release of Information Signed Yes  ([facility]; [facility]; [facility])

## 2024-10-04 NOTE — PROGRESS NOTES
10/04/24 0743   Team Meeting   Meeting Type Daily Rounds   Initial Conference Date 10/04/24   Next Conference Date 10/07/24   Team Members Present   Team Members Present Physician;Nurse;;   Physician Team Member Dr Cardona, BRUNO Richter   Nursing Team Member Carline   Care Management Team Member Jesusita   Social Work Team Member Chanel   Patient/Family Present   Patient Present No   Patient's Family Present No     100% meals, homeless, demanding, refusing vitals, wanted lights off when came, looking into [facility]

## 2024-10-04 NOTE — PROGRESS NOTES
10/03/24 4518   Team Meeting   Meeting Type Daily Rounds   Initial Conference Date 10/03/24   Next Conference Date 10/31/24   Team Members Present   Team Members Present Physician;Nurse;   Physician Team Member Dr Parham   Nursing Team Member [facility]   Care Management Team Member Jesusita   Patient/Family Present   Patient Present Yes   Patient's Family Present No   OTHER   Team Meeting - Additional Comments Met with the patient to go over the treatment plan. Goals discussed were to have a decrease in depressive symptoms, and an improvement in insight. He can attain this with medication management and group/milieu treatment. Perez agreed with his treatment plan and signed.

## 2024-10-04 NOTE — CASE MANAGEMENT
Case Management Assessment    Patient name Perez Stack Jr.  [facility] [id number]  : 1970 Date 10/3/2024       Current Admission Date: 10/2/2024  Current Admission Diagnosis:Bipolar disorder (HCC)   Patient Active Problem List    Diagnosis Date Noted Date Diagnosed    Severe protein-calorie malnutrition (HCC) 2024     Elevated LFTs 2024     Toe pain 2024     Low TSH level 2024     Dysphagia 2023     Opioid use disorder in remission 04/10/2023     Severe stimulant use disorder (HCC) 2023     Medical clearance for psychiatric admission 2023     Bipolar disorder (HCC) 2016     Hepatitis C virus infection 2016     Gastroesophageal reflux disease 2016     Chronic hepatitis B virus infection (HCC) 2016     Cellulitis and abscess of toe of left foot 2016       LOS (days): 1  Geometric Mean LOS (GMLOS) (days):   Days to GMLOS:     OBJECTIVE:  PATIENT READMITTED TO HOSPITAL  Risk of Unplanned Readmission Score: 13.33         Current admission status: Inpatient Psych  Referral Reason: Psych    Preferred Pharmacy:    [facility] [place] [id number]  Phone: [phone] Fax: [phone]    Primary Care Provider: No primary care provider on file.    Primary Insurance: NELL GRACE PENDING  Secondary Insurance:     ASSESSMENT:  Active Health Care Proxies    There are no active Health Care Proxies on file.                 Readmission Root Cause  30 Day Readmission: Yes  During your hospital stay, did someone (provider, nurse, ) explain your care to you in a way you could understand?: Yes  Did you feel medically stable to leave the hospital?: No  Were you able to pay for your medication at the pharmacy?: No  Did you have reliable transportation to take you to your appointments?: No  During previous admission, was a post-acute recommendation made?: No  Patient was  readmitted due to: SI, depression, lonliness, and multiple psychosocial stressors.  Action Plan: Patient is PA/MA Pending, connect patient with [facility] to obtain his suboxone, contact county regarding [facility] MH treatment.    Patient Information  Mental Status: Alert  Primary Caregiver: Self              Patient Information Continued  Income Source: Unemployed  Current Status:: 201         Means of Transportation  Means of Transport to Appts:: License/No car      Social Determinants of Health (SDOH)      Flowsheet Row Most Recent Value   Housing Stability    In the last 12 months, was there a time when you were not able to pay the mortgage or rent on time? Y   In the past 12 months, how many times have you moved where you were living? 1   At any time in the past 12 months, were you homeless or living in a shelter (including now)? Y   Transportation Needs    In the past 12 months, has lack of transportation kept you from medical appointments or from getting medications? no   In the past 12 months, has lack of transportation kept you from meetings, work, or from getting things needed for daily living? No   Food Insecurity    Within the past 12 months, you worried that your food would run out before you got the money to buy more. Sometimes   Within the past 12 months, the food you bought just didn't last and you didn't have money to get more. Sometimes   Utilities    In the past 12 months has the electric, gas, oil, or water company threatened to shut off services in your home? No          Biopsychosocial Assessment  Older Adult Behavioral Health Unit  Social Work Case Management Note  Jesusita Whitaker LMSW    Summary:   CM met with the patient to initiate assessment. Reviewed the admission and the role of CM. 53-year-old male with past medical history of hep C, GERD, chronic hep B and OUD as well as past psychiatric history of bipolar disorder.  Patient self-presented to -B ED with thoughts of wanting to harm  "himself and signed a 201.  He was admitted to [address] for further stabilization.  The patient was recently discharged from [facility], patient states he was discharged too soon. Patient's stressors are psychosocial, this is what caused him to relapse on substances after his recent discharge.  We will attempt to reconfirm the services that [place] had set up for him and see if there are any other follow-up services we can provide in terms of medication management when he gets discharged in lieu of his insurance situation.   Patient Name: Perez StackJr.     Address: no address - homeless    Phone Number: [phone]    Pharmacy:  [facility] [id number] [[id number]]    Insurance: PA/MA Pending    /Age: 53 y.o. - 1970    Admission Date: 10/2/24    Diagnosis/D&A: Bipolar disorder    County: [place]    Commitment: 201     Admitted from: [facility] ED    POA/Guardian: no    Living Situation: homeless, patient reports he called  and they could not help him.    Access to firearms: denies    Presenting Problem: Per crisis worker in ED, Nova Beard, “Pt is a 53 y.o. male who was brought to the ED with Patient presents to ED due to hopelessness and suicidal ideations. Patient states that he was discharged from [facility] yesterday and felt that he was not ready but was just 'pushed out'. Patient states that he spent the entire night just wandering the streets trying to end it, and doesn't even know how he got to [place] from [place]. Patient reports significant hopelessness and feeling lonely because he doesn't have any supports or family. Patient reports confusion and gaps in time causing him to not accurately remember things. Patient states when he is angry or very upset, he \"blacks out\" and cannot recall the specifics of the event.He believes he has family that lives around and last night tried to find them, but couldn't remember where their houses were or if they " still live there. Patient expressed a history of depression, but feels that last night was the worst feeling he's ever had. A friend had seen him wandering, so picked him up and suggested he come back to the hospital for help. Patient reports suicidal ideations all through the night and kept thinking of various plans to kill himself. While walking across the bridge he thought about jumping into traffic, but got high instead to distract him from those thoughts. Patient has a history of suicidal ideations and attempts via overdose and cutting. Patient denies homicidal ideations. He reports visual hallucinations of shadows, but denies any auditory hallucinations.      Patient reports multiple inpatient admissions in the past, both for mental health and substance use. He was most recently admitted to [facility] and discharged yesterday, but couldn't remember how long he was there. He expressed feeling worthless because of the way he was discharged, with just some papers and no medications- particularly suboxone. He would like to go to rehab following admission, but realizes his lack of insurance is a barrier. Patient is not active with outpatient providers because of being uninsured. He states he has been buying suboxone off the street for an extended period of time. Patient reports history of opiate abuse, but was clean from opiates for about 7 years. Recently he has been using crack and meth. Last night he went to buy crack, but could tell immediately that it was dope, which was upsetting because he hadn't used in so long. Patient states he did not sleep at all last night, and although he ate while in the ED, he felt he had to force it. Patient continues to feel unsafe outside of the hospital and feels he needs additional support and treatment. Patient signed a 201 and prefers not to return to [facility]. Patient was informed and understood that because he is uninsured, there is a barrier in bed placement. He was informed that [facility]  would not be considered tonight, but may have to reconsider tomorrow if unable to find placement elsewhere.”    Triggers for Hospitalization: SI, depression, lonliness    Signs, symptoms, decompensation pattern: self-medicates, shuts down, nervous, anxious    Previous psychiatric treatment: multiple IP stays, most recent 9/21-9/30/24  [facility]    Psychiatrist: none    Therapist: none    ACT/ICM/CPS/WRT/SC: none    PCP: [facility]     Family mental health history: pt unsure     History of physical aggression/violence: pt reports he has anger issues, blacks out, and then he doesn't know what happens, low frustration tolerance    History of self-harming behaviors, suicide attempts: SH: cutting SA: overdose    Current family, children, significant relationships: no family, no children, no friendships    Childhood/Adolescent history: pt reports he had trouble learning in school and was in “special classes”    Cultural/Spiritual/Worldview considerations: none    Legal Issues (past/present): denies    : no  Education: completed 11th grade, did not graduate, obtained GED  Employment/Vocational (past/present): unemployed  Transportation: license, no car    Financial/Benefit Information/Rep-Payee: no income    Medical history: see medical chart    Substance Use/Tobacco Use: smokes crack/cocaine, ½ pack cigarettes per day  UDS/Identified Substance(s) used: positive cocaine & fentanyl  Risks discussed included: yes  Recommendations discussed: yes  Patient's response: Patient would like IP rehab but does not have health insurance.     Trauma/Abuse/Losses: denies    Coping Skills: rests, listens to music    Strengths/Supports/Protective Factors: lacking    Barriers/Limitations in Recovery: insurance, substance use.    Patient identified goals for treatment: go to groups (but finds it hard to communicate/relate with the older adult population), work on anger and thinking skills.     HEATHER's obtained:  [facility]; [facility]; [facility]  Additional/Collateral Information: Per chart review at last admission, “[facility] @[phone] to discuss Suboxone treatment. They stated that the Pt can call [facility] and they will be able to help him with no insurance.

## 2024-10-04 NOTE — PROGRESS NOTES
10/03/24 2055   Referral Data   Referral Reason Psych   County Information   County of Residence [place]   Readmission Root Cause   30 Day Readmission Yes   During your hospital stay, did someone (provider, nurse, ) explain your care to you in a way you could understand? Yes   Did you feel medically stable to leave the hospital? No   Were you able to pay for your medication at the pharmacy? No   Did you have reliable transportation to take you to your appointments? No   During previous admission, was a post-acute recommendation made? No   Patient was readmitted due to SI, depression, lonliness, and multiple psychosocial stressors.   Action Plan Patient is PA/MA Pending, connect patient with [facility] to obtain his suboxone, contact [facility] regarding [facility] treatment.   Patient Information   Mental Status Alert   Primary Caregiver Self   Support System Other (Comment)  (Patient reports no supports.)   [place]/Cultural Requests none reported   Legal Information   Tx Plan Signed Yes   Current Status: 201   Legal Issues denies   Health Care Proxy Appointed No   Activities of Daily Living Prior to Admission   Functional Status Independent   Assistive Device No device needed   Living Arrangement Homeless   Ambulation Independent   Access to Firearms   Access to Firearms No   Income Information   Income Source Unemployed   Means of Transportation   Means of Transport to Hospitals in [place]: License/No car

## 2024-10-04 NOTE — NURSING NOTE
Pt irritable and avoiding staff this evening attempting to refuse vital signs. After staff obtained vitals pt attempting to refuse HS medication because he was upset that bedroom light was turned on for med administration informed pt that was not a good idea that he needed to show medication compliance, pt did take meds. Appetite good. Denies SI.HI. Q 7 minute checks maintained.

## 2024-10-04 NOTE — PLAN OF CARE
Problem: Alteration in Thoughts and Perception  Goal: Verbalize thoughts and feelings  Description: Interventions:  - Promote a nonjudgmental and trusting relationship with the patient through active listening and therapeutic communication  - Assess patient's level of functioning, behavior and potential for risk  - Engage patient in 1 on 1 interactions  - Encourage patient to express fears, feelings, frustrations, and discuss symptoms    - [place] patient to reality, help patient recognize reality-based thinking   - Administer medications as ordered and assess for potential side effects  - Provide the patient education related to the signs and symptoms of the illness and desired effects of prescribed medications  Outcome: Progressing     Problem: Ineffective Coping  Goal: Identifies ineffective coping skills  Outcome: Progressing  Goal: Identifies healthy coping skills  Outcome: Progressing     Problem: Risk for Self Injury/Neglect  Goal: Refrain from harming self  Description: Interventions:  - Monitor patient closely, per order  - Develop a trusting relationship  - Supervise medication ingestion, monitor effects and side effects   Outcome: Progressing

## 2024-10-05 PROCEDURE — 99232 SBSQ HOSP IP/OBS MODERATE 35: CPT | Performed by: NURSE PRACTITIONER

## 2024-10-05 RX ADMIN — MELATONIN TAB 3 MG 3 MG: 3 TAB at 21:20

## 2024-10-05 RX ADMIN — CYANOCOBALAMIN TAB 1000 MCG 1000 MCG: 1000 TAB at 08:34

## 2024-10-05 RX ADMIN — QUETIAPINE FUMARATE 50 MG: 50 TABLET ORAL at 17:55

## 2024-10-05 RX ADMIN — QUETIAPINE FUMARATE 200 MG: 100 TABLET ORAL at 21:20

## 2024-10-05 RX ADMIN — BUPRENORPHINE AND NALOXONE 8 MG: 8; 2 FILM BUCCAL; SUBLINGUAL at 08:34

## 2024-10-05 RX ADMIN — Medication 2000 UNITS: at 08:34

## 2024-10-05 RX ADMIN — FLUOXETINE HYDROCHLORIDE 40 MG: 20 CAPSULE ORAL at 08:34

## 2024-10-05 RX ADMIN — BUPRENORPHINE AND NALOXONE 8 MG: 8; 2 FILM BUCCAL; SUBLINGUAL at 17:55

## 2024-10-05 RX ADMIN — QUETIAPINE FUMARATE 50 MG: 50 TABLET ORAL at 08:34

## 2024-10-05 NOTE — PROGRESS NOTES
"  Progress Note - Behavioral Health   Name: Perez Stack Jr. 53 y.o. male I MRN: [id number]  Unit/Bed#: [facility] 649-01 I Date of Admission: 10/2/2024   Date of Service: 10/5/2024 I Hospital Day: 3     Assessment & Plan  Bipolar disorder (HCC)  Patient is a 53-year-old male with past medical history of hep C, GERD, chronic hep B and OUD as well as past psychiatric history of bipolar disorder. Patient self presented to outside hospital ED with thoughts of wanting to harm himself and signed a 201. He was admitted to [facility] for further stabilization.     Patient's depressive symptoms seem to be stabilizing/improving.  Social work to look into [facility] rehab for patient as a potential discharge/follow-up option.    -resume prozac 40 mg QD  -resume seroquel 200 HS and 50 BID  Opioid use disorder in remission  -suboxone 8 mg film QD and   Hepatitis C virus infection  -as per SLIM  Per chart review, patient has a history of hepatitis C.  Unclear if this has been treated, per chart review, patient is \"status post failed treatment \"  AST and ALT are mildly elevated but improved from prior  Patient to follow-up with gastroenterology after discharge  Will place an ambulatory referral to GI  Gastroesophageal reflux disease  -as per SLIM  Resume Protonix as previously recommended by GI  Maalox and Pepcid as needed  Chronic hepatitis B virus infection (HCC)  -as per [facility]  Plan for outpatient follow-up with GI   Medical clearance for psychiatric admission  -as per SLIM  Severe protein-calorie malnutrition (HCC)  Malnutrition Findings:       -as per SLIM                        BMI Findings:           Body mass index is 21.97 kg/m².       Progress Toward Goals: Pleasant on approach.  Mood is improving.    Recommended Treatment: Continue with group therapy, milieu therapy and occupational therapy.    As per the treatment team    Risks, benefits and possible side effects of Medications:   Risks, benefits, and possible " side effects of medications explained to patient and patient verbalizes understanding.      History of Present Illness   Behavior over the last 24 hours:  improved  Sleep: normal  Appetite: Improving.  Patient is requesting Ensure with each meal.  That was ordered.  Medication side effects: No  ROS: no complaints    Subjective: Patient was seen for continuing care and treatment.  Case was discussed with nursing staff.  On examination today, the patient is pleasant.  He is seen in the dining room being social with others.  He is asking if I could give him his insurance at every meal.  I did order that for him today.  Otherwise no new clinical issues or concerns were expressed by patient.  He is cooperative with meds and treatment.  Discharge planning and disposition is ongoing.  Per staff report, he may be looking at discharge on Monday.    Objective   Mental Status Evaluation:  Appearance:  age appropriate and casually dressed   Behavior:  Cooperative   Speech:  normal pitch and normal volume   Mood:  euthymic   Affect:  normal and appropriate to mood   Thought Process:  Goal oriented.   Associations: intact associations   Thought Content:  No overt delusions.   Perceptual Disturbances: None   Risk Potential: Suicidal Ideations none  Homicidal Ideations none  Potential for Aggression No   Sensorium:  person, place, time/date, and situation   Memory:  recent and remote memory grossly intact   Consciousness:  alert and awake    Attention: attention span and concentration were age appropriate   Insight:  limited   Judgment: limited   Gait/Station: normal gait/station   Motor Activity: no abnormal movements     Medications: current meds:   Current Facility-Administered Medications:     aluminum-magnesium hydroxide-simethicone (MAALOX) oral suspension 30 mL, Q4H PRN    buprenorphine-naloxone (Suboxone) film 8 mg, After Dinner    buprenorphine-naloxone (Suboxone) film 8 mg, Daily    Cholecalciferol (VITAMIN D3) tablet  2,000 Units, Daily    clotrimazole (LOTRIMIN) 1 % cream, BID    cyanocobalamin (VITAMIN B-12) tablet 1,000 mcg, Daily    famotidine (PEPCID) tablet 20 mg, BID PRN    FLUoxetine (PROzac) capsule 40 mg, Daily    haloperidol lactate (HALDOL) injection 5 mg, Q4H PRN Max 4/day    hydrOXYzine HCL (ATARAX) tablet 25 mg, Q6H PRN Max 4/day    hydrOXYzine HCL (ATARAX) tablet 50 mg, Q6H PRN Max 4/day    ibuprofen (MOTRIN) tablet 200 mg, Q6H PRN    ibuprofen (MOTRIN) tablet 400 mg, Q6H PRN    ibuprofen (MOTRIN) tablet 600 mg, Q8H PRN    melatonin tablet 3 mg, HS    methocarbamol (ROBAXIN) tablet 500 mg, Q8H PRN    nicotine polacrilex (NICORETTE) gum 4 mg, Q2H PRN    ondansetron (ZOFRAN-ODT) dispersible tablet 4 mg, Q8H PRN    pantoprazole (PROTONIX) EC tablet 40 mg, Early Morning    polyethylene glycol (MIRALAX) packet 17 g, Daily    propranolol (INDERAL) tablet 5 mg, Q8H PRN    QUEtiapine (SEROquel) tablet 200 mg, HS    QUEtiapine (SEROquel) tablet 50 mg, BID    risperiDONE (RisperDAL) tablet 0.25 mg, Q4H PRN Max 6/day    risperiDONE (RisperDAL) tablet 0.5 mg, Q4H PRN Max 3/day    risperiDONE (RisperDAL) tablet 1 mg, Q2H PRN Max 3/day    senna (SENOKOT) tablet 17.2 mg, HS    traZODone (DESYREL) tablet 50 mg, Q6H PRN Max 3/day.      Lab Results: I have reviewed the following results:  Most Recent Labs:   Lab Results   Component Value Date    WBC 3.95 (L) 10/02/2024    RBC 3.85 (L) 10/02/2024    HGB 12.4 10/02/2024    HCT 35.7 (L) 10/02/2024     10/02/2024    RDW 15.5 (H) 10/02/2024    NEUTROABS 2.38 10/02/2024    SODIUM 141 10/02/2024    K 3.9 10/02/2024     10/02/2024    CO2 33 (H) 10/02/2024    BUN 18 10/02/2024    CREATININE 0.73 10/02/2024    GLUC 98 10/02/2024    GLUF 87 05/03/2023    CALCIUM 9.2 10/02/2024    AST 67 (H) 10/02/2024    ALT 66 (H) 10/02/2024    ALKPHOS 80 10/02/2024    TP 6.8 10/02/2024    ALB 3.9 10/02/2024    TBILI 0.98 10/02/2024    CHOLESTEROL 167 10/02/2024    HDL 70 10/02/2024    TRIG 115  10/02/2024    LDLCALC 74 10/02/2024    NONHDLC 97 10/02/2024    AMMONIA 38 (H) 04/06/2014    OIC1KYRGPLUP 1.176 10/01/2024    FREET4 0.82 10/20/2015    T3FREE 2.22 (L) 10/20/2015    RPR Non-Reactive 03/17/2016    HGBA1C 4.2 09/22/2024    EAG 74 09/22/2024       I spent approximately 30 minutes with the treatment and care of this patient.  Time spent reviewing the patient's record, reviewing medications and completing the progress note.

## 2024-10-05 NOTE — PLAN OF CARE
Problem: Alteration in Thoughts and Perception  Goal: Verbalize thoughts and feelings  Description: Interventions:  - Promote a nonjudgmental and trusting relationship with the patient through active listening and therapeutic communication  - Assess patient's level of functioning, behavior and potential for risk  - Engage patient in 1 on 1 interactions  - Encourage patient to express fears, feelings, frustrations, and discuss symptoms    - [place] patient to reality, help patient recognize reality-based thinking   - Administer medications as ordered and assess for potential side effects  - Provide the patient education related to the signs and symptoms of the illness and desired effects of prescribed medications  Outcome: Progressing     Problem: Risk for Self Injury/Neglect  Goal: Refrain from harming self  Description: Interventions:  - Monitor patient closely, per order  - Develop a trusting relationship  - Supervise medication ingestion, monitor effects and side effects   Outcome: Progressing     Problem: Depression  Goal: Refrain from isolation  Description: Interventions:  - Develop a trusting relationship   - Encourage socialization   Outcome: Progressing  Goal: Refrain from self-neglect  Outcome: Progressing

## 2024-10-05 NOTE — NURSING NOTE
Patient was visible in the milieu with minimal peer interaction. Denies all psych s/s. No behaviors noted. No c/o pain. Had 100% for dinner. Took her medications. Safety checks ongoing.

## 2024-10-05 NOTE — NURSING NOTE
"Agustin Stubbs (50 y.o. Male)     PLEASE SEE ATTACHED FOR CONTINUED INPT STAY DAYS    REF #   [id number]    PLEASE CALL DESIREE SOLANO RN/ DEPT @ [phone]   OR [phone]    THANK YOU  DESIREE SOLANO RN  [facility]        Date of Birth   1972    Social Security Number       Address   [address] [place] [address]    Home Phone   [phone]    MRN   [id number]       [employer]   None    Marital Status                               Admission Date   9/27/23    Admission Type   Urgent    Admitting Provider   Lillie Lindquist MD    Attending Provider   Lillie Lindquist MD    Department, Room/Bed   [address], S515/1       Discharge Date       Discharge Disposition       Discharge Destination                                 Attending Provider: Lillie Lindquist MD    Allergies: Penicillins    Isolation: None   Infection: None   Code Status: CPR    Ht: 180.3 cm (70.98\")   Wt: 92.6 kg (204 lb 2.3 oz)    Admission Cmt: None   Principal Problem: Status epilepticus [G40.901]                   Active Insurance as of 9/27/2023       Primary Coverage       Payor Plan Insurance Group Employer/Plan Group    MISC MULTI PLAN MISC MULTIPLAN - [id number]       Coverage Address Coverage Phone Number Coverage Fax Number Effective Dates    [address] [phone]  1/1/2023 - None Entered    [facility] [id number]         Subscriber Name Subscriber Birth Date Member ID       AGUSTIN STUBBS 1972 [id number]                     Emergency Contacts        (Rel.) Home Phone Work Phone Mobile Phone    MAGI HUGGINS (Significant Other) [phone] -- [phone]              [place]: NPI [id number]  Tax ID [id number]  Medication Administration Report for Agustin Stubbs as of 10/04/23 1320     Legend:    Given Hold Not Given Due Canceled Entry Other Actions    Time Time (Time) Time Time-Action         Discontinued     Completed     Future     " "Patient is visible on the unit. He is labile with an irritable edge. Patient denies all psych s/s when asked and is requesting discharge stating \"Host said they'd have a bed for me today.\" Discharge process explained to patient and he verbalized understanding. Patient is medication compliant after encouragement as he attempted to refuse vitamins. Encouraged to inform staff of any needs or concerns.    " MAR Hold     Linked             Medications 10/03/23 10/04/23      acetaminophen (TYLENOL) tablet 650 mg  Dose: 650 mg  Freq: Every 4 Hours PRN Route: PO  PRN Reason: Fever  PRN Comment: fever greater than 100.4 °F or headache  Start: 09/27/23 1211   Admin Instructions:   If given for fever, use fever parameter: fever greater than 100.4 °F  Based on patient request - if ordered for moderate or severe pain, provider allows for administration of a medication prescribed for a lower pain scale.    Do not exceed 4 grams of acetaminophen in a 24 hr period. Max dose of 2gm for AST/ALT greater than 120 units/L.    If given for pain, use the following pain scale:   Mild Pain = Pain Score of 1-3, CPOT 1-2  Moderate Pain = Pain Score of 4-6, CPOT 3-4  Severe Pain = Pain Score of 7-10, CPOT 5-8    2053-Given [C]               Or  acetaminophen (TYLENOL) suppository 650 mg  Dose: 650 mg  Freq: Every 4 Hours PRN Route: RE  PRN Reasons: Mild Pain,Fever  PRN Comment: temperature greater than 100.4 °F  Start: 09/27/23 1211   Admin Instructions:   If given for fever, use fever parameter: fever greater than 100.4 °F  Based on patient request - if ordered for moderate or severe pain, provider allows for administration of a medication prescribed for a lower pain scale.    Do not exceed 4 grams of acetaminophen in a 24 hr period. Max dose of 2gm for AST/ALT greater than 120 units/L.    If given for pain, use the following pain scale:   Mild Pain = Pain Score of 1-3, CPOT 1-2  Moderate Pain = Pain Score of 4-6, CPOT 3-4  Severe Pain = Pain Score of 7-10, CPOT 5-8    2053-Not Given:  See Alt                sennosides-docusate (PERICOLACE) 8.6-50 MG per tablet 2 tablet  Dose: 2 tablet  Freq: 2 Times Daily Route: PO  Start: 09/27/23 1300   Admin Instructions:   HOLD MEDICATION IF PATIENT HAS HAD BOWEL MOVEMENT. Start bowel management regimen if patient has not had a bowel movement after 12 hours.    (0813)-Not Given     2043-Given            "(1250)-Not Given     2100             And  polyethylene glycol (MIRALAX) packet 17 g  Dose: 17 g  Freq: Daily PRN Route: PO  PRN Reason: Constipation  PRN Comment: Use if senna-docusate is ineffective  Start: 09/27/23 1207   Admin Instructions:   Use if no bowel movement after 12 hours. Mix in 6-8 ounces of water.  Use 4-8 ounces of water, tea, or juice for each 17 gram dose.        And  bisacodyl (DULCOLAX) EC tablet 5 mg  Dose: 5 mg  Freq: Daily PRN Route: PO  PRN Reason: Constipation  PRN Comment: Use if polyethylene glycol is ineffective  Start: 09/27/23 1207   Admin Instructions:   Use if no bowel movement after 12 hours.  Swallow whole. Do not crush, split, or chew tablet.        And  bisacodyl (DULCOLAX) suppository 10 mg  Dose: 10 mg  Freq: Daily PRN Route: RE  PRN Reason: Constipation  PRN Comment: Use if bisacodyl oral is ineffective  Start: 09/27/23 1207   Admin Instructions:   Use if no bowel movement after 12 hours.  Hold for diarrhea         Calcium Replacement - Follow Nurse / BPA Driven Protocol  Freq: As Needed Route: XX  PRN Reason: Other  Start: 09/27/23 1211   Admin Instructions:   Open Order & Select \"[facility] Electrolyte Replacement Protocol Algorithm\" to View Details         Enoxaparin Sodium (LOVENOX) syringe 40 mg  Dose: 40 mg  Freq: Every 24 Hours Route: SC  Indications of Use: PROPHYLAXIS OF VENOUS THROMBOEMBOLISM  Start: 09/27/23 1330   Admin Instructions:   Give subcutaneous in abdomen only. Do not massage site after injection.    1255-Given            1240-Given               folic acid 1 mg in sodium chloride 0.9 % 50 mL IVPB  Dose: 1 mg  Freq: Daily Route: IV  Start: 09/27/23 1400   Admin Instructions:   Protect from light.    1008-New Bag            0934-New Bag               lisinopril (PRINIVIL,ZESTRIL) tablet 10 mg  Dose: 10 mg  Freq: Every 24 Hours Scheduled Route: PO  Start: 09/29/23 1015    0810-Given            1243-Given              And  hydroCHLOROthiazide (HYDRODIURIL) tablet " "12.5 mg  Dose: 12.5 mg  Freq: Every 24 Hours Scheduled Route: PO  Start: 09/29/23 1015   Admin Instructions:   Caution: Look alike/sound alike drug alert    0809-Given            1239-Given               labetalol (NORMODYNE,TRANDATE) injection 20 mg  Dose: 20 mg  Freq: Every 10 Minutes PRN Route: IV  PRN Reason: High Blood Pressure  PRN Comment: Below 180 mmHg systolic  Start: 09/27/23 1211   Admin Instructions:   Maintain Systolic Blood Pressure Below 180  If Goal SBP Not Met After 3 Doses, Notify Provider  Give IV Push over 2 minutes.         levETIRAcetam (KEPPRA) tablet 1,000 mg  Dose: 1,000 mg  Freq: Every 12 Hours Scheduled Route: PO  Start: 10/01/23 1100   Admin Instructions:   Mucous membrane irritant. Do not crush or chew tablet or capsule unless administered through a feeding tube.  Caution: Look alike/sound alike drug alert.    0807-Given     2043-Given           0825-Not Given:  See Alt     2100             Or  levETIRAcetam (KEPPRA) injection 1,000 mg  Dose: 1,000 mg  Freq: Every 12 Hours Scheduled Route: IV  Start: 10/01/23 1100   Admin Instructions:   Caution: Look alike/sound alike drug alert.    *When giving as IV push: Do not exceed 1000 mg/min.*      0807-Not Given:  See Alt     2043-Not Given:  See Alt           0825-Given     2100              LORazepam (ATIVAN) tablet 2 mg  Dose: 2 mg  Freq: Every 6 Hours PRN Route: PO  PRN Reason: Withdrawal  PRN Comment: Given for diastolic blood pressure greater than 95 and/or heart rate greater than 95.  Do not give for subjective anxiety or based on CIWA score.  Start: 10/04/23 1235   End: 10/11/23 1234   Admin Instructions:    Caution: Look alike/sound alike drug alert         Magnesium Standard Dose Replacement - Follow Nurse / BPA Driven Protocol  Freq: As Needed Route: XX  PRN Reason: Other  Start: 09/27/23 1211   Admin Instructions:   Open Order & Select \"[facility]HS Electrolyte Replacement Protocol Algorithm\" to View Details         metoprolol succinate XL " (TOPROL-XL) 24 hr tablet 25 mg  Dose: 25 mg  Freq: Every 24 Hours Scheduled Route: PO  Start: 09/30/23 1530   Admin Instructions:   Hold for SBP less than 100, DBP less than 60, or heart rate less than 50    Do not crush or chew the capsules or tablets. The drug may not work as designed if the capsule or tablet is crushed or chewed. Swallow whole.      Do not crush or chew.    0807-Given            1239-Given               metoprolol tartrate (LOPRESSOR) injection 5 mg  Dose: 5 mg  Freq: Every 6 Hours PRN Route: IV  PRN Reasons: High Blood Pressure,Heart Rate  PRN Comment: if unable to take PO BB  Start: 10/01/23 1004   Admin Instructions:   Hold for SBP less than 100, DBP less than 60, or heart rate less than 50         multivitamin with minerals 1 tablet  Dose: 1 tablet  Freq: Daily Route: PO  Start: 09/27/23 1400   Admin Instructions:       0807-Given            1239-Given               nicotine (NICODERM CQ) 21 MG/24HR patch 1 patch  Dose: 1 patch  Freq: Every 24 Hours Route: TD  Start: 09/27/23 1400   Admin Instructions:   Apply patch to clean, dry, hairless area daily. Remove old patch before applying new patch.   Rotate patch site daily. May remove patch at bedtime if needed to prevent insomnia. Do not use other nicotine products. At discharge, follow instructions on package.  Dispose of nicotine replacement therapies and their wrappers in non-hazardous pharmaceutical waste or in regular trash.    2043-Medication Removed     2044-Medication Applied           2044 (Medication Removed)     2100              nitroglycerin (NITROSTAT) SL tablet 0.4 mg  Dose: 0.4 mg  Freq: Every 5 Minutes PRN Route: SL  PRN Reason: Chest Pain  PRN Comment: Only if SBP Greater Than 100  Start: 09/27/23 1207   Admin Instructions:   If Pain Unrelieved After 3 Doses Notify MD  May administer up to 3 doses per episode.         OLANZapine (zyPREXA) injection 5 mg  Dose: 5 mg  Freq: Every 8 Hours PRN Route: IM  PRN Reason:  "Agitation  Start: 10/04/23 0726   Admin Instructions:   Dissolve the contents of the vial using 2.1 mL of Sterile Water for Injection for approximate concentration of 5 mg/mL. Caution: Look alike/sound alike drug alert.         OLANZapine (zyPREXA) tablet 5 mg  Dose: 5 mg  Freq: Nightly Route: PO  Start: 10/03/23 2100   Admin Instructions:   Caution: Look alike/sound alike drug alert    2043-Given            2100               ondansetron (ZOFRAN) injection 4 mg  Dose: 4 mg  Freq: Every 6 Hours PRN Route: IV  PRN Reasons: Nausea,Vomiting  Start: 09/27/23 1212   Admin Instructions:   If BOTH ondansetron (ZOFRAN) & promethazine (PHENERGAN) Ordered, Use ondansetron First & THEN promethazine IF ondansetron Ineffective.         Phosphorus Replacement - Follow Nurse / BPA Driven Protocol  Freq: As Needed Route: XX  PRN Reason: Other  Start: 09/27/23 1211   Admin Instructions:   Open Order & Select \"BHS Electrolyte Replacement Protocol Algorithm\" to View Details         Potassium Replacement - Follow Nurse / BPA Driven Protocol  Freq: As Needed Route: XX  PRN Reason: Other  Start: 09/27/23 1211   Admin Instructions:   Open Order & Select \"BHS Electrolyte Replacement Protocol Algorithm\" to View Details         sodium chloride 0.9 % flush 10 mL  Dose: 10 mL  Freq: As Needed Route: IV  PRN Reason: Line Care  Start: 09/27/23 1207         sodium chloride 0.9 % flush 10 mL  Dose: 10 mL  Freq: Every 12 Hours Scheduled Route: IV  Start: 09/27/23 1300    0813-Given     2045-Given           1240-Given     2100              sodium chloride 0.9 % infusion 40 mL  Dose: 40 mL  Freq: As Needed Route: IV  PRN Reason: Line Care  Start: 09/27/23 1207   Admin Instructions:   Following administration of an IV intermittent medication, flush line with 40mL NS at 100mL/hr.         thiamine (B-1) injection 200 mg  Dose: 200 mg  Freq: Every 8 Hours Scheduled Route: IV  Start: 09/27/23 1400   End: 10/02/23 0843   Admin Instructions:   Doses of up " to 250mg, give over 1-2 minutes (IV push). Doses 250mg and above, give over 30 minutes.        Followed by  thiamine (VITAMIN B-1) tablet 100 mg  Dose: 100 mg  Freq: Daily Route: PO  Start: 10/02/23 1400    0807-Given            1239-Given              Completed Medications  Medications 10/03/23 10/04/23       gadobenate dimeglumine (MULTIHANCE) injection 20 mL  Dose: 20 mL  Freq: Once in Imaging Route: IV  Start: 09/30/23 2045   End: 09/30/23 1954   Admin Instructions:   Vesicant; admin as rapid bolus; flush with 5 mL NS after admin or 20 mL for renal or aortoiliofemoral vasculature         LORazepam (ATIVAN) injection 2 mg  Dose: 2 mg  Freq: Once Route: IV  Start: 09/30/23 1500   End: 09/30/23 1529   Admin Instructions:   Please give the Ativan 10 minutes before getting the MRI.   Caution: Look alike/sound alike drug alert. Dilute 1:1 with normal saline.         OLANZapine (zyPREXA) injection 5 mg  Dose: 5 mg  Freq: Once Route: IM  Start: 10/03/23 1315   End: 10/03/23 1256   Admin Instructions:   Dissolve the contents of the vial using 2.1 mL of Sterile Water for Injection for approximate concentration of 5 mg/mL. Caution: Look alike/sound alike drug alert.    1256-Given                PHENobarbital injection 65 mg  Dose: 65 mg  Freq: Once Route: IV  Start: 10/01/23 1700   End: 10/01/23 1644   Admin Instructions:   Give IV push over 1.5 to 2 minutes (IV push max rate 50 mg/min).          Followed by  PHENobarbital injection 65 mg  Dose: 65 mg  Freq: Once Route: IV  Start: 10/02/23 0452   End: 10/02/23 0602   Admin Instructions:   Give IV push over 1.5 to 2 minutes (IV push max rate 50 mg/min).          Followed by  PHENobarbital (LUMINAL) tablet 32.4 mg  Dose: 32.4 mg  Freq: Once Route: PO  Start: 10/02/23 1652   End: 10/02/23 1808   Admin Instructions:           Followed by  PHENobarbital (LUMINAL) tablet 32.4 mg  Dose: 32.4 mg  Freq: Once Route: PO  Start: 10/03/23 0452   End: 10/03/23 0641   Admin Instructions:        0641-Given               Followed by  PHENobarbital (LUMINAL) tablet 32.4 mg  Dose: 32.4 mg  Freq: Once Route: PO  Start: 10/03/23 1852   End: 10/03/23 1813   Admin Instructions:       1813-Given                PHENobarbital 150.8 mg in sodium chloride 0.9 % 100 mL IVPB  Dose: 2 mg/kg  Weight Dosing Info: 75.3 kg (Ideal)  Freq: Once Route: IV  Start: 09/30/23 2345   End: 09/30/23 2355        Followed by  PHENobarbital 150.8 mg in sodium chloride 0.9 % 100 mL IVPB  Dose: 2 mg/kg  Weight Dosing Info: 75.3 kg (Ideal)  Freq: Once Route: IV  Start: 10/01/23 0237   End: 10/04/23 0731        Followed by  PHENobarbital 150.8 mg in sodium chloride 0.9 % 100 mL IVPB  Dose: 2 mg/kg  Weight Dosing Info: 75.3 kg (Ideal)  Freq: Once Route: IV  Start: 10/01/23 0537   End: 10/01/23 0500        Discontinued Medications  Medications 10/03/23 10/04/23       chlorhexidine (PERIDEX) 0.12 % solution 15 mL  Dose: 15 mL  Freq: Every 12 Hours Scheduled Route: MT  Start: 09/27/23 1300   End: 09/28/23 2048   Admin Instructions:   Use Chlorhexidine Provided in Oral Care Kit & Swab Mouth As Directed   Do not swallow.         dexmedetomidine (PRECEDEX) 400 mcg in 100 mL NS infusion  Rate: 4.7-35 mL/hr Dose: 0.2-1.5 mcg/kg/hr  Weight Dosing Info: 93.2 kg  Freq: Titrated Route: IV  Start: 09/27/23 1300   End: 09/28/23 1413   Admin Instructions:   Begin infusion at 0.2 mcg/kg/hr and titrate up or down by 0.1-0.2 mcg/kg/hr every 15 minutes to maintain Target Arousal Level (RASS). Notify MD if not at RASS goal and requiring 1.5 mcg/kg/hr or heart rate is less than 50 beats per minute or MAP is less than 60 mmHg.   Order specific questions:   Titration Indication: Sedation  RASS Goal: -1 TO -2           famotidine (PEPCID) injection 20 mg  Dose: 20 mg  Freq: 2 Times Daily Route: IV  Start: 09/27/23 1300   End: 10/01/23 0745   Admin Instructions:   Give IV push over 2 minutes.         fentaNYL (SUBLIMAZE) bolus from bag 10 mcg/mL 50 mcg  Dose: 50  mcg  Freq: Every 30 Minutes PRN Route: IV  PRN Reason: Severe Pain  PRN Comment: for ventilator asynchrony or agitation to maintain RASS ordered  Start: 09/27/23 1209   End: 09/28/23 1413   Admin Instructions:   Maximum of 3 doses in 2 hours  If given for pain, use the following pain scale:  Mild Pain = Pain Score of 1-3, CPOT 1-2  Moderate Pain = Pain Score of 4-6, CPOT 3-4  Severe Pain = Pain Score of 7-10, CPOT 5-8         fentaNYL 1000 mcg in 100 mL NS infusion  Rate: 5-30 mL/hr Dose:  mcg/hr  Freq: Titrated Route: IV  Start: 09/27/23 1300   End: 09/28/23 1413   Admin Instructions:   Start infusion at 50 mcg/hr and titrate up or down by 12.5 - 50 mcg/hr every 5 - 10 minutes for NRS 7-10 or CPOT 5-8. Max. dose 300 mcg/hr.  Caution: Look alike/sound alike drug alert    If given for pain, use the following pain scale:  Mild Pain = Pain Score of 1-3, CPOT 1-2  Moderate Pain = Pain Score of 4-6, CPOT 3-4  Severe Pain = Pain Score of 7-10, CPOT 5-8   Order specific questions:   Titration Indication: Sedation           fentaNYL citrate (PF) (SUBLIMAZE) injection 50 mcg  Dose: 50 mcg  Freq: Every 30 Minutes PRN Route: IV  PRN Reason: Severe Pain  Start: 09/27/23 1209   End: 09/28/23 1413   Admin Instructions:   Use filter needle to withdraw dose from ampule. Based on patient request - if ordered for moderate or severe pain, provider allows for administration of a medication prescribed for a lower pain scale.  If given for pain, use the following pain scale:  Mild Pain = Pain Score of 1-3, CPOT 1-2  Moderate Pain = Pain Score of 4-6, CPOT 3-4  Severe Pain = Pain Score of 7-10, CPOT 5-8         levETIRAcetam (KEPPRA) injection 1,000 mg  Dose: 1,000 mg  Freq: Every 12 Hours Scheduled Route: IV  Start: 09/27/23 1300   End: 10/01/23 0745   Admin Instructions:   Caution: Look alike/sound alike drug alert.    *When giving as IV push: Do not exceed 1000 mg/min.*           levETIRAcetam (KEPPRA) tablet 1,000 mg  Dose:  1,000 mg  Freq: Every 12 Hours Scheduled Route: PO  Start: 10/01/23 0900   End: 10/01/23 1003   Admin Instructions:   Mucous membrane irritant. Do not crush or chew tablet or capsule unless administered through a feeding tube.      For tube route administration disperse crushed tablets in 10 mL of water, shake for 5 minutes to dissolve, and administer immediately via enteral feeding tube.  Caution: Look alike/sound alike drug alert.         LORazepam (ATIVAN) tablet 1 mg  Dose: 1 mg  Freq: Every 1 Hour PRN Route: PO  PRN Reason: Withdrawal  PRN Comment: For CIWA-Ar 8-10  Start: 09/27/23 1213   End: 10/04/23 1212   Admin Instructions:   Reassess 1 Hour After Administration   Caution: Look alike/sound alike drug alert    0037-Not Given:  See Alt     0221-Not Given:  See Alt     0807-Not Given:  See Alt     0850-Not Given:  See Alt     1138-Not Given:  See Alt       1725-Not Given:  See Alt     1821-Not Given:  See Alt     1949-Not Given:  See Alt     2043-Not Given:  See Alt         0333-Not Given:  See Alt              Or  LORazepam (ATIVAN) injection 1 mg  Dose: 1 mg  Freq: Every 1 Hour PRN Route: IV  PRN Reason: Withdrawal  PRN Comment: For CIWA-Ar 8-10  Start: 09/27/23 1213   End: 10/04/23 1212   Admin Instructions:   Reassess 1 Hour After Administration   Caution: Look alike/sound alike drug alert. Dilute 1:1 with normal saline.    0037-Not Given:  See Alt     0221-Not Given:  See Alt     0807-Not Given:  See Alt     0850-Not Given:  See Alt     1138-Not Given:  See Alt       1725-Not Given:  See Alt     1821-Not Given:  See Alt     1949-Not Given:  See Alt     2043-Not Given:  See Alt         0333-Not Given:  See Alt              Or  LORazepam (ATIVAN) tablet 2 mg  Dose: 2 mg  Freq: Every 1 Hour PRN Route: PO  PRN Reason: Withdrawal  PRN Comment: For CIWA-Ar 11-15 or  -160, DBP , or -125  Start: 09/27/23 1213   End: 10/04/23 1212   Admin Instructions:   Reassess 1 Hour After Administration    Caution: Look alike/sound alike drug alert    0037-Not Given:  See Alt     0221-Not Given:  See Alt     0807-Not Given:  See Alt     0850-Not Given:  See Alt     1138-Not Given:  See Alt       1725-Not Given:  See Alt     1821-Not Given:  See Alt     1949-Not Given:  See Alt     2043-Not Given:  See Alt         0333-Not Given:  See Alt              Or  LORazepam (ATIVAN) injection 2 mg  Dose: 2 mg  Freq: Every 1 Hour PRN Route: IV  PRN Reason: Withdrawal  PRN Comment: 11-15 or  -160, DBP , or -125  Start: 09/27/23 1213   End: 10/04/23 1212   Admin Instructions:   Reassess 1 Hour After Administration   Caution: Look alike/sound alike drug alert. Dilute 1:1 with normal saline.    0037-Not Given:  See Alt     0221-Not Given:  See Alt     0807-Not Given:  See Alt     0850-Given     1138-Given       1725-Given     1821-Given     1949-Not Given:  See Alt     2043-Not Given:  See Alt         0333-Given              Or  LORazepam (ATIVAN) injection 2 mg  Dose: 2 mg  Freq: Every 15 Minutes PRN Route: IV  PRN Reason: Withdrawal  PRN Comment: For CIWA-Ar Greater Than 15 or SBP >160, DBP >110, or HR >125. Repeat Dose in 15 Minutes if CIWA-Ar Does Not Decrease  Start: 09/27/23 1213   End: 10/04/23 1212   Admin Instructions:   Reassess 15 Minutes After Each Administration.  If CIWA-Ar Remains Greater Than 15 1 Hour After Administration of 2 IV Doses, Administer LORazepam (ATIVAN) 4 mg oral or IV & Reassess Every Hour   Caution: Look alike/sound alike drug alert. Dilute 1:1 with normal saline.    0037-Not Given:  See Alt     0221-Not Given:  See Alt     0807-Given     0850-Not Given:  See Alt     1138-Not Given:  See Alt       1725-Not Given:  See Alt     1821-Not Given:  See Alt     1949-Given     2043-Given         0333-Not Given:  See Alt              Or  LORazepam (ATIVAN) injection 2 mg  Dose: 2 mg  Freq: Every 15 Minutes PRN Route: IM  PRN Reason: Withdrawal  PRN Comment: If Unable to Administer IV - For  CIWA-Ar Greater Than 15 or SBP >160, DBP >110, or HR >125. Repeat Dose in 15 Minutes if CIWA-Ar Does Not Decrease Repeat Dose in 15 Minutes if CIWA-Ar Does Not Decrease  Start: 09/27/23 1213   End: 10/04/23 1212   Admin Instructions:   Reassess 15 Minutes After Each Administration.  If CIWA-Ar Remains Greater Than 15 1 Hour After Administration of 2 IV Doses, Administer LORazepam (ATIVAN) 4 mg oral or IV & Reassess Every Hour   Caution: Look alike/sound alike drug alert. Dilute 1:1 with normal saline.    0037-Given     0221-Given     0807-Not Given:  See Alt     0850-Not Given:  See Alt     1138-Not Given:  See Alt       1725-Not Given:  See Alt     1821-Not Given:  See Alt     1949-Not Given:  See Alt     2043-Not Given:  See Alt         0333-Not Given:  See Alt              Or  LORazepam (ATIVAN) tablet 4 mg  Dose: 4 mg  Freq: Every 1 Hour PRN Route: PO  PRN Reason: Withdrawal  PRN Comment: For CIWA-Ar Greater Than 15 After 2 Doses of 2 mg IV/IM.  Repeat Dose in 1 Hour if CIWA-Ar Does Not Decrease  Start: 09/27/23 1213   End: 10/04/23 1212   Admin Instructions:   Reassess 1 Hour After Administration   Caution: Look alike/sound alike drug alert    0037-Not Given:  See Alt     0221-Not Given:  See Alt     0807-Not Given:  See Alt     0850-Not Given:  See Alt     1138-Not Given:  See Alt       1725-Not Given:  See Alt     1821-Not Given:  See Alt     1949-Not Given:  See Alt     2043-Not Given:  See Alt         0333-Not Given:  See Alt              Or  LORazepam (ATIVAN) injection 4 mg  Dose: 4 mg  Freq: Every 1 Hour PRN Route: IV  PRN Reason: Withdrawal  PRN Comment: If Unable to Administer IV - For CIWA-Ar Greater Than 15 After 2 Doses of 2 mg IV/IM.  Repeat Dose in 1 Hour if CIWA-Ar Does Not Decrease  Start: 09/27/23 1213   End: 10/04/23 1212   Admin Instructions:   Reassess 1 Hour After Administration   Caution: Look alike/sound alike drug alert. Dilute 1:1 with normal saline.    0037-Not Given:  See Alt      0221-Not Given:  See Alt     0807-Not Given:  See Alt     0850-Not Given:  See Alt     1138-Not Given:  See Alt       1725-Not Given:  See Alt     1821-Not Given:  See Alt     1949-Not Given:  See Alt     2043-Not Given:  See Alt         0333-Not Given:  See Alt               LORazepam (ATIVAN) injection 2 mg  Dose: 2 mg  Freq: Every 6 Hours Route: IV  Start: 09/27/23 1300   End: 09/28/23 1259   Admin Instructions:    Caution: Look alike/sound alike drug alert. Dilute 1:1 with normal saline.        Followed by  LORazepam (ATIVAN) injection 1 mg  Dose: 1 mg  Freq: Every 6 Hours Route: IV  Start: 09/28/23 1300   End: 09/30/23 1259   Admin Instructions:    Caution: Look alike/sound alike drug alert. Dilute 1:1 with normal saline.         Pharmacy to Dose enoxaparin (LOVENOX)  Freq: Continuous PRN Route: XX  PRN Reason: Consult  Indications of Use: PROPHYLAXIS OF VENOUS THROMBOEMBOLISM  Start: 09/27/23 1208   End: 09/27/23 1239   Order specific questions:   Indication of use Prophylaxis           propofol (DIPRIVAN) infusion 10 mg/mL 100 mL  Rate: 2.8-27.96 mL/hr Dose: 5-50 mcg/kg/min  Weight Dosing Info: 93.2 kg  Freq: Titrated Route: IV  Start: 09/27/23 1300   End: 09/28/23 1413   Admin Instructions:   Begin infusion at 5 mcg/kg/min and titrate up or down by 5 - 10 mcg/kg/min every 3 - 5 minutes to maintain Target Arousal Level (RASS). Notify MD if not at goal and requiring more than 50 mcg/kg/min. Infuse into dedicated line, change vial and tube every 12 hours. Patient must be intubated.   Order specific questions:   Titration Indication: Sedation           sodium chloride 0.9 % infusion  Rate: 100 mL/hr Dose: 100 mL/hr  Freq: Continuous Route: IV  Start: 09/27/23 1300   End: 09/30/23 1410                       Physician Progress Notes (last 72 hours)        Sheng Hyatt MD at 10/04/23 3062            Dedicated to [facility]    [phone]   LOS: 7 days     Name: Agustin Hawk  Age/Sex: 50 y.o.  male  :  1972        PCP: Indy Moffett APRN  No chief complaint on file.     Subjective   Per nurse and sitter we had a rough night.  He received about 16 mg of IV ativan and received IM zyprexa x1 and one oral scheduled dose of zyprexa.    Unable to assess review of systems    enoxaparin, 40 mg, Subcutaneous, Q24H  folic acid (FOLVITE) IVPB, 1 mg, Intravenous, Daily  lisinopril, 10 mg, Oral, Q24H   And  hydroCHLOROthiazide, 12.5 mg, Oral, Q24H  levETIRAcetam, 1,000 mg, Oral, Q12H   Or  levETIRAcetam, 1,000 mg, Intravenous, Q12H  metoprolol succinate XL, 25 mg, Oral, Q24H  multivitamin with minerals, 1 tablet, Oral, Daily  nicotine, 1 patch, Transdermal, Q24H  OLANZapine, 5 mg, Oral, Nightly  PHENobarbital, 2 mg/kg (Ideal), Intravenous, Once  senna-docusate sodium, 2 tablet, Oral, BID  sodium chloride, 10 mL, Intravenous, Q12H  thiamine, 100 mg, Oral, Daily           Objective   Vital Signs  Temp:  [97.7 °F (36.5 °C)-98.4 °F (36.9 °C)] 97.7 °F (36.5 °C)  Heart Rate:  [] 95  Resp:  [18-20] 20  BP: (102-133)/() 102/77  Body mass index is 28.49 kg/m².    Intake/Output Summary (Last 24 hours) at 10/4/2023 0717  Last data filed at 10/3/2023 2101  Gross per 24 hour   Intake --   Output 650 ml   Net -650 ml         Physical Exam  Vitals and nursing note reviewed.   Constitutional:       General: He is not in acute distress.     Appearance: He is obese. He is ill-appearing.   Cardiovascular:      Rate and Rhythm: Normal rate and regular rhythm.   Pulmonary:      Effort: No respiratory distress.      Breath sounds: Normal breath sounds.   Abdominal:      General: Bowel sounds are normal. There is no distension.      Palpations: Abdomen is soft.   Neurological:      Mental Status: He is alert.      Comments: Lethargic and minimally responsive         Results Review:       I reviewed the patient's new clinical results.  Results from last 7 days   Lab Units 23  4552 23  1642   WBC 10*3/mm3  10.52 11.33*   HEMOGLOBIN g/dL 13.6 14.8   PLATELETS 10*3/mm3 208 226       Results from last 7 days   Lab Units 10/03/23  1232 09/28/23  0914 09/27/23  1642   SODIUM mmol/L 142 139 145   POTASSIUM mmol/L 3.7 3.8 3.5   CHLORIDE mmol/L 107 108* 111*   CO2 mmol/L 23.8 21.8* 21.8*   BUN mg/dL 16 12 13   CREATININE mg/dL 0.99 0.95 0.88   CALCIUM mg/dL 9.6 8.4* 8.2*     Estimated Creatinine Clearance: 106.1 mL/min (by C-G formula based on SCr of 0.99 mg/dL).      Assessment & Plan   Active Hospital Problems    Diagnosis  POA    **Status epilepticus [G40.901]  Yes    TBI (traumatic brain injury) [S06.9XAA]  Unknown    Delirium, acute [R41.0]  Unknown    Alcohol withdrawal [F10.939]  Unknown    Hypoxia [R09.02]  Unknown    Essential hypertension [I10]  Yes      Resolved Hospital Problems   No resolved problems to display.       PLAN  49yo gentleman with TBI, tobacco abuse, alcohol abuse, and HTN, who was transferred to [facility] ICU from [facility] with status epilepticus and acute resp failure. We were asked to assume care when pt came out of ICU 9/28.   -continue keppra with IV or po dosing as he is able to tolerate which ever route is needed.  -Continue phenobarbital protocol for his alcohol withdrawal.  Continue to monitor CIWA scores; he completed the phenobarb yesterday and still utilizing significant ativan usage.  On the day of his initial changes concerning for etoh withdrawal he required over 20mg of ativan and ended up in violent restraints.    -He has a pattern of delirium with times he is appropriate and other times is very difficult to redirect.  Ativan currently mainly being used from the CIWA protocol but this may not be the best treatment for him.  Will ask psychiatry to evaluate today and weigh in on medications for impulsivity and behavior issues.  Im not really even 100% sure that we are treat alcohol withdrawal at this point vs severe delirium form his seizure and prolonged hospital stay with history of  TBI   >The IM zyprexa seemed to help some yesterday but the po dose last night was ineffective with him having additional behavior issues overnight.  Continue soft restraints as deemed necessary from the nursing staff but continue to assess for removal.  -there was some concern regarding WK causing his issues this am by the nursing stf but he did receive Thiamine 200mg Q8 for 5days and remains on po ativan at this time  -Renal function electrolytes stable, CMP reveiwed  -He has this lump on the back of his head.  It looks to be soft tissue on the MRI.  Its not commented on by the radiologist.  Plan outpatient referral as this isnt a pressing matter acutely  -Lovenox for DVT prophylaxis  -Full code      Disposition  Expected Discharge Date: 10/5/2023; Expected Discharge Time:        Sheng Hyatt MD  [facility]  10/04/23  07:17 EDT        Electronically signed by Sheng Hyatt MD at 10/04/23 9672       Sheng Hyatt MD at 10/03/23 2169            [facility] to [facility]    [phone]   LOS: 6 days     Name: Agustin Stubbs  Age/Sex: 50 y.o. male  :  1972        PCP: Indy Moffett APRN  No chief complaint on file.     Subjective   He is pretty agitated this afterenoon and despite multiple IV ativan pushes he is pulling out the IVs and trying to get out of bed. per nursing has been very difficult to redirect and keep in the bed and he remains very unsteady when up.    Unable to assess review of systems    enoxaparin, 40 mg, Subcutaneous, Q24H  folic acid (FOLVITE) IVPB, 1 mg, Intravenous, Daily  lisinopril, 10 mg, Oral, Q24H   And  hydroCHLOROthiazide, 12.5 mg, Oral, Q24H  levETIRAcetam, 1,000 mg, Oral, Q12H   Or  levETIRAcetam, 1,000 mg, Intravenous, Q12H  metoprolol succinate XL, 25 mg, Oral, Q24H  multivitamin with minerals, 1 tablet, Oral, Daily  nicotine, 1 patch, Transdermal, Q24H  PHENobarbital, 32.4 mg, Oral, Once  PHENobarbital, 2 mg/kg (Ideal),  Intravenous, Once  senna-docusate sodium, 2 tablet, Oral, BID  sodium chloride, 10 mL, Intravenous, Q12H  thiamine, 100 mg, Oral, Daily           Objective   Vital Signs  Temp:  [97.5 °F (36.4 °C)-99 °F (37.2 °C)] 98.1 °F (36.7 °C)  Heart Rate:  [103-128] 108  Resp:  [18-20] 20  BP: (114-139)/() 114/83  Body mass index is 28.49 kg/m².    Intake/Output Summary (Last 24 hours) at 10/3/2023 1159  Last data filed at 10/2/2023 2300  Gross per 24 hour   Intake 240 ml   Output 600 ml   Net -360 ml         Physical Exam  Vitals and nursing note reviewed.   Constitutional:       General: He is not in acute distress.     Appearance: He is obese. He is ill-appearing.   Cardiovascular:      Rate and Rhythm: Normal rate and regular rhythm.   Pulmonary:      Effort: No respiratory distress.      Breath sounds: Normal breath sounds.   Abdominal:      General: Bowel sounds are normal. There is no distension.      Palpations: Abdomen is soft.   Neurological:      Mental Status: He is alert.      Comments: Lethargic and minimally responsive         Results Review:       I reviewed the patient's new clinical results.  Results from last 7 days   Lab Units 09/29/23  0338 09/27/23  1642 09/27/23  0442   WBC 10*3/mm3 10.52 11.33* 16.21*   HEMOGLOBIN g/dL 13.6 14.8 16.9   PLATELETS 10*3/mm3 208 226 362       Results from last 7 days   Lab Units 09/28/23  0914 09/27/23  1642 09/27/23  0609 09/27/23  0444 09/27/23  0442   SODIUM mmol/L 139 145  --   --  140   SODIUM, ARTERIAL mmol/L  --   --  141.4 143.5  --    POTASSIUM mmol/L 3.8 3.5  --   --  4.1   CHLORIDE mmol/L 108* 111*  --   --  99   CO2 mmol/L 21.8* 21.8*  --   --  5.3*   BUN mg/dL 12 13  --   --  12   CREATININE mg/dL 0.95 0.88  --   --  1.25   CALCIUM mg/dL 8.4* 8.2*  --   --  9.8   MAGNESIUM mg/dL  --   --   --   --  2.4     Estimated Creatinine Clearance: 110.6 mL/min (by C-G formula based on SCr of 0.95 mg/dL).      Assessment & Plan   Active Hospital Problems     Diagnosis  POA    **Status epilepticus [G40.901]  Yes    Alcohol withdrawal [F10.939]  Unknown    Hypoxia [R09.02]  Unknown    Essential hypertension [I10]  Yes      Resolved Hospital Problems   No resolved problems to display.       PLAN  49yo gentleman with TBI, tobacco abuse, alcohol abuse, and HTN, who was transferred to [facility] ICU from [facility] with status epilepticus and acute resp failure. We were asked to assume care when pt came out of ICU 9/28.   -continue keppra.  -Continue phenobarbital protocol for his alcohol withdrawal.  Continue to monitor CIWA scores; he finishes the phenobarbital today with last dose this evening.  I still question if this is ETOH withdrawal given the stability in his VS.  He only gets hypertensive and tachy when he is agitated.    -He has a pattern of delirium with times he is appropriate and other times is very difficult to redirect.  Ativan currently mainly being used from the CIWA protocol but this may not be the best treatment for him.  Will ask psychiatry to evaluate today and weigh in on medications for impulsivity and behavior issues.     >In the meantime I'm going to give a IM dose of zyprexa for now and order a siter to try very hard to keep him out of restraints.  Will order Qhs zyprexa as well.    -Renal function electrolytes stable, CMP ordered today  -He has this lump on the back of his head.  It looks to be soft tissue on the MRI.  Its not commented on by the radiologist.  Plan outpatient referral as this isnt a pressing matter acutely  -Lovenox for DVT prophylaxis  -Full code    FMLA paperwork returned  Disposition  Expected Discharge Date: 10/5/2023; Expected Discharge Time:        Sheng Hyatt MD  [facility]  10/03/23  11:59 EDT    At this point I feel like this is the safest plan to try to keep him out of violent restraints.  He had to be placed in violent restraints over the weekend due to a paradoxical reaction to Ativan vs  worsenig delirium.  We are again seeing this some today's Ativan's been pushed multiple times this morning.  As I mentioned above I do not feel like this is really alcohol withdrawal and more related to delirium secondary to his seizures and traumatic brain injury.  I do feel like have laid out the safest course of care for this gentleman.  While he had to be placed in violent restraints this was due to delirium and not due to malicious intent.  His violent behavior was all related to delirium.  I do see her concerned that he is a young healthy gentleman and can get out of control rather quickly if the delirium were to worsen that to the point for the sitter is to hopefully keep and redirected enough that we do not end up with these behavior concerns.    Labs reviewed and chemistries within normal limits with mild LFT elevation plan to follow for now.      Electronically signed by Sheng Hyatt MD, 10/03/23, 2:25 PM EDT.          Electronically signed by Sheng Hyatt MD at 10/03/23 8603       Yamileth Flores PA at 10/03/23 0930       Attestation signed by Suzanne Murdock MD at 10/03/23 1905    I have reviewed this documentation and agree.                  DOS: 10/3/2023  NAME: Agustin Stubbs   : 1972  PCP: Indy Moffett APRN  No chief complaint on file.      Chief complaint: seizures  Subjective: Patient sleeping received Ativan and phenobarbital this morning.  Wife and friend at bedside patient.  She says he ate breakfast.  He participated in PT and ate well yesterday.  Still with episodes of agitation and trying to get out of bed    Objective:  Vital signs: BP (!) 127/101 (BP Location: Right arm, Patient Position: Lying)   Pulse 107   Temp 98.4 °F (36.9 °C) (Oral)   Resp 18   Wt 92.6 kg (204 lb 2.3 oz)   SpO2 94%   BMI 28.49 kg/m²      Gen: NAD, vitals reviewed  MS: Some participatory, follows simple commands, requires restimulation, memory impaired  language intact, no  neglect.  CN: visual acuity grossly normal, PERRL, EOMI, no nystagmus no disconjugate gaze, no facial droop, no dysarthria  Motor: Symmetric power throughout, normal tone, no abnormal movements  sensory: intact to light touch all 4 ext.  Trace: down toes    ROS:  No weakness, numbness  No fevers, chills      Laboratory results:  Lab Results   Component Value Date    GLUCOSE 124 (H) 09/28/2023    CALCIUM 8.4 (L) 09/28/2023     09/28/2023    K 3.8 09/28/2023    CO2 21.8 (L) 09/28/2023     (H) 09/28/2023    BUN 12 09/28/2023    CREATININE 0.95 09/28/2023    BCR 12.6 09/28/2023    ANIONGAP 9.2 09/28/2023     Lab Results   Component Value Date    WBC 10.52 09/29/2023    HGB 13.6 09/29/2023    HCT 39.3 09/29/2023    MCV 96.1 09/29/2023     09/29/2023     Lab Results   Component Value Date     (H) 02/09/2023     (H) 04/06/2022    LDL 96 03/18/2021            Review of labs: Glucose 100, no labs from today.  CBC on 9/29 with WBCs 10,000, RBCs 4, hemoglobin 13.6, platelets 208, CMP on 9/28 sodium 139, chloride 108, CO2 21, potassium 38,, calcium 8.4, BUN 12, creatinine 1.98    Review and interpretation of imaging: MRI brain with and without notes encephalomalacia bilateral frontal lobes left more than right    Workup to date:  Date of onset: 9/28/2023 at 0006  Date of offset: 9/28/2023 at 0836     Indication: 50 year old man with seizure like activity.  Prolonged continuous video EEG for further evaluation.  Patient is intubated.      Technical description:  This is a 21 channel digital EEG recording that began on 9/28/2023 at 0006 and ended on 9/28/2023 at 0836.  Electrodes are placed based on the 10-20 international electrode placement system.  Simultaneous video acquisition utilized.      Background:  The EEG recording demonstrates moderate diffuse background slowing with mainly theta and delta frequencies.  No PDR is noted.  The patient is intubated and some sleep spindles are noted.   Hyperventilation and photic stimulation were not performed.  There are no asymmetries between the two hemispheres.  No interictal activity is present.     The EKG monitor shows a heart rate is around 60 beats per minute.     Clinical interpretation:  This prolonged 8 hours and 30 minutes continuous video EEG is abnormal due to the presence of moderate diffuse background slowing.  The results of this study are nonspecific, but may indicate a moderate encephalopathy or medication effect.  No potentially epileptogenic activity, seizure activity, or focal slowing is present.  Careful clinical correlation is advised.           Date of onset: 9/28/2023  Date of offset: 9/29/2023     Indication: Monitoring for seizures     Technical description:  This is a 21 channel digital EEG recording that began on 0908 and ended on 0900 the next day.    Background:  The background consists of a posteriorly dominant rhythm that is notably absent.  Anteriorly, there is diffuse delta and theta activity.  There is fair spontaneous variability.     Hyperventilation and photic stimulation were not performed.  There is no focal slowing.  There are no interictal epileptiform discharges and no electrographic seizures noted during the study.  The study is technically limited by the presence of Fp2, F8, F7 artifact in various portions during the study.  Near the end of the study there is also left temporal chain and right temporal chain artifact which limits interpretation at those regions.  EKG demonstrates normal rate.     Impression:  This is an abnormal study due to the presence of diffuse delta and theta slowing.  There are no interictal epileptiform discharges and no electrographic seizures.  These electrophysiologic findings are indicative of moderately severe encephalopathy.          Diagnoses:  1 Status epilepticus  2 h/o TBI  3 h/o ETOH use     Impression: 49 yo RH M with h/o TBI about 7 yrs ago, daily etoh abuser transferred from [facility]  with concern for SE, intubated and in ICU for further mgtm.  Other things included troponemia and WD. He was initiated on keppra and continuous EEG monitoring was negative for ictal d/c or seizure.  MRI completed and no acute finding, there is not unexpected b/l frontal encephalomalacia.  His improvement has been complicated by ongoing WD and delirium. Hopeful he will show more recovery when able to ease on acute WD treatment     He is more alert and some precipitating today. Worked with PT and eating meals, but still agitated michell in night and requiring PRNs for attempts to pull out Ivs and get out of bed unassisted.  Maybe scheduled night time seroquel or zyprexa will help but HR/BP elevated.  I have encouraged family to remain bedside as able, help with redirection.  He has neurologist with whom he used to follow and wife going to get an appointment.  Agree with psychiatry for help with behavioral component      Neuro team available as needed      Thank you for this consultation.  Discussed above plan with neuro attending, Dr. Murdock who agrees with above plan.  Neurology team is available for concerns or questions.      Electronically signed by Suzanne Murdock MD at 10/03/23 1354       Sheng Hyatt MD at 10/02/23 1250            [facility]    [phone]   LOS: 5 days     Name: Agustin Stubbs  Age/Sex: 50 y.o. male  :  1972        PCP: Indy Moffett APRN  No chief complaint on file.     Subjective   Remeians pretty sleepy but did wake up earlier and eat   Unable to assess review of systems    enoxaparin, 40 mg, Subcutaneous, Q24H  folic acid (FOLVITE) IVPB, 1 mg, Intravenous, Daily  lisinopril, 10 mg, Oral, Q24H   And  hydroCHLOROthiazide, 12.5 mg, Oral, Q24H  levETIRAcetam, 1,000 mg, Oral, Q12H   Or  levETIRAcetam, 1,000 mg, Intravenous, Q12H  metoprolol succinate XL, 25 mg, Oral, Q24H  multivitamin with minerals, 1 tablet, Oral, Daily  nicotine, 1 patch, Transdermal,  Q24H  PHENobarbital, 32.4 mg, Oral, Once   Followed by  [START ON 10/3/2023] PHENobarbital, 32.4 mg, Oral, Once   Followed by  [START ON 10/3/2023] PHENobarbital, 32.4 mg, Oral, Once  PHENobarbital, 2 mg/kg (Ideal), Intravenous, Once  senna-docusate sodium, 2 tablet, Oral, BID  sodium chloride, 10 mL, Intravenous, Q12H  thiamine, 100 mg, Oral, Daily           Objective   Vital Signs  Temp:  [97.9 °F (36.6 °C)-99.5 °F (37.5 °C)] 98.6 °F (37 °C)  Heart Rate:  [] 100  Resp:  [16-18] 18  BP: (123-150)/(88-97) 150/90  Body mass index is 28.12 kg/m².  No intake or output data in the 24 hours ending 10/02/23 1250      Physical Exam  Vitals and nursing note reviewed.   Constitutional:       General: He is not in acute distress.     Appearance: He is obese. He is ill-appearing.   Cardiovascular:      Rate and Rhythm: Normal rate and regular rhythm.   Pulmonary:      Effort: No respiratory distress.      Breath sounds: Normal breath sounds.   Abdominal:      General: Bowel sounds are normal. There is no distension.      Palpations: Abdomen is soft.   Neurological:      Mental Status: He is alert.      Comments: Lethargic and minimally responsive         Results Review:       I reviewed the patient's new clinical results.  Results from last 7 days   Lab Units 09/29/23  0338 09/27/23  1642 09/27/23  0442   WBC 10*3/mm3 10.52 11.33* 16.21*   HEMOGLOBIN g/dL 13.6 14.8 16.9   PLATELETS 10*3/mm3 208 226 362       Results from last 7 days   Lab Units 09/28/23  0914 09/27/23  1642 09/27/23  0609 09/27/23  0444 09/27/23  0442   SODIUM mmol/L 139 145  --   --  140   SODIUM, ARTERIAL mmol/L  --   --  141.4 143.5  --    POTASSIUM mmol/L 3.8 3.5  --   --  4.1   CHLORIDE mmol/L 108* 111*  --   --  99   CO2 mmol/L 21.8* 21.8*  --   --  5.3*   BUN mg/dL 12 13  --   --  12   CREATININE mg/dL 0.95 0.88  --   --  1.25   CALCIUM mg/dL 8.4* 8.2*  --   --  9.8   MAGNESIUM mg/dL  --   --   --   --  2.4     Estimated Creatinine Clearance:  109.9 mL/min (by C-G formula based on SCr of 0.95 mg/dL).      Assessment & Plan   Active Hospital Problems    Diagnosis  POA    **Status epilepticus [G40.901]  Yes    Alcohol withdrawal [F10.939]  Unknown    Hypoxia [R09.02]  Unknown    Essential hypertension [I10]  Yes      Resolved Hospital Problems   No resolved problems to display.       PLAN  51yo gentleman with TBI, tobacco abuse, alcohol abuse, and HTN, who was transferred to [facility] ICU from [facility] with status epilepticus and acute resp failure. We were asked to assume care when pt came out of ICU .   -continue keppra.  -Continue phenobarbital protocol for his alcohol withdrawal.  Continue to monitor CIWA scores  -Renal function electrolytes stable  -He has this lump on the back of his head.  It looks to be soft tissue on the MRI.  Its not commented on by the radiologist.  -Lovenox for DVT prophylaxis  -Full code    FMLA paperwork returned  Disposition  Expected Discharge Date: 10/5/2023; Expected Discharge Time:        Sheng Hyatt MD  [facility]  10/02/23  12:50 EDT            Electronically signed by Sheng Hyatt MD at 10/02/23 1254       Yamileth Flores PA at 10/02/23 1002       Attestation signed by Suzanne Murdock MD at 10/02/23 1530    I have reviewed this documentation and agree.                  DOS: 10/2/2023  NAME: Agustin Stubbs   : 1972  PCP: Indy Moffett APRN  No chief complaint on file.      Chief complaint: Seizures  Subjective: Wife and dtr in law at bedside.  He is somnolent and has not awaken to eat breakfast.  Family describing episodes of agitation, taking out Ivs and not staying in bed.  No prior h/o seizures    Objective:  Vital signs: /90 (BP Location: Right arm, Patient Position: Lying)   Pulse 100   Temp 98.6 °F (37 °C) (Oral)   Resp 18   Wt 91.4 kg (201 lb 8 oz)   SpO2 94%   BMI 28.12 kg/m²      Gen: NAD, vitals reviewed  MS: somnolent, snoring, non verbal,  not FC  CN: visual acuity grossly normal, PERRL, EOMI, no facial droop, no dysarthria  Motor: spontaneous mvtms seen, WD LE  Sensory: localizes throughout  Reflexes: trace, down toes    ROS:  No weakness, numbness  No fevers, chills      Laboratory results:  Lab Results   Component Value Date    GLUCOSE 124 (H) 09/28/2023    CALCIUM 8.4 (L) 09/28/2023     09/28/2023    K 3.8 09/28/2023    CO2 21.8 (L) 09/28/2023     (H) 09/28/2023    BUN 12 09/28/2023    CREATININE 0.95 09/28/2023    BCR 12.6 09/28/2023    ANIONGAP 9.2 09/28/2023     Lab Results   Component Value Date    WBC 10.52 09/29/2023    HGB 13.6 09/29/2023    HCT 39.3 09/29/2023    MCV 96.1 09/29/2023     09/29/2023     Lab Results   Component Value Date     (H) 02/09/2023     (H) 04/06/2022    LDL 96 03/18/2021            Review of labs:     Review and interpretation of imaging: MRI brain w and without 9/30  FINDINGS:  Multiplanar images of the head were obtained without and with  gadolinium. No areas of restricted diffusion are seen to suggest acute  infarct. There is atrophy, perhaps advanced for the age of 50. Areas of  encephalomalacia are noted within the frontal lobes bilaterally, left  greater than right. There is some mild periventricular microangiopathic  change. Intracranial flow voids appear intact. No abnormalities seen on  susceptibility weighted imaging. There is no evidence of abnormal  enhancement or venous occlusion on postcontrast imaging. Please note,  postcontrast imaging is significantly degraded by motion artifact.  Mucous retention cysts are suspected within the maxillary sinuses  bilaterally. There is some mucosal thickening within the right sphenoid,  ethmoid, and frontal sinuses. Trace bilateral mastoid effusions are  noted.     IMPRESSION:  1. No acute intracranial abnormality.   No abnormal enhancement.    Workup to date: EEG   Date of onset: 9/28/2023 at 0006  Date of offset: 9/28/2023 at 0836      Indication: 50 year old man with seizure like activity.  Prolonged continuous video EEG for further evaluation.  Patient is intubated.      Technical description:  This is a 21 channel digital EEG recording that began on 9/28/2023 at 0006 and ended on 9/28/2023 at 0836.  Electrodes are placed based on the 10-20 international electrode placement system.  Simultaneous video acquisition utilized.      Background:  The EEG recording demonstrates moderate diffuse background slowing with mainly theta and delta frequencies.  No PDR is noted.  The patient is intubated and some sleep spindles are noted.  Hyperventilation and photic stimulation were not performed.  There are no asymmetries between the two hemispheres.  No interictal activity is present.     The EKG monitor shows a heart rate is around 60 beats per minute.     Clinical interpretation:  This prolonged 8 hours and 30 minutes continuous video EEG is abnormal due to the presence of moderate diffuse background slowing.  The results of this study are nonspecific, but may indicate a moderate encephalopathy or medication effect.  No potentially epileptogenic activity, seizure activity, or focal slowing is present.  Careful clinical correlation is advised.          Date of onset: 9/28/2023  Date of offset: 9/29/2023     Indication: Monitoring for seizures     Technical description:  This is a 21 channel digital EEG recording that began on 0908 and ended on 0900 the next day.    Background:  The background consists of a posteriorly dominant rhythm that is notably absent.  Anteriorly, there is diffuse delta and theta activity.  There is fair spontaneous variability.     Hyperventilation and photic stimulation were not performed.  There is no focal slowing.  There are no interictal epileptiform discharges and no electrographic seizures noted during the study.  The study is technically limited by the presence of Fp2, F8, F7 artifact in various portions during the study.   Near the end of the study there is also left temporal chain and right temporal chain artifact which limits interpretation at those regions.  EKG demonstrates normal rate.     Impression:  This is an abnormal study due to the presence of diffuse delta and theta slowing.  There are no interictal epileptiform discharges and no electrographic seizures.  These electrophysiologic findings are indicative of moderately severe encephalopathy.             Diagnoses:  1 Status epilepticus  2 h/o TBI  3 h/o ETOH use    Impression: 49 yo RH M with h/o TBI about 7 yrs ago, daily etoh abuser transferred from OSH with concern for SE, intubated and in ICU for further mgtm.  Other things included troponemia and WD.   He was initiated on keppra and continuous EEG monitoring was negative for ictal d/c or seizure.  MRI completed and no acute finding, there is not unexpected b/l frontal encephalomalacia.  His improvement has been complicated by ongoing WD and delirium.  Hopeful he will show more recovery when able to ease on acute WD treatment    Plan:  1. Continue LEV 1000/1000  2. Delirium precautions discussed with family at bedside  3. Probably needs derm outpt for occiput skin lesion  4  Asking for [facility] paperwork    Seizure Precautions: Patient is not to drive for at least 3 months until cleared by a physician, operate heavy machinery, take tub baths, swim unattended, climb heights, or perform any other activities that can bring harm to himself/herself or others during an episode of altered awareness.    We will be following along with you      Thank you for this consultation.  Discussed above plan with neuro attending, Dr. Murdock who agrees with above plan.  Neurology team is available for concerns or questions.          Electronically signed by Suzanne Murdock MD at 10/02/23 6183       Sheng Hyatt MD at 10/01/23 5815            [facility]    [phone]   LOS: 4 days     Name: Agustin Stubbs  Age/Sex:  50 y.o. male  :  1972        PCP: Indy Moffett APRN  No chief complaint on file.     Subjective   He had a really rough night last night became agitated and delirious and required violent restraints.  He was given multiple doses of medication this morning is pretty somnolent.  Currently unable to take p.o. and at times somewhat distressed with coughing.  Unable to assess review of systems    enoxaparin, 40 mg, Subcutaneous, Q24H  folic acid (FOLVITE) IVPB, 1 mg, Intravenous, Daily  lisinopril, 10 mg, Oral, Q24H   And  hydroCHLOROthiazide, 12.5 mg, Oral, Q24H  levETIRAcetam, 1,000 mg, Oral, Q12H   Or  levETIRAcetam, 1,000 mg, Intravenous, Q12H  metoprolol succinate XL, 25 mg, Oral, Q24H  multivitamin with minerals, 1 tablet, Oral, Daily  nicotine, 1 patch, Transdermal, Q24H  PHENobarbital, 65 mg, Intravenous, Once   Followed by  [START ON 10/2/2023] PHENobarbital, 65 mg, Intravenous, Once   Followed by  [START ON 10/2/2023] PHENobarbital, 32.4 mg, Oral, Once   Followed by  [START ON 10/3/2023] PHENobarbital, 32.4 mg, Oral, Once   Followed by  [START ON 10/3/2023] PHENobarbital, 32.4 mg, Oral, Once  PHENobarbital, 2 mg/kg (Ideal), Intravenous, Once  senna-docusate sodium, 2 tablet, Oral, BID  sodium chloride, 10 mL, Intravenous, Q12H  thiamine (B-1) IV, 200 mg, Intravenous, Q8H   Followed by  [START ON 10/2/2023] thiamine, 100 mg, Oral, Daily           Objective   Vital Signs  Temp:  [97.3 °F (36.3 °C)-98.6 °F (37 °C)] 97.5 °F (36.4 °C)  Heart Rate:  [] 82  Resp:  [16-22] 16  BP: (141-179)/() 141/85  Body mass index is 29.53 kg/m².    Intake/Output Summary (Last 24 hours) at 10/1/2023 1328  Last data filed at 10/1/2023 0600  Gross per 24 hour   Intake 120 ml   Output 850 ml   Net -730 ml       Physical Exam  Vitals and nursing note reviewed.   Constitutional:       General: He is not in acute distress.     Appearance: He is obese. He is ill-appearing.   Cardiovascular:      Rate and Rhythm:  Normal rate and regular rhythm.   Pulmonary:      Effort: No respiratory distress.      Breath sounds: Normal breath sounds.   Abdominal:      General: Bowel sounds are normal. There is no distension.      Palpations: Abdomen is soft.   Neurological:      Mental Status: He is alert.      Comments: Lethargic and minimally responsive         Results Review:       I reviewed the patient's new clinical results.  Results from last 7 days   Lab Units 09/29/23  0338 09/27/23  1642 09/27/23  0442   WBC 10*3/mm3 10.52 11.33* 16.21*   HEMOGLOBIN g/dL 13.6 14.8 16.9   PLATELETS 10*3/mm3 208 226 362     Results from last 7 days   Lab Units 09/28/23  0914 09/27/23  1642 09/27/23  0609 09/27/23  0444 09/27/23  0442   SODIUM mmol/L 139 145  --   --  140   SODIUM, ARTERIAL mmol/L  --   --  141.4 143.5  --    POTASSIUM mmol/L 3.8 3.5  --   --  4.1   CHLORIDE mmol/L 108* 111*  --   --  99   CO2 mmol/L 21.8* 21.8*  --   --  5.3*   BUN mg/dL 12 13  --   --  12   CREATININE mg/dL 0.95 0.88  --   --  1.25   CALCIUM mg/dL 8.4* 8.2*  --   --  9.8   MAGNESIUM mg/dL  --   --   --   --  2.4   Estimated Creatinine Clearance: 112.4 mL/min (by C-G formula based on SCr of 0.95 mg/dL).      Assessment & Plan   Active Hospital Problems    Diagnosis  POA    **Status epilepticus [G40.901]  Yes    Alcohol withdrawal [F10.939]  Unknown    Hypoxia [R09.02]  Unknown    Essential hypertension [I10]  Yes      Resolved Hospital Problems   No resolved problems to display.       PLAN  49yo gentleman with TBI, tobacco abuse, alcohol abuse, and HTN, who was transferred to [facility] ICU from [facility] with status epilepticus and acute resp failure. We were asked to assume care when pt came out of ICU 9/28.   -Change his Keppra dosing to IV since he is not able to take p.o. presently.  -Continue phenobarbital protocol for his alcohol withdrawal.  Continue to monitor CIWA scores  -Renal function electrolytes stable  -He has this lump on the back of his head.  It  "looks to be soft tissue on the MRI.  Its not commented on by the radiologist.  -Lovenox for DVT prophylaxis  -Full code      Disposition  Expected discharge date/ time has not been documented.       Sheng Hyatt MD  [facility]  10/01/23  13:28 EDT            Electronically signed by Sheng Hyatt MD at 10/01/23 1335          Consult Notes (last 48 hours)        Bradley Gregory III, MD at 10/04/23 1235        Consult Orders    1. Inpatient Psychiatrist Consult [[id number]] ordered by Sheng Hyatt MD at 10/03/23 115                 IDENTIFYING INFORMATION: The patient is a 50-year-old white male admitted in status epilepticus.    CHIEF COMPLAINT: None given    INFORMANT: Wife, patient is sleeping soundly during attempted interview    RELIABILITY: Good    HISTORY OF PRESENT ILLNESS: The patient is a 42-year-old white male with no prior history of psychiatric treatment.  The patient's wife reports that he drinks about \"5 beers a night\".  He began to suffer seizures on 9/27/2023 and was brought to this facility.  He has since been on a detoxication protocol which features both lorazepam and phenobarbital.  It is unclear as to whether the source of his seizures was alcohol related but he has been started on Keppra.  When seen today the patient is sleeping soundly and cannot be awakened for interview.  He has required several doses of as needed Zyprexa since hospitalization.  A sitter is currently in place secondary to the patient's constant need for redirection.    PAST PSYCHIATRIC HISTORY: Other than alcohol use as noted previously there is no reported history of psychiatric treatment    PAST MEDICAL HISTORY: Significant for hypertension, allergic rhinitis, hyperlipidemia, hypertension    MEDICATIONS:   Current Facility-Administered Medications   Medication Dose Route Frequency Provider Last Rate Last Admin    acetaminophen (TYLENOL) tablet 650 mg  650 mg Oral Q4H PRN " Gildardo Monroe Jr., MD   650 mg at 10/03/23 2053    Or    acetaminophen (TYLENOL) suppository 650 mg  650 mg Rectal Q4H PRN Gildardo Monroe Jr., MD        sennosides-docusate (PERICOLACE) 8.6-50 MG per tablet 2 tablet  2 tablet Oral BID Gildardo Monroe Jr., MD   2 tablet at 10/03/23 2043    And    polyethylene glycol (MIRALAX) packet 17 g  17 g Oral Daily PRN Gildardo Monroe Jr., MD        And    bisacodyl (DULCOLAX) EC tablet 5 mg  5 mg Oral Daily PRN Gildardo Monroe Jr., MD        And    bisacodyl (DULCOLAX) suppository 10 mg  10 mg Rectal Daily PRN Gildardo Monroe Jr., MD        Calcium Replacement - Follow Nurse / BPA Driven Protocol   Does not apply PRN Gildardo Monroe Jr., MD        Enoxaparin Sodium (LOVENOX) syringe 40 mg  40 mg Subcutaneous Q24H Gildardo Monroe Jr., MD   40 mg at 10/03/23 1255    folic acid 1 mg in sodium chloride 0.9 % 50 mL IVPB  1 mg Intravenous Daily Gildardo Monroe Jr.,  mL/hr at 10/04/23 0934 1 mg at 10/04/23 0934    lisinopril (PRINIVIL,ZESTRIL) tablet 10 mg  10 mg Oral Q24H Gildardo Monroe Jr., MD   10 mg at 10/03/23 0810    And    hydroCHLOROthiazide (HYDRODIURIL) tablet 12.5 mg  12.5 mg Oral Q24H Gildardo Monroe Jr., MD   12.5 mg at 10/03/23 0809    labetalol (NORMODYNE,TRANDATE) injection 20 mg  20 mg Intravenous Q10 Min PRN Gildardo Monroe Jr., MD   20 mg at 09/29/23 0836    levETIRAcetam (KEPPRA) tablet 1,000 mg  1,000 mg Oral Q12H Sheng Hyatt MD   1,000 mg at 10/03/23 2043    Or    levETIRAcetam (KEPPRA) injection 1,000 mg  1,000 mg Intravenous Q12H Sheng Hyatt MD   1,000 mg at 10/04/23 0825    LORazepam (ATIVAN) tablet 2 mg  2 mg Oral Q6H PRN Bradley Gregory III, MD        Magnesium Standard Dose Replacement - Follow Nurse / BPA Driven Protocol   Does not apply PRN Gildardo Monroe Jr., MD        metoprolol succinate XL (TOPROL-XL) 24 hr tablet 25 mg  25 mg Oral Q24H Zelda Donohue,  APRN   25 mg at 10/03/23 0807    metoprolol tartrate (LOPRESSOR) injection 5 mg  5 mg Intravenous Q6H PRN Sheng Hyatt MD        multivitamin with minerals 1 tablet  1 tablet Oral Daily Gildardo Monroe Jr., MD   1 tablet at 10/03/23 0807    nicotine (NICODERM CQ) 21 MG/24HR patch 1 patch  1 patch Transdermal Q24H Gildardo Monroe Jr., MD   1 patch at 10/03/23 2044    nitroglycerin (NITROSTAT) SL tablet 0.4 mg  0.4 mg Sublingual Q5 Min PRN Gildardo Monroe Jr., MD        OLANZapine (zyPREXA) injection 5 mg  5 mg Intramuscular Q8H PRN Sheng Hyatt MD        OLANZapine (zyPREXA) tablet 5 mg  5 mg Oral Nightly Sheng Hyatt MD   5 mg at 10/03/23 2043    ondansetron (ZOFRAN) injection 4 mg  4 mg Intravenous Q6H PRN Gildardo Monroe Jr., MD        Phosphorus Replacement - Follow Nurse / BPA Driven Protocol   Does not apply PRN Gildardo Monroe Jr., MD        Potassium Replacement - Follow Nurse / BPA Driven Protocol   Does not apply PRGildardo Jade Jr., MD        sodium chloride 0.9 % flush 10 mL  10 mL Intravenous Q12H Gildardo Monroe Jr., MD   10 mL at 10/03/23 2045    sodium chloride 0.9 % flush 10 mL  10 mL Intravenous PRN Gildardo Monroe Jr., MD        sodium chloride 0.9 % infusion 40 mL  40 mL Intravenous PRN Gildardo Monroe Jr., MD        thiamine (VITAMIN B-1) tablet 100 mg  100 mg Oral Daily Gildardo Monroe Jr., MD   100 mg at 10/03/23 0807         ALLERGIES: Penicillin    FAMILY HISTORY: Not obtained    SOCIAL HISTORY: Alcohol use as noted previously    MENTAL STATUS EXAM: The patient is a well-developed and nourished white male who is dressed in hospital garb.  No restraints are in place.  He is sleeping soundly and cannot be awakened for interview.    ASSETS/LIABILITIES: To be assessed    DIAGNOSTIC IMPRESSION: Alcohol use disorder, possible alcohol withdrawal seizure    PLAN: The patient is now 7 days out from his last drink.  In some cases we  see that continued administration of Ativan mimics the confusional symptoms associated with delirium tremens.  Accordingly I have discontinued Ativan and will give this medication only if the patient exhibits autonomic hyperactivity consistent with benzodiazepine and/or alcohol withdrawal.  I will continue to follow with you.    Electronically signed by Bradley Gregory III, MD at 10/04/23 9944

## 2024-10-05 NOTE — ASSESSMENT & PLAN NOTE
Patient is a 53-year-old male with past medical history of hep C, GERD, chronic hep B and OUD as well as past psychiatric history of bipolar disorder. Patient self presented to outside hospital ED with thoughts of wanting to harm himself and signed a 201. He was admitted to [facility] for further stabilization.     Patient's depressive symptoms seem to be stabilizing/improving.  [facility] potential [facility]/follow-up option.    -resume prozac 40 mg QD  -resume seroquel 200 HS and 50 BID

## 2024-10-06 PROCEDURE — 99232 SBSQ HOSP IP/OBS MODERATE 35: CPT | Performed by: NURSE PRACTITIONER

## 2024-10-06 RX ADMIN — BUPRENORPHINE AND NALOXONE 8 MG: 8; 2 FILM BUCCAL; SUBLINGUAL at 17:29

## 2024-10-06 RX ADMIN — CLOTRIMAZOLE: 1 CREAM TOPICAL at 08:20

## 2024-10-06 RX ADMIN — CYANOCOBALAMIN TAB 1000 MCG 1000 MCG: 1000 TAB at 08:18

## 2024-10-06 RX ADMIN — SENNOSIDES 17.2 MG: 8.6 TABLET, FILM COATED ORAL at 21:07

## 2024-10-06 RX ADMIN — BUPRENORPHINE AND NALOXONE 8 MG: 8; 2 FILM BUCCAL; SUBLINGUAL at 08:18

## 2024-10-06 RX ADMIN — FLUOXETINE HYDROCHLORIDE 40 MG: 20 CAPSULE ORAL at 08:18

## 2024-10-06 RX ADMIN — QUETIAPINE FUMARATE 200 MG: 100 TABLET ORAL at 21:07

## 2024-10-06 RX ADMIN — QUETIAPINE FUMARATE 50 MG: 50 TABLET ORAL at 17:29

## 2024-10-06 RX ADMIN — QUETIAPINE FUMARATE 50 MG: 50 TABLET ORAL at 08:18

## 2024-10-06 RX ADMIN — MELATONIN TAB 3 MG 3 MG: 3 TAB at 21:07

## 2024-10-06 RX ADMIN — CLOTRIMAZOLE: 1 CREAM TOPICAL at 17:35

## 2024-10-06 RX ADMIN — Medication 2000 UNITS: at 08:18

## 2024-10-06 NOTE — PROGRESS NOTES
"  Progress Note - Behavioral Health   Name: Perez Stack Jr. 53 y.o. male I MRN: [id number]  Unit/Bed#: [facility] 649-01 I Date of Admission: 10/2/2024   Date of Service: 10/6/2024 I Hospital Day: 4     Assessment & Plan  Bipolar disorder (HCC)  Patient is a 53-year-old male with past medical history of hep C, GERD, chronic hep B and OUD as well as past psychiatric history of bipolar disorder. Patient self presented to outside hospital ED with thoughts of wanting to harm himself and signed a 201. He was admitted to [facility] for further stabilization.     Patient's depressive symptoms seem to be stabilizing/improving.  Social work to look into [facility] rehab for patient as a potential discharge/follow-up option.    -resume prozac 40 mg QD  -resume seroquel 200 HS and 50 BID  Opioid use disorder in remission  -suboxone 8 mg film QD and   Hepatitis C virus infection  -as per SLIM  Per chart review, patient has a history of hepatitis C.  Unclear if this has been treated, per chart review, patient is \"status post failed treatment \"  AST and ALT are mildly elevated but improved from prior  Patient to follow-up with gastroenterology after discharge  Will place an ambulatory referral to GI  Gastroesophageal reflux disease  -as per SLIM  Resume Protonix as previously recommended by GI  Maalox and Pepcid as needed  Chronic hepatitis B virus infection (HCC)  -as per [facility]  Plan for outpatient follow-up with GI   Medical clearance for psychiatric admission  -as per SLIM  Severe protein-calorie malnutrition (HCC)  Malnutrition Findings:       -as per SLIM                        BMI Findings:           Body mass index is 22.8 kg/m².       Progress Toward Goals: Improving mood.    Recommended Treatment: Continue with group therapy, milieu therapy and occupational therapy.    As per the treatment team.  Risks, benefits and possible side effects of Medications:   Risks, benefits, and possible side effects of medications " explained to patient and patient verbalizes understanding.      History of Present Illness   Behavior over the last 24 hours:  unchanged  Sleep: normal  Appetite: normal  Medication side effects: No  ROS: no complaints    Subjective: Patient was seen for continuing care and treatment.  Case was discussed with the nursing staff.  On examination today, there are no new clinical issues or concerns expressed by patient.  His mood is good.  He is scheduled for tentative discharge tomorrow.  No other new clinical issues or concerns were expressed by patient or staff.  Continue current meds and treatment.    Objective   Mental Status Evaluation:  Appearance:  age appropriate and casually dressed   Behavior:  Pleasant and cooperative   Speech:  normal pitch and normal volume   Mood:  euthymic   Affect:  constricted   Thought Process:  goal directed   Associations: intact associations   Thought Content:  No overt delusions.   Perceptual Disturbances: None   Risk Potential: Suicidal Ideations none  Homicidal Ideations none  Potential for Aggression No   Sensorium:  person, place, time/date, and situation   Memory:  recent and remote memory grossly intact   Consciousness:  alert and awake    Attention: attention span and concentration were age appropriate   Insight:  limited   Judgment: limited   Gait/Station: normal gait/station   Motor Activity: no abnormal movements     Medications: current meds:   Current Facility-Administered Medications:     aluminum-magnesium hydroxide-simethicone (MAALOX) oral suspension 30 mL, Q4H PRN    buprenorphine-naloxone (Suboxone) film 8 mg, After Dinner    buprenorphine-naloxone (Suboxone) film 8 mg, Daily    Cholecalciferol (VITAMIN D3) tablet 2,000 Units, Daily    clotrimazole (LOTRIMIN) 1 % cream, BID    cyanocobalamin (VITAMIN B-12) tablet 1,000 mcg, Daily    famotidine (PEPCID) tablet 20 mg, BID PRN    FLUoxetine (PROzac) capsule 40 mg, Daily    haloperidol lactate (HALDOL) injection 5  mg, Q4H PRN Max 4/day    hydrOXYzine HCL (ATARAX) tablet 25 mg, Q6H PRN Max 4/day    hydrOXYzine HCL (ATARAX) tablet 50 mg, Q6H PRN Max 4/day    ibuprofen (MOTRIN) tablet 200 mg, Q6H PRN    ibuprofen (MOTRIN) tablet 400 mg, Q6H PRN    ibuprofen (MOTRIN) tablet 600 mg, Q8H PRN    melatonin tablet 3 mg, HS    methocarbamol (ROBAXIN) tablet 500 mg, Q8H PRN    nicotine polacrilex (NICORETTE) gum 4 mg, Q2H PRN    ondansetron (ZOFRAN-ODT) dispersible tablet 4 mg, Q8H PRN    propranolol (INDERAL) tablet 5 mg, Q8H PRN    QUEtiapine (SEROquel) tablet 200 mg, HS    QUEtiapine (SEROquel) tablet 50 mg, BID    risperiDONE (RisperDAL) tablet 0.25 mg, Q4H PRN Max 6/day    risperiDONE (RisperDAL) tablet 0.5 mg, Q4H PRN Max 3/day    risperiDONE (RisperDAL) tablet 1 mg, Q2H PRN Max 3/day    senna (SENOKOT) tablet 17.2 mg, HS    traZODone (DESYREL) tablet 50 mg, Q6H PRN Max 3/day.      Lab Results: I have reviewed the following results:  Most Recent Labs:   Lab Results   Component Value Date    WBC 3.95 (L) 10/02/2024    RBC 3.85 (L) 10/02/2024    HGB 12.4 10/02/2024    HCT 35.7 (L) 10/02/2024     10/02/2024    RDW 15.5 (H) 10/02/2024    NEUTROABS 2.38 10/02/2024    SODIUM 141 10/02/2024    K 3.9 10/02/2024     10/02/2024    CO2 33 (H) 10/02/2024    BUN 18 10/02/2024    CREATININE 0.73 10/02/2024    GLUC 98 10/02/2024    GLUF 87 05/03/2023    CALCIUM 9.2 10/02/2024    AST 67 (H) 10/02/2024    ALT 66 (H) 10/02/2024    ALKPHOS 80 10/02/2024    TP 6.8 10/02/2024    ALB 3.9 10/02/2024    TBILI 0.98 10/02/2024    CHOLESTEROL 167 10/02/2024    HDL 70 10/02/2024    TRIG 115 10/02/2024    LDLCALC 74 10/02/2024    NONHDLC 97 10/02/2024    AMMONIA 38 (H) 04/06/2014    EIE1TPKNAUBZ 1.176 10/01/2024    FREET4 0.82 10/20/2015    T3FREE 2.22 (L) 10/20/2015    RPR Non-Reactive 03/17/2016    HGBA1C 4.2 09/22/2024    EAG 74 09/22/2024       Administrative Statements   I have spent a total time of 30 minutes in caring for this patient on  the day of the visit/encounter including  medication management, treatment and chart review .

## 2024-10-06 NOTE — ASSESSMENT & PLAN NOTE
Patient is a 53-year-old male with past medical history of hep C, GERD, chronic hep B and OUD as well as past psychiatric history of bipolar disorder. Patient self presented to outside hospital ED with thoughts of wanting to harm himself and signed a 201. He was admitted to [facility] for further stabilization.     Patient's depressive symptoms seem to be stabilizing/improving.  Social work to look into [facility] rehab for patient as a potential discharge/follow-up option.    -resume prozac 40 mg QD  -resume seroquel 200 HS and 50 BID

## 2024-10-06 NOTE — PLAN OF CARE
Problem: Alteration in Thoughts and Perception  Goal: Verbalize thoughts and feelings  Description: Interventions:  - Promote a nonjudgmental and trusting relationship with the patient through active listening and therapeutic communication  - Assess patient's level of functioning, behavior and potential for risk  - Engage patient in 1 on 1 interactions  - Encourage patient to express fears, feelings, frustrations, and discuss symptoms    - [place] patient to reality, help patient recognize reality-based thinking   - Administer medications as ordered and assess for potential side effects  - Provide the patient education related to the signs and symptoms of the illness and desired effects of prescribed medications  Outcome: Progressing     Problem: Ineffective Coping  Goal: Identifies healthy coping skills  Outcome: Progressing     Problem: Ineffective Coping  Goal: Demonstrates healthy coping skills  Outcome: Progressing     Problem: Anxiety  Goal: Anxiety is at manageable level  Description: Interventions:  - Assess and monitor patient's anxiety level.   - Monitor for signs and symptoms (heart palpitations, chest pain, shortness of breath, headaches, nausea, feeling jumpy, restlessness, irritable, apprehensive).   - Collaborate with interdisciplinary team and initiate plan and interventions as ordered.  - [place] patient to unit/surroundings  - Explain treatment plan  - Encourage participation in care  - Encourage verbalization of concerns/fears  - Identify coping mechanisms  - Assist in developing anxiety-reducing skills  - Administer/offer alternative therapies  - Limit or eliminate stimulants  Outcome: Progressing     Problem: Alteration in Thoughts and Perception  Goal: Attend and participate in unit activities, including therapeutic, recreational, and educational groups  Description: Interventions:  -Encourage Visitation and family involvement in care  Outcome: Progressing

## 2024-10-06 NOTE — ASSESSMENT & PLAN NOTE
Malnutrition Findings:       -as per SLIM                        BMI Findings:           Body mass index is 22.8 kg/m².

## 2024-10-06 NOTE — NURSING NOTE
"Patient is visible on the unit. He is pleasant with an irritable edge. Patient endorses anxiety and depression but reports readiness for discharge. He is hoping HOST will find him placement soon. Patient is medication compliant with encouragement with the exception of Miralax. He attempts to refuse vitamins stating \"I already get them in my Ensures.\" Encouraged to inform staff of any needs or concerns.    "

## 2024-10-07 VITALS
HEART RATE: 71 BPM | BODY MASS INDEX: 22.8 KG/M2 | RESPIRATION RATE: 17 BRPM | OXYGEN SATURATION: 97 % | TEMPERATURE: 98.5 F | WEIGHT: 145.6 LBS | DIASTOLIC BLOOD PRESSURE: 58 MMHG | SYSTOLIC BLOOD PRESSURE: 117 MMHG

## 2024-10-07 PROCEDURE — 99238 HOSP IP/OBS DSCHRG MGMT 30/<: CPT | Performed by: PSYCHIATRY & NEUROLOGY

## 2024-10-07 RX ORDER — QUETIAPINE FUMARATE 50 MG/1
50 TABLET, FILM COATED ORAL 2 TIMES DAILY
Qty: 60 TABLET | Refills: 1 | Status: SHIPPED | OUTPATIENT
Start: 2024-10-07 | End: 2024-12-06

## 2024-10-07 RX ORDER — BUPRENORPHINE AND NALOXONE 8; 2 MG/1; MG/1
8 FILM, SOLUBLE BUCCAL; SUBLINGUAL
Qty: 10 FILM | Refills: 0 | Status: SHIPPED | OUTPATIENT
Start: 2024-10-07 | End: 2024-10-17

## 2024-10-07 RX ORDER — QUETIAPINE FUMARATE 200 MG/1
200 TABLET, FILM COATED ORAL
Qty: 30 TABLET | Refills: 1 | Status: SHIPPED | OUTPATIENT
Start: 2024-10-07 | End: 2024-12-06

## 2024-10-07 RX ORDER — FLUOXETINE 40 MG/1
40 CAPSULE ORAL DAILY
Qty: 30 CAPSULE | Refills: 1 | Status: SHIPPED | OUTPATIENT
Start: 2024-10-07 | End: 2024-12-06

## 2024-10-07 RX ORDER — SENNOSIDES 8.6 MG
17.2 TABLET ORAL
Qty: 60 TABLET | Refills: 1 | Status: SHIPPED | OUTPATIENT
Start: 2024-10-07 | End: 2024-12-06

## 2024-10-07 RX ORDER — BUPRENORPHINE AND NALOXONE 8; 2 MG/1; MG/1
8 FILM, SOLUBLE BUCCAL; SUBLINGUAL DAILY
Qty: 10 FILM | Refills: 0 | Status: SHIPPED | OUTPATIENT
Start: 2024-10-07 | End: 2024-10-17

## 2024-10-07 RX ADMIN — QUETIAPINE FUMARATE 50 MG: 50 TABLET ORAL at 09:32

## 2024-10-07 RX ADMIN — BUPRENORPHINE AND NALOXONE 8 MG: 8; 2 FILM BUCCAL; SUBLINGUAL at 09:32

## 2024-10-07 RX ADMIN — FLUOXETINE HYDROCHLORIDE 40 MG: 20 CAPSULE ORAL at 09:32

## 2024-10-07 NOTE — NURSING NOTE
Pt irritable and labile. Pt visible in milieu social with select peers. Pt was whatching football in dining room and became agitated when tech requested that he move to the day room to watch the game. Pt was redirected but referred to the incident several times later. Pt cooperative with care and medication compliant.

## 2024-10-07 NOTE — NURSING NOTE
Reviewed discharge instructions with patient.  2 copies of AVS sent with patient, 1 for him and 1 for [facility].  Patient picked up by  from [place].

## 2024-10-07 NOTE — PLAN OF CARE
Problem: Alteration in Thoughts and Perception  Goal: Verbalize thoughts and feelings  Description: Interventions:  - Promote a nonjudgmental and trusting relationship with the patient through active listening and therapeutic communication  - Assess patient's level of functioning, behavior and potential for risk  - Engage patient in 1 on 1 interactions  - Encourage patient to express fears, feelings, frustrations, and discuss symptoms    - [place] patient to reality, help patient recognize reality-based thinking   - Administer medications as ordered and assess for potential side effects  - Provide the patient education related to the signs and symptoms of the illness and desired effects of prescribed medications  Outcome: Progressing     Problem: Ineffective Coping  Goal: Identifies ineffective coping skills  Outcome: Progressing  Goal: Identifies healthy coping skills  Outcome: Progressing  Goal: Demonstrates healthy coping skills  Outcome: Progressing     Problem: Risk for Self Injury/Neglect  Goal: Refrain from harming self  Description: Interventions:  - Monitor patient closely, per order  - Develop a trusting relationship  - Supervise medication ingestion, monitor effects and side effects   Outcome: Progressing     Problem: Depression  Goal: Refrain from isolation  Description: Interventions:  - Develop a trusting relationship   - Encourage socialization   Outcome: Progressing  Goal: Refrain from self-neglect  Outcome: Progressing     Problem: Anxiety  Goal: Anxiety is at manageable level  Description: Interventions:  - Assess and monitor patient's anxiety level.   - Monitor for signs and symptoms (heart palpitations, chest pain, shortness of breath, headaches, nausea, feeling jumpy, restlessness, irritable, apprehensive).   - Collaborate with interdisciplinary team and initiate plan and interventions as ordered.  - [place] patient to unit/surroundings  - Explain treatment plan  - Encourage participation in  care  - Encourage verbalization of concerns/fears  - Identify coping mechanisms  - Assist in developing anxiety-reducing skills  - Administer/offer alternative therapies  - Limit or eliminate stimulants  Outcome: Progressing     Problem: PAIN - ADULT  Goal: Verbalizes/displays adequate comfort level or baseline comfort level  Description: Interventions:  - Encourage patient to monitor pain and request assistance  - Assess pain using appropriate pain scale  - Administer analgesics based on type and severity of pain and evaluate response  - Implement non-pharmacological measures as appropriate and evaluate response  - Consider cultural and social influences on pain and pain management  - Notify physician/advanced practitioner if interventions unsuccessful or patient reports new pain  Outcome: Progressing     Problem: Alteration in Thoughts and Perception  Goal: Attend and participate in unit activities, including therapeutic, recreational, and educational groups  Description: Interventions:  -Encourage Visitation and family involvement in care  Outcome: Progressing     Problem: DISCHARGE PLANNING - CARE MANAGEMENT  Goal: Discharge to post-acute care or home with appropriate resources  Description: INTERVENTIONS:  - Conduct assessment to determine patient/family and health care team treatment goals, and need for post-acute services based on payer coverage, community resources, and patient preferences, and barriers to discharge  - Address psychosocial, clinical, and financial barriers to discharge as identified in assessment in conjunction with the patient/family and health care team  - Arrange appropriate level of post-acute services according to patient’s   needs and preference and payer coverage in collaboration with the physician and health care team  - Communicate with and update the patient/family, physician, and health care team regarding progress on the discharge plan  - Arrange appropriate transportation to  post-acute venues  Outcome: Progressing

## 2024-10-07 NOTE — DISCHARGE SUMMARY
"Discharge Summary - Behavioral Health   Perez Stack Jr. 53 y.o. male MRN: [id number]  Unit/Bed#: [facility] 649-01 Encounter: [id number]  Hospital Day: 5     Admission Date: 10/2/2024         Discharge Date: 10/07/24    Attending Psychiatrist: Tony Parham MD    Reason for Admission/HPI (as per admission documentation):   Per Crisis worker note:  Nova Beard 10/1/24     Pt is a 53 y.o. male who was brought to the ED with Patient presents to ED due to hopelessness and suicidal ideations. Patient states that he was discharged from [facility] yesterday and felt that he was not ready but was just 'pushed out'. Patient states that he spent the entire night just wandering the streets trying to end it, and doesn't even know how he got to [place] from [place]. Patient reports significant hopelessness and feeling lonely because he doesn't have any supports or family. Patient reports confusion and gaps in time causing him to not accurately remember things. Patient states when he is angry or very upset, he \"blacks out\" and cannot recall the specifics of the event.He believes he has family that lives around and last night tried to find them, but couldn't remember where their houses were or if they still live there. Patient expressed a history of depression, but feels that last night was the worst feeling he's ever had. A friend had seen him wandering, so picked him up and suggested he come back to the hospital for help. Patient reports suicidal ideations all through the night and kept thinking of various plans to kill himself. While walking across the bridge he thought about jumping into traffic, but got high instead to distract him from those thoughts. Patient has a history of suicidal ideations and attempts via overdose and cutting. Patient denies homicidal ideations. He reports visual hallucinations of shadows, but denies any auditory hallucinations.      Patient reports multiple inpatient admissions in the past, both for " mental health and substance use. He was most recently admitted to [facility] and discharged yesterday, but couldn't remember how long he was there. He expressed feeling worthless because of the way he was discharged, with just some papers and no medications- particularly suboxone. He would like to go to rehab following admission, but realizes his lack of insurance is a barrier. Patient is not active with outpatient providers because of being uninsured. He states he has been buying suboxone off the street for an extended period of time. Patient reports history of opiate abuse, but was clean from opiates for about 7 years. Recently he has been using crack and meth. Last night he went to buy crack, but could tell immediately that it was dope, which was upsetting because he hadn't used in so long. Patient states he did not sleep at all last night, and although he ate while in the ED, he felt he had to force it. Patient continues to feel unsafe outside of the hospital and feels he needs additional support and treatment. Patient signed a 201 and prefers not to return to [facility]. Patient was informed and understood that because he is uninsured, there is a barrier in bed placement. He was informed that [facility] would not be considered tonight, but may have to reconsider tomorrow if unable to find placement elsewhere.     On admission to Inpatient Psychiatric Unit:  Patient is a 53-year-old male with past medical history of hep C, GERD, chronic hep B and OUD as well as past psychiatric history of bipolar disorder.  Patient self presented to outside hospital ED with thoughts of wanting to harm himself and signed a 201.  He was admitted to [facility] for further stabilization.     On exam, patient corroborates much of the information that was documented above by the crisis worker.  Patient notes that he feels that he was discharged prematurely from [place] and notes that he was still feeling depressed at that time.  States  "that he believes that \"I think I disrespected someone and then I was out the next day\".  He states that since being discharged [facility] and presenting to the ED, he used cocaine that was laced with fentanyl.  Patient reports being very upset that fentanyl was in his urine secondary to him being sober per his report for several years using only Suboxone as MOUD.  He denies any other drug including alcohol use other than occasional cigarettes.     Patient continues to report at this time predominant feelings of depression including decreased motivation, poor sleep and appetite as well as weight loss and passive SI, denying any intent or plan at this time, in the context of several psychosocial stressors.  Patient does not endorse any psychotic symptoms at this time although he does state that he has heard voices and seeing shadows before however and is unclear whether this was in the context of active substance use or not.      Discharge summary from [place] [facility] was reviewed.  Per documentation it appears that there is concern that patient has underlying cluster B traits and was purportedly engaging in staff splitting behavior and having disregard for unit rules.  Emphasized with the patient the importance of complying with unit policies and rules/regulations while he is here.        Past Medical History:   Diagnosis Date    Addiction to drug ([facility])     Anxiety     Bipolar I disorder, most recent episode depressed, severe with psychotic features ([facility]) 02/23/2016    Depression     Foot infection     Hallucination     Hepatitis C virus infection 02/23/2016    has had treatment    Psychiatric illness     Psychosis ([facility])     Self-injurious behavior     Substance abuse ([facility])     Suicide attempt ([facility])     Withdrawal symptoms, drug or narcotic ([facility])      No past surgical history on file.    Medications:    Current Facility-Administered Medications   Medication Dose Route Frequency Provider Last Rate    aluminum-magnesium " hydroxide-simethicone  30 mL Oral Q4H PRN Kandice Vicente MD      buprenorphine-naloxone  8 mg Sublingual After Dinner Tony Parham MD      buprenorphine-naloxone  8 mg Sublingual Daily Tony Parham MD      Cholecalciferol  2,000 Units Oral Daily Radha Solano Suhail, CRNP      clotrimazole   Topical BID Radha Solano Suhail, CRNP      cyanocobalamin  1,000 mcg Oral Daily Radha Solano Suhail, CRNP      famotidine  20 mg Oral BID PRN Radha Solano Suhail, CRNP      FLUoxetine  40 mg Oral Daily Tony Parham MD      haloperidol lactate  5 mg Intramuscular Q4H PRN Max 4/day Kandice Vicente MD      hydrOXYzine HCL  25 mg Oral Q6H PRN Max 4/day Kandice Vicente MD      hydrOXYzine HCL  50 mg Oral Q6H PRN Max 4/day Kandice Vicente MD      ibuprofen  200 mg Oral Q6H PRN Kandice Vicente MD      ibuprofen  400 mg Oral Q6H PRN Kandice Vicente MD      ibuprofen  600 mg Oral Q8H PRN Kandice Vicente MD      melatonin  3 mg Oral HS Kandice Vicente MD      methocarbamol  500 mg Oral Q8H PRN Radhawilfredo Jang, CLIF      nicotine polacrilex  4 mg Oral Q2H PRN Kandice Vicente MD      ondansetron  4 mg Oral Q8H PRN Radha Jang, CRNP      propranolol  5 mg Oral Q8H PRN Kandice Vicente MD      QUEtiapine  200 mg Oral HS Kandice Vicente MD      QUEtiapine  50 mg Oral BID Tony Parham MD      risperiDONE  0.25 mg Oral Q4H PRN Max 6/day Kandice Vicente MD      risperiDONE  0.5 mg Oral Q4H PRN Max 3/day Kandice Vicente MD      risperiDONE  1 mg Oral Q2H PRN Max 3/day Kandice Vicente MD      senna  2 tablet Oral HS Radha Cervantespaul Jang, CRNP      traZODone  50 mg Oral Q6H PRN Max 3/day Kandice Vicente MD         Allergies:     Allergies   Allergen Reactions    Acetaminophen Vomiting     Pt. States it will hurt his liver       Vital signs in last 24 hours:    Temp:  [97.1 °F (36.2 °C)-98.5 °F (36.9 °C)] 98.5 °F (36.9 °C)  HR:  [71-81] 71  BP: (117-141)/(58-81) 117/58  Resp:   "[16-18] 17  SpO2:  [97 %-99 %] 97 %  O2 Device: None (Room air)      Intake/Output Summary (Last 24 hours) at 10/7/2024 0906  Last data filed at 10/7/2024 0853  Gross per 24 hour   Intake 1400 ml   Output --   Net 1400 ml       Hospital Course:     Perez was admitted to the inpatient psychiatric unit on 10/2/2024. During their hospitalization, Perez was encouraged to attend groups and interact appropriately and positively with others in the milieu.     Perez was started on the following psychiatric medications: Seroquel and Prozac. Pt was also continued on his suboxone.These medications were titrated up to a therapeutic range as evidenced by a reduction in Perez's reported psychiatric symptomatology. There were no side effects noted or reported throughout Perez's psychiatric hospitalization.     At the time of discharge, there were no criteria present through which Perez could be kept involuntarily for further psychiatric hospitalization. Patient was able to articulate a safety plan upon discharge home, including going to any ED if their symptoms return or worsen, or calling 911. We discussed mitigating factors against suicidal behavior, and the patient was able to articulate these mitigating factors which outweighed any potential exacerbating stressors. Patient explicitly denied suicidal ideation, plan, or intent. They explicitly stated they felt safe to return to the community.     An outpatient discharge/follow up plan was discussed and coordinated between Perez, this writer, and case management team.    Specific discharge disposition: Pt to d/c to [facility] rehab for substance use treatment.    Mental Status at Time of Discharge:     Appearance: casually dressed, appears consistent with stated age  Motor: no psychomotor disturbances, no gait abnormalities  Behavior: cooperative, interacts with this writer appropriately  Speech: normal rate, rhythm, and volume  Mood: \"good\"  Affect: euthymic, normal range and " intensity  Thought Process: organized, linear, and goal-oriented  Thought Content: denies auditory or visual hallucinations  Perception: denies delusions or other perceptual disturbances  Risk Potential: denies suicidal ideation, plan, or intent. Denies homicidal ideation  Sensorium: Oriented to person, place, time, and situation  Cognition: cognitive ability appears intact but was not quantitatively tested  Consciousness: alert and awake  Attention: able to focus without difficulty  Insight: improved  Judgement: improved       Suicide/Homicide Risk Assessment:    Risk of Harm to Self:   Patient denied suicidal thoughts, intent, plan, or acts of furtherance at the time of discharge.    Risk of Harm to Others:  Patient denied homicidal thoughts or intent at the time of discharge      Admission Diagnosis:    Principal Problem:    Bipolar disorder (HCC)  Active Problems:    Hepatitis C virus infection    Gastroesophageal reflux disease    Chronic hepatitis B virus infection (HCC)    Medical clearance for psychiatric admission    Opioid use disorder in remission    Severe protein-calorie malnutrition (HCC)      Discharge Diagnosis:     Principal Problem:    Bipolar disorder (HCC)  Active Problems:    Hepatitis C virus infection    Gastroesophageal reflux disease    Chronic hepatitis B virus infection (HCC)    Medical clearance for psychiatric admission    Opioid use disorder in remission    Severe protein-calorie malnutrition (HCC)  Resolved Problems:    * No resolved hospital problems. *      Laboratory Results: I have personally reviewed all pertinent lab results.    No results found for this or any previous visit (from the past 48 hour(s)).     Discharge Medications:    See after visit summary for all reconciled discharge medications provided to patient and family.      Current Discharge Medication List        START taking these medications    Details   Cholecalciferol (VITAMIN D3) 1,000 units tablet Take 2 tablets  (2,000 Units total) by mouth daily  Qty: 60 tablet, Refills: 1    Associated Diagnoses: Bipolar disorder (HCC)      cyanocobalamin (VITAMIN B-12) 1000 MCG tablet Take 1 tablet (1,000 mcg total) by mouth daily  Qty: 30 tablet, Refills: 1    Associated Diagnoses: Bipolar disorder (HCC)      senna (SENOKOT) 8.6 mg Take 2 tablets (17.2 mg total) by mouth daily at bedtime  Qty: 60 tablet, Refills: 1    Associated Diagnoses: Bipolar disorder (HCC)              Current Discharge Medication List        STOP taking these medications       clotrimazole (LOTRIMIN) 1 % cream Comments:   Reason for Stopping:         Diclofenac Sodium (VOLTAREN) 1 % Comments:   Reason for Stopping:                Current Discharge Medication List        CONTINUE these medications which have CHANGED    Details   !! buprenorphine-naloxone (Suboxone) 8-2 mg Place 1 Film (8 mg total) under the tongue daily for 10 days  Qty: 10 Film, Refills: 0    Associated Diagnoses: Severe stimulant use disorder (HCC)      !! buprenorphine-naloxone (Suboxone) 8-2 mg Place 1 Film (8 mg total) under the tongue daily after dinner for 10 days  Qty: 10 Film, Refills: 0    Associated Diagnoses: Severe stimulant use disorder (HCC)      FLUoxetine (PROzac) 40 MG capsule Take 1 capsule (40 mg total) by mouth daily  Qty: 30 capsule, Refills: 1    Associated Diagnoses: Bipolar disorder (HCC); Mood disorder (HCC)      !! QUEtiapine (SEROquel) 200 mg tablet Take 1 tablet (200 mg total) by mouth daily at bedtime  Qty: 30 tablet, Refills: 1    Associated Diagnoses: Bipolar disorder (HCC)      !! QUEtiapine (SEROquel) 50 mg tablet Take 1 tablet (50 mg total) by mouth 2 (two) times a day  Qty: 60 tablet, Refills: 1    Associated Diagnoses: Bipolar disorder (HCC)       !! - Potential duplicate medications found. Please discuss with provider.           Current Discharge Medication List        CONTINUE these medications which have NOT CHANGED    Details   famotidine (PEPCID) 20 mg  tablet Take 1 tablet (20 mg total) by mouth 2 (two) times a day  Qty: 60 tablet, Refills: 0    Associated Diagnoses: Gastroesophageal reflux disease              Discharge instructions/Information to patient and family:     See after visit summary for information provided to patient and family.      Provisions for Follow-Up Care:    See after visit summary for information related to follow-up care and any pertinent home health orders.      Discharge Statement:    I spent 30 minutes discharging the patient. This time was spent on the day of discharge. I had direct contact with the patient on the day of discharge.     Additional documentation is required if more than 30 minutes were spent on discharge:    I had face-to-face contact with the patient and discussed discharge treatment plan  I reviewed the importance of compliance with medications and outpatient treatment after discharge with the patient.  I discussed discharge and treatment plan with the patient, nursing, and case management/social work.  I discussed the medication regimen and possible side effects of the medications with the patient prior to discharge. At the time of discharge they were tolerating psychiatric medications.  I discussed outpatient follow up with the patient as per treatment plan / aftercare summary.  I reviewed elements of the aftercare plan with the patient. I discussed that if symptoms worsen or reoccur, that the patient can return to the emergency room or dial 911 in an emergency.  I reviewed pertinent mitigating vs exacerbating stressors, as well as other risk factors, as they relate to potential suicidal behavior.  I reviewed the patient's hospital course and current psychiatric disease status. As of today, they are now at goal. They are psychiatrically stable for discharge home. The combination of active psychopharmacological medication management, interdisciplinary coordination with case management, and adjunctive milieu and group  therapy to augment psychopharmacological efficacy appears to have been successful. The patient's risk of morbidity, and progression or decompensation of psychiatric disease, is lower today as compared to the day of admission.      Tony Parham MD 10/07/24

## 2024-10-07 NOTE — BH TRANSITION RECORD
Contact Information: If you have any questions, concerns, pended studies, tests and/or procedures, or emergencies regarding your inpatient behavioral health visit. Please contact [facility] [phone] and ask to speak to a , nurse or physician. A contact is available 24 hours/ 7 days a week at this number.     Summary of Procedures Performed During your Stay:  Below is a list of major procedures performed during your hospital stay and a summary of results:  - Cardiac Procedures/Studies: ECG- Normal Sinus Rhythm .    Pending Studies (From admission, onward)      None          Please follow up on the above pending studies with your PCP and/or referring provider.

## 2024-10-07 NOTE — PROGRESS NOTES
10/07/24 1000   Discharge Planning   Living Arrangements Other (Comment)  (going to an IP rehab)   Support Systems Self;Other (Comment)  ([facility] staff)   Type of Current Residence Homeless;Other (Comment)  (going to IP rehab)   Current Home Care Services No   Other Referral/Resources/Interventions Provided:   Referrals Provided: Crisis Hotline;Other (Specify)  (HOST)   Post Acute Placement to: Substance Abuse Treatment  ([facility])   Discharge Communications   Discharge planning discussed with: Patient and [facility]   Freedom of Choice Yes   CM contacted family/caregiver? No- see comments  (patient does not have any family members/caregivers)   Contacts   Patient Contacts n/a   Relationship to Patient: Treatment Provider   Contact Method Phone   Phone Number [phone]  (HOST)   Reason/Outcome Referral   [facility]   Would you like to participate in our [facility] service program?   No - Declined

## 2024-10-07 NOTE — DISCHARGE INSTR - OTHER ORDERS
You are being discharged to [facility] location at [address], [place] [address].     Triggers you have identified during your hospitalization that led to your admission includes suicidal ideation, and a distressed mood related to multiple psychosocial stressors. Coping skills you have identified during your hospitalization includes resting and listening to music. If you are unable to deal with your distressed mood alone please contact a staff member at the rehab facility. If that is not effective and you continue to have suicidal ideation, a distressed mood, are feeling overwhelmed, and/or in crisis please contact [facility] at [phone], dial [phone] or go to the nearest emergency center.     [facility] Warm Line: [phone] available 7 days a week from 7 am to 11 pm.    A warm line is a phone service for people who are looking for support, engagement, and hope during non-emergency mental health struggles or distress. A warm line is staffed by peers who have personal or lived experience with mental health disorders and who can listen, share, and offer a sense of recovery. A warm line is different from a crisis line or hotline, which are intended for emergency situations    National Suicide Hotline: 366.612.4940    Crisis Text Line: 872159      HOW TO GET SUBSTANCE ABUSE HELP:  If you or someone you know has a drug or alcohol problem, there is help:  [facility]: [phone]  [facility]: [phone]  An assessment is the first step.     In addition to those listed there are other programs available in the area but assessment is best to determine an appropriate level of care.    If you DO NOT have Medical Assistance (MA) or Private Insurance, an assessment can be scheduled at one of these providers:    [facility]  [address] [id number] [phone]   [address]  [facility] [place] [address]  [phone]   [facility]  [address] [facility] [place] [address]  [phone]   [facility]  [address] [place] [address]  [phone]   [facility]  [address] [place] [address]  [phone]   [facility]  [address] [facility] [place] [address]  [phone]   [facility]  [address], [address], [place] [address]  [phone]       Sophia, or Susie, our Behavioral Health Nurse Navigators, will be calling you after your discharge, on the phone number that you provided.  They will be available as an additional support, if needed.     If you wish to speak with one of them, you may contact Sophia at [phone] or Susie at [phone].

## 2024-10-07 NOTE — CASE MANAGEMENT
Case Management Discharge Planning Note    Patient name Perez Stack Jr.  Location [facility] 6[facility] 649-01 MRN [id number]  : 1970 Date 10/7/2024       Current Admission Date: 10/2/2024  Current Admission Diagnosis:Bipolar disorder (HCC)   Patient Active Problem List    Diagnosis Date Noted Date Diagnosed    Severe protein-calorie malnutrition (HCC) 2024     Elevated LFTs 2024     Toe pain 2024     Low TSH level 2024     Dysphagia 2023     Opioid use disorder in remission 04/10/2023     Severe stimulant use disorder (HCC) 2023     Medical clearance for psychiatric admission 2023     Bipolar disorder (HCC) 2016     Hepatitis C virus infection 2016     Gastroesophageal reflux disease 2016     Chronic hepatitis B virus infection (HCC) 2016     Cellulitis and abscess of toe of left foot 2016       LOS (days): 5  Geometric Mean LOS (GMLOS) (days):   Days to GMLOS:     OBJECTIVE:  Risk of Unplanned Readmission Score: 13.9         Current admission status: Inpatient Psych   Preferred Pharmacy:   [facility] [place] [id number]  Phone: [phone] Fax: [phone]    Primary Care Provider: No primary care provider on file.    Primary Insurance: [employer] PENDING  Secondary Insurance:     DISCHARGE DETAILS:    Discharge planning discussed with:: Patient and MARS  Freedom of Choice: Yes     CM contacted family/caregiver?: No- see comments (patient does not have any family members/caregivers)             Contacts  Patient Contacts: n/a  Relationship to Patient:: Treatment Provider  Contact Method: Phone  Phone Number: [phone] (HOST)  Reason/Outcome: Referral              Other Referral/Resources/Interventions Provided:  Referrals Provided:: Crisis Hotline, Other (Specify) (HOST)  Post Acute Placement to:: Substance Abuse Treatment ([facility] [place])    Would you  like to participate in our [facility] service program?  : No - Declined         Perez will be going to [facility] rehab, the [place] location. He does not have insurance so when he is done with rehab he was told to contact [facility] to make an appointment with them and to complete the PAMA paperwork that is required to complete the rest of his MA application. Contacted Montserrat Woodard,  with [facility], she advised that they received a referral for him for MH funding on Monday when he discharged from [facility] and were not able to reach him, to let her know he is going to rehab, she will be closing the referral at this time.

## 2024-10-07 NOTE — CASE MANAGEMENT
Patient has been accepted at [facility], they will be here to pick him up at 1 pm today. He needs his prescription sent to [facility] [place], [place] - [address].

## 2024-10-07 NOTE — PROGRESS NOTES
10/07/24 0811   Team Meeting   Meeting Type Daily Rounds   Initial Conference Date 10/07/24   Next Conference Date 10/08/24   Team Members Present   Team Members Present Physician;Nurse;;   Physician Team Member Dr Cardona, BRUNO Richter   Nursing Team Member Barbara   Care Management Team Member Jesusita   Social Work Team Member Chanel   Patient/Family Present   Patient Present No   Patient's Family Present No     Discharge today to [facility] at 1 pm, reports anx/dep.